# Patient Record
Sex: FEMALE | Race: WHITE | NOT HISPANIC OR LATINO | URBAN - METROPOLITAN AREA
[De-identification: names, ages, dates, MRNs, and addresses within clinical notes are randomized per-mention and may not be internally consistent; named-entity substitution may affect disease eponyms.]

---

## 2022-08-15 ENCOUNTER — OFFICE VISIT (OUTPATIENT)
Dept: OBGYN CLINIC | Facility: CLINIC | Age: 52
End: 2022-08-15
Payer: OTHER GOVERNMENT

## 2022-08-15 VITALS
BODY MASS INDEX: 24.14 KG/M2 | HEIGHT: 67 IN | WEIGHT: 153.8 LBS | SYSTOLIC BLOOD PRESSURE: 120 MMHG | DIASTOLIC BLOOD PRESSURE: 64 MMHG

## 2022-08-15 DIAGNOSIS — Z12.31 ENCOUNTER FOR SCREENING MAMMOGRAM FOR MALIGNANT NEOPLASM OF BREAST: ICD-10-CM

## 2022-08-15 DIAGNOSIS — Z12.4 SCREENING FOR CERVICAL CANCER: ICD-10-CM

## 2022-08-15 DIAGNOSIS — Z01.419 ENCOUNTER FOR GYNECOLOGICAL EXAMINATION WITHOUT ABNORMAL FINDING: Primary | ICD-10-CM

## 2022-08-15 PROCEDURE — 99386 PREV VISIT NEW AGE 40-64: CPT | Performed by: OBSTETRICS & GYNECOLOGY

## 2022-08-15 NOTE — PROGRESS NOTES
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  - Contraceptive counseling discussed  Current contraception: condoms or Mirena IUD since 04/2018:   - Breast Cancer Screening: Last Mammogram Not on file,  Order given   - Colorectal cancer screening was not ordered  Pt has referral   - The following were reviewed in today's visit: breast self exam, menopause, osteoporosis, adequate intake of calcium and vitamin D, exercise, healthy diet and colonoscopy discussed and ordered    Additional problems addressed during this visit:  1  Encounter for gynecological examination without abnormal finding    2  Encounter for screening mammogram for malignant neoplasm of breast  -     Mammo screening bilateral w 3d & cad; Future; Expected date: 08/15/2023    3  Screening for cervical cancer  -     Thinprep Tis Pap and HPV mRNA E6/E7 Reflex HPV 16,18/45        CC:  Annual Gynecologic Examination    HPI: Yannick Rosa is a 45 y o  No obstetric history on file  who presents for annual gynecologic examination  46 yo here for wellness exam     Mirena iud since 4/2018   No bleeding  + night awakening   No dryness   Mirena placed  4/2018  Uses cytotec for placement   The following portions of the patient's history were reviewed and updated as appropriate: She  has a past medical history of Abnormal Pap smear of cervix and Varicella  She  has no past surgical history on file    Her family history includes Breast cancer in her maternal grandmother; Cancer in her maternal grandfather; Diabetes in her maternal grandmother and mother; No Known Problems in her brother and paternal grandmother; Stroke in her maternal grandmother  She  reports that she quit smoking about 18 years ago  She has never used smokeless tobacco  She reports current alcohol use  She reports that she does not use drugs  Current Outpatient Medications   Medication Sig Dispense Refill    Levonorgestrel (MIRENA) 20 MCG/DAY IUD 1 each by Intrauterine route once       No current facility-administered medications for this visit  She has No Known Allergies       Review of Systems:  All systems normal except as noted in HPI          Objective:  /64 (BP Location: Left arm, Patient Position: Sitting, Cuff Size: Large)   Ht 5' 7 25" (1 708 m)   Wt 69 8 kg (153 lb 12 8 oz)   Breastfeeding No   BMI 23 91 kg/m²    Physical Exam  Vitals and nursing note reviewed  Constitutional:       Appearance: Normal appearance  HENT:      Head: Normocephalic  Cardiovascular:      Rate and Rhythm: Normal rate and regular rhythm  Pulses: Normal pulses  Heart sounds: Normal heart sounds  Pulmonary:      Effort: Pulmonary effort is normal       Breath sounds: Normal breath sounds  Chest:      Chest wall: No mass, lacerations, swelling, tenderness or edema  Breasts: Liborio Score is 4  Breasts are symmetrical       Right: Normal  No swelling, bleeding, inverted nipple, mass, nipple discharge, skin change, tenderness, axillary adenopathy or supraclavicular adenopathy  Left: No swelling, bleeding, inverted nipple, mass, nipple discharge, skin change, tenderness, axillary adenopathy or supraclavicular adenopathy  Abdominal:      General: Abdomen is flat  Bowel sounds are normal       Palpations: Abdomen is soft  Genitourinary:     General: Normal vulva  Exam position: Lithotomy position  Pubic Area: No rash  Liborio stage (genital): 4       Labia:         Right: No rash, tenderness or lesion           Left: No rash, tenderness or lesion  Urethra: No urethral pain, urethral swelling or urethral lesion  Vagina: Normal       Cervix: No cervical motion tenderness or discharge  Uterus: Normal        Adnexa: Right adnexa normal and left adnexa normal       Rectum: Normal    Musculoskeletal:         General: Normal range of motion  Cervical back: Neck supple  Lymphadenopathy:      Upper Body:      Right upper body: No supraclavicular, axillary or pectoral adenopathy  Left upper body: No supraclavicular, axillary or pectoral adenopathy  Lower Body: No right inguinal adenopathy  No left inguinal adenopathy  Skin:     General: Skin is warm and dry  Neurological:      General: No focal deficit present  Mental Status: She is alert and oriented to person, place, and time     Psychiatric:         Mood and Affect: Mood normal          Behavior: Behavior normal

## 2022-08-15 NOTE — PATIENT INSTRUCTIONS
Calcium 1200-1500mg + 600-1000 IU Vit D daily  Exercise 150 minutes per week minimum including weight bearing exercises  Pap with high risk HPV Q 3-5 years  Annual mammogram and monthly breast self exam recommended  Colonoscopy-  encouraged     has order     Kegels 20 times twice daily  Silicone based lubricant with sex  Vaginal moisturizers twice weekly as needed

## 2022-08-17 LAB
CLINICAL INFO: NORMAL
CYTO CVX: NORMAL
CYTOLOGY CMNT CVX/VAG CYTO-IMP: NORMAL
DATE PREVIOUS BX: NORMAL
HPV E6+E7 MRNA CVX QL NAA+PROBE: NOT DETECTED
LMP START DATE: NORMAL
SL AMB PREV. PAP:: NORMAL
SPECIMEN SOURCE CVX/VAG CYTO: NORMAL

## 2022-10-24 ENCOUNTER — HOSPITAL ENCOUNTER (OUTPATIENT)
Dept: MAMMOGRAPHY | Facility: IMAGING CENTER | Age: 52
Discharge: HOME | End: 2022-10-24
Payer: COMMERCIAL

## 2022-10-24 VITALS — WEIGHT: 152 LBS | HEIGHT: 67 IN | BODY MASS INDEX: 23.86 KG/M2

## 2022-10-24 DIAGNOSIS — Z12.31 ENCOUNTER FOR SCREENING MAMMOGRAM FOR MALIGNANT NEOPLASM OF BREAST: ICD-10-CM

## 2022-10-24 PROCEDURE — 77063 BREAST TOMOSYNTHESIS BI: CPT

## 2022-10-24 PROCEDURE — 77067 SCR MAMMO BI INCL CAD: CPT

## 2023-03-24 ENCOUNTER — TELEPHONE (OUTPATIENT)
Dept: GASTROENTEROLOGY | Facility: CLINIC | Age: 53
End: 2023-03-24

## 2023-03-24 ENCOUNTER — PREP FOR PROCEDURE (OUTPATIENT)
Dept: GASTROENTEROLOGY | Facility: CLINIC | Age: 53
End: 2023-03-24

## 2023-03-24 DIAGNOSIS — Z12.11 SCREENING FOR COLON CANCER: Primary | ICD-10-CM

## 2023-03-24 NOTE — TELEPHONE ENCOUNTER
Scheduled date of colonoscopy (as of today):5/23/23  Physician performing colonoscopy:JOYCE  Location of colonoscopy:[facility]  Clearances: NA

## 2023-03-24 NOTE — TELEPHONE ENCOUNTER
03/24/23  Screened by: Robbin Falk    Referring Provider Philomena Holley    Pre- Screening: There is no height or weight on file to calculate BMI  Has patient been referred for a routine screening Colonoscopy? yes  Is the patient between 39-70 years old? yes      Previous Colonoscopy no   If yes:    Date:     Facility:     Reason:       SCHEDULING STAFF: If the patient is between 45yrs-49yrs, please advise patient to confirm benefits/coverage with their insurance company for a routine screening colonoscopy, some insurance carriers will only cover at [address] or older  If the patient is over 66years old, please schedule an office visit  Does the patient want to see a Gastroenterologist prior to their procedure OR are they having any GI symptoms? no    Has the patient been hospitalized or had abdominal surgery in the past 6 months? no    Does the patient use supplemental oxygen? no    Does the patient take Coumadin, Lovenox, Plavix, Elliquis, Xarelto, or other blood thinning medication? no    Has the patient had a stroke, cardiac event, or stent placed in the past year? no     PT PASSED OA    SCHEDULING STAFF: If patient answers NO to above questions, then schedule procedure  If patient answers YES to above questions, then schedule office appointment  If patient is between 45yrs - 49yrs, please advise patient that we will have to confirm benefits & coverage with their insurance company for a routine screening colonoscopy

## 2023-05-09 ENCOUNTER — TELEPHONE (OUTPATIENT)
Dept: GASTROENTEROLOGY | Facility: CLINIC | Age: 53
End: 2023-05-09

## 2023-05-09 DIAGNOSIS — Z12.11 SCREENING FOR COLON CANCER: Primary | ICD-10-CM

## 2023-05-09 NOTE — TELEPHONE ENCOUNTER
Gave patient positive BV results. She started the metronidazole and symptoms are improving. She states that she typically gets yeast infections after taking an antibiotic and was wondering if she could get something for that. Will prescribe diflucan for patient. She had no further questions or concerns.    Procedure confirmed  Colonoscopy     Via: Spoke with patient  Instructions given: Email     Prep Given: Golytely    Call the office if there are any questions

## 2023-05-22 ENCOUNTER — ANESTHESIA EVENT (OUTPATIENT)
Dept: ANESTHESIOLOGY | Facility: AMBULATORY SURGERY CENTER | Age: 53
End: 2023-05-22

## 2023-05-22 ENCOUNTER — ANESTHESIA (OUTPATIENT)
Dept: ANESTHESIOLOGY | Facility: AMBULATORY SURGERY CENTER | Age: 53
End: 2023-05-22

## 2023-05-23 ENCOUNTER — ANESTHESIA (OUTPATIENT)
Dept: GASTROENTEROLOGY | Facility: AMBULATORY SURGERY CENTER | Age: 53
End: 2023-05-23

## 2023-05-23 ENCOUNTER — HOSPITAL ENCOUNTER (OUTPATIENT)
Dept: GASTROENTEROLOGY | Facility: AMBULATORY SURGERY CENTER | Age: 53
Discharge: HOME | End: 2023-05-23

## 2023-05-23 ENCOUNTER — ANESTHESIA EVENT (OUTPATIENT)
Dept: GASTROENTEROLOGY | Facility: AMBULATORY SURGERY CENTER | Age: 53
End: 2023-05-23

## 2023-05-23 VITALS
DIASTOLIC BLOOD PRESSURE: 93 MMHG | HEIGHT: 67 IN | RESPIRATION RATE: 13 BRPM | BODY MASS INDEX: 24.33 KG/M2 | OXYGEN SATURATION: 100 % | SYSTOLIC BLOOD PRESSURE: 157 MMHG | HEART RATE: 58 BPM | WEIGHT: 155 LBS | TEMPERATURE: 98.6 F

## 2023-05-23 DIAGNOSIS — Z12.11 SCREENING FOR COLON CANCER: ICD-10-CM

## 2023-05-23 LAB
EXT PREGNANCY TEST URINE: NEGATIVE
EXT. CONTROL: NORMAL

## 2023-05-23 RX ORDER — PROPOFOL 10 MG/ML
INJECTION, EMULSION INTRAVENOUS AS NEEDED
Status: DISCONTINUED | OUTPATIENT
Start: 2023-05-23 | End: 2023-05-23

## 2023-05-23 RX ORDER — LIDOCAINE HYDROCHLORIDE 10 MG/ML
INJECTION, SOLUTION EPIDURAL; INFILTRATION; INTRACAUDAL; PERINEURAL AS NEEDED
Status: DISCONTINUED | OUTPATIENT
Start: 2023-05-23 | End: 2023-05-23

## 2023-05-23 RX ORDER — SODIUM CHLORIDE, SODIUM LACTATE, POTASSIUM CHLORIDE, CALCIUM CHLORIDE 600; 310; 30; 20 MG/100ML; MG/100ML; MG/100ML; MG/100ML
50 INJECTION, SOLUTION INTRAVENOUS CONTINUOUS
Status: DISCONTINUED | OUTPATIENT
Start: 2023-05-23 | End: 2023-05-27 | Stop reason: HOSPADM

## 2023-05-23 RX ADMIN — PROPOFOL 40 MG: 10 INJECTION, EMULSION INTRAVENOUS at 08:36

## 2023-05-23 RX ADMIN — PROPOFOL 20 MG: 10 INJECTION, EMULSION INTRAVENOUS at 07:31

## 2023-05-23 RX ADMIN — PROPOFOL 20 MG: 10 INJECTION, EMULSION INTRAVENOUS at 08:24

## 2023-05-23 RX ADMIN — PROPOFOL 30 MG: 10 INJECTION, EMULSION INTRAVENOUS at 07:51

## 2023-05-23 RX ADMIN — SODIUM CHLORIDE, SODIUM LACTATE, POTASSIUM CHLORIDE, CALCIUM CHLORIDE 50 ML/HR: 600; 310; 30; 20 INJECTION, SOLUTION INTRAVENOUS at 07:05

## 2023-05-23 RX ADMIN — PROPOFOL 20 MG: 10 INJECTION, EMULSION INTRAVENOUS at 07:33

## 2023-05-23 RX ADMIN — PROPOFOL 30 MG: 10 INJECTION, EMULSION INTRAVENOUS at 08:04

## 2023-05-23 RX ADMIN — PROPOFOL 20 MG: 10 INJECTION, EMULSION INTRAVENOUS at 08:33

## 2023-05-23 RX ADMIN — PROPOFOL 50 MG: 10 INJECTION, EMULSION INTRAVENOUS at 08:29

## 2023-05-23 RX ADMIN — PROPOFOL 110 MG: 10 INJECTION, EMULSION INTRAVENOUS at 07:26

## 2023-05-23 RX ADMIN — PROPOFOL 20 MG: 10 INJECTION, EMULSION INTRAVENOUS at 07:54

## 2023-05-23 RX ADMIN — PROPOFOL 20 MG: 10 INJECTION, EMULSION INTRAVENOUS at 07:56

## 2023-05-23 RX ADMIN — PROPOFOL 30 MG: 10 INJECTION, EMULSION INTRAVENOUS at 08:21

## 2023-05-23 RX ADMIN — LIDOCAINE HYDROCHLORIDE 50 MG: 10 INJECTION, SOLUTION EPIDURAL; INFILTRATION; INTRACAUDAL; PERINEURAL at 07:26

## 2023-05-23 RX ADMIN — PROPOFOL 20 MG: 10 INJECTION, EMULSION INTRAVENOUS at 07:39

## 2023-05-23 RX ADMIN — PROPOFOL 20 MG: 10 INJECTION, EMULSION INTRAVENOUS at 08:26

## 2023-05-23 RX ADMIN — PROPOFOL 40 MG: 10 INJECTION, EMULSION INTRAVENOUS at 07:45

## 2023-05-23 RX ADMIN — PROPOFOL 40 MG: 10 INJECTION, EMULSION INTRAVENOUS at 07:42

## 2023-05-23 RX ADMIN — PROPOFOL 20 MG: 10 INJECTION, EMULSION INTRAVENOUS at 08:31

## 2023-05-23 RX ADMIN — PROPOFOL 30 MG: 10 INJECTION, EMULSION INTRAVENOUS at 08:16

## 2023-05-23 RX ADMIN — PROPOFOL 30 MG: 10 INJECTION, EMULSION INTRAVENOUS at 07:36

## 2023-05-23 RX ADMIN — PROPOFOL 20 MG: 10 INJECTION, EMULSION INTRAVENOUS at 07:47

## 2023-05-23 RX ADMIN — PROPOFOL 20 MG: 10 INJECTION, EMULSION INTRAVENOUS at 08:12

## 2023-05-23 RX ADMIN — PROPOFOL 30 MG: 10 INJECTION, EMULSION INTRAVENOUS at 08:09

## 2023-05-23 RX ADMIN — PROPOFOL 30 MG: 10 INJECTION, EMULSION INTRAVENOUS at 07:28

## 2023-05-23 RX ADMIN — PROPOFOL 40 MG: 10 INJECTION, EMULSION INTRAVENOUS at 07:29

## 2023-05-23 RX ADMIN — PROPOFOL 20 MG: 10 INJECTION, EMULSION INTRAVENOUS at 08:18

## 2023-05-23 RX ADMIN — PROPOFOL 30 MG: 10 INJECTION, EMULSION INTRAVENOUS at 08:00

## 2023-05-23 NOTE — H&P
"History and Physical -  [facility]  Dexter De La Torre 47 y o  female MRN: [id number]    HPI: Dexter De La Torre is a 47y o  year old female who presents for colon cancer screening    REVIEW OF SYSTEMS: Per the HPI, and otherwise unremarkable      Historical Information   Past Medical History:   Diagnosis Date   • Abnormal Pap smear of cervix    • Sinus congestion     chronic   • Varicella      Past Surgical History:   Procedure Laterality Date   • BREAST EXCISIONAL BIOPSY Right 2008    benign   • US BREAST NEEDLE LOC RIGHT Right 10/08/2008     Social History   Social History     Substance and Sexual Activity   Alcohol Use Yes     Social History     Substance and Sexual Activity   Drug Use Never     Social History     Tobacco Use   Smoking Status Former   • Types: Cigarettes   • Quit date:    • Years since quittin 4   Smokeless Tobacco Never     Family History   Problem Relation Age of Onset   • Diabetes Mother    • No Known Problems Father    • Breast cancer Maternal Grandmother         50-60's   • Stroke Maternal Grandmother    • Diabetes Maternal Grandmother    • Cancer Maternal Grandfather    • No Known Problems Paternal Grandmother    • No Known Problems Brother    • No Known Problems Half-Sister    • Breast cancer Maternal Aunt         68-70   • Endometrial cancer Maternal Aunt         maybe 40's   • No Known Problems Maternal Aunt    • Colon cancer Neg Hx    • Ovarian cancer Neg Hx        Meds/Allergies       Current Outpatient Medications:   •  Levonorgestrel (MIRENA) 20 MCG/DAY IUD  •  polyethylene glycol (GOLYTELY) 4000 mL solution    Current Facility-Administered Medications:   •  lactated ringers infusion, 50 mL/hr, Intravenous, Continuous, 50 mL/hr at 23 0705    No Known Allergies    Objective     /84   Pulse 62   Temp 98 6 °F (37 °C) (Tympanic)   Resp 16   Ht 5' 7\" (1 702 m)   Wt 70 3 kg (155 lb)   SpO2 97%   BMI 24 28 kg/m²     PHYSICAL EXAM    Gen: NAD " AAOx3  Head: Normocephalic, Atraumatic  CV: S1S2 RRR no m/r/g  CHEST: Clear b/l no c/r/w  ABD: soft, +BS NT/ND  EXT: no edema    ASSESSMENT/PLAN:  This is a 47y o  year old female here for colonoscopy, and she is stable and optimized for her procedure

## 2023-05-23 NOTE — ANESTHESIA POSTPROCEDURE EVALUATION
Post-Op Assessment Note    CV Status:  Stable  Pain Score: 0    Pain management: adequate     Mental Status:  Alert and awake   Hydration Status:  Euvolemic   PONV Controlled:  Controlled   Airway Patency:  Patent      Post Op Vitals Reviewed: Yes      Staff: Anesthesiologist, CRNA         No notable events documented      BP   134/87   Temp   n/a   Pulse  74   Resp   14   SpO2   98

## 2023-05-23 NOTE — ANESTHESIA PREPROCEDURE EVALUATION
Procedure:  COLONOSCOPY    Relevant Problems   No relevant active problems        Physical Exam    Airway    Mallampati score: II  TM Distance: >3 FB  Neck ROM: full     Dental   No notable dental hx     Cardiovascular      Pulmonary      Other Findings        Anesthesia Plan  ASA Score- 1     Anesthesia Type- IV sedation with anesthesia with ASA Monitors  Additional Monitors:   Airway Plan:           Plan Factors-Exercise tolerance (METS): >4 METS  Chart reviewed  Patient is not a current smoker  Induction- intravenous  Postoperative Plan-     Informed Consent- Anesthetic plan and risks discussed with patient  I personally reviewed this patient with the CRNA  Discussed and agreed on the Anesthesia Plan with the CRNA  Torie Collins

## 2023-06-02 ENCOUNTER — TELEPHONE (OUTPATIENT)
Dept: GASTROENTEROLOGY | Facility: CLINIC | Age: 53
End: 2023-06-02

## 2023-06-02 DIAGNOSIS — C18.9 ADENOCARCINOMA OF COLON (HCC): Primary | ICD-10-CM

## 2023-06-02 DIAGNOSIS — C18.9 COLON ADENOCARCINOMA (HCC): Primary | ICD-10-CM

## 2023-06-02 PROCEDURE — 88344 IMHCHEM/IMCYTCHM EA MLT ANTB: CPT | Performed by: PATHOLOGY

## 2023-06-02 PROCEDURE — 88305 TISSUE EXAM BY PATHOLOGIST: CPT | Performed by: PATHOLOGY

## 2023-06-02 NOTE — TELEPHONE ENCOUNTER
----- Message from Yanira Be MD sent at 6/2/2023  3:44 PM EDT -----  I'll take care of it! Just place the referral to me and I'll do the rest  Thanks Jerome Felix, lets get CT CAP ASAP and before she comes in please - we'll see how that looks and talk with her and send to med onc if needed -    ANANT    ----- Message -----  From: Essence Rocha DO  Sent: 6/2/2023   2:00 PM EDT  To: MD Ambar Bullock -   I saw this patient for a screening colonoscopy last week  She had a very large pedunculated polyp in the sigmoid at 28 cm  I had to remove much of the lesion before I could access the stalk  I tattooed the polypectomy site  Histology returned as invasive adenocarcinoma  Should I sent this pt to you or medical oncology? If you see her, would you like any lab/imaging before hand?   Thanks - Genella Kussmaul
Discussed with surgical oncologist   Consult entered    Plan CT and oncology consult    Discussed with patient
English

## 2023-06-02 NOTE — RESULT ENCOUNTER NOTE
Discussed with patient, 1 year colonoscopy recall  Message sent to oncology about imaging and follow-up

## 2023-06-05 ENCOUNTER — TELEPHONE (OUTPATIENT)
Dept: HEMATOLOGY ONCOLOGY | Facility: CLINIC | Age: 53
End: 2023-06-05

## 2023-06-05 ENCOUNTER — PATIENT OUTREACH (OUTPATIENT)
Dept: HEMATOLOGY ONCOLOGY | Facility: CLINIC | Age: 53
End: 2023-06-05

## 2023-06-05 DIAGNOSIS — C18.9 COLON ADENOCARCINOMA (HCC): Primary | ICD-10-CM

## 2023-06-05 NOTE — PROGRESS NOTES
NN phone outreach to the pt, called and explained my role, I rvwd w her the appt she had made today w surg onc and how I wanted to get her sched sooner w Dr Andria Fleischer following her CT scan  I explained results would be in for her CT scan by Tues the following wk  She was very appreciative that we resched her sooner to be seen  I explained that she would be meeting Dr Andria Fleischer to discuss if sx is rec or not for the part of the colon they found the polyp w the cancerous cells present  I explained the reason for the CT scan was to make sure there were no masses noted anywhere else such the lungs or liver  She understood  I told her I would be f/u after she meets w Dr Andria Fleischer to see what the plan is for her in case I can assist in anyway, I told her she could save my phone number and if she has any questions that turn up she can always reach out to me  She was very appreciative of my call and the information explained

## 2023-06-05 NOTE — TELEPHONE ENCOUNTER
I called Isabela Mendez in response to a referral that was received for patient to establish care with Surgical Oncology  Outreach was made to schedule a consultation     I left a voicemail explaining the reason for my call and advised patient to call [facility] [place] at [phone]  Another attempt will be made to contact patient  Patient to be scheduled after CT on 6/11

## 2023-06-06 ENCOUNTER — APPOINTMENT (OUTPATIENT)
Dept: LAB | Facility: HOSPITAL | Age: 53
End: 2023-06-06
Payer: COMMERCIAL

## 2023-06-06 DIAGNOSIS — C18.9: ICD-10-CM

## 2023-06-06 LAB
BUN SERPL-MCNC: 18 MG/DL (ref 5–25)
CREAT SERPL-MCNC: 1.03 MG/DL (ref 0.6–1.3)
GFR SERPL CREATININE-BSD FRML MDRD: 62 ML/MIN/1.73SQ M

## 2023-06-06 PROCEDURE — 84520 ASSAY OF UREA NITROGEN: CPT

## 2023-06-06 PROCEDURE — 36415 COLL VENOUS BLD VENIPUNCTURE: CPT

## 2023-06-06 PROCEDURE — 82565 ASSAY OF CREATININE: CPT

## 2023-06-08 PROBLEM — C18.9 COLON ADENOCARCINOMA (HCC): Status: ACTIVE | Noted: 2023-06-08

## 2023-06-11 ENCOUNTER — HOSPITAL ENCOUNTER (OUTPATIENT)
Dept: CT IMAGING | Facility: HOSPITAL | Age: 53
Discharge: HOME | End: 2023-06-11
Attending: STUDENT IN AN ORGANIZED HEALTH CARE EDUCATION/TRAINING PROGRAM
Payer: COMMERCIAL

## 2023-06-11 DIAGNOSIS — C18.9 COLON ADENOCARCINOMA (HCC): ICD-10-CM

## 2023-06-11 PROCEDURE — 71260 CT THORAX DX C+: CPT

## 2023-06-11 PROCEDURE — 74177 CT ABD & PELVIS W/CONTRAST: CPT

## 2023-06-11 PROCEDURE — G1004 CDSM NDSC: HCPCS

## 2023-06-11 RX ADMIN — IOHEXOL 80 ML: 350 INJECTION, SOLUTION INTRAVENOUS at 10:52

## 2023-06-13 ENCOUNTER — CONSULT (OUTPATIENT)
Dept: SURGICAL ONCOLOGY | Facility: CLINIC | Age: 53
End: 2023-06-13
Payer: COMMERCIAL

## 2023-06-13 VITALS
OXYGEN SATURATION: 98 % | WEIGHT: 160 LBS | BODY MASS INDEX: 25.11 KG/M2 | RESPIRATION RATE: 16 BRPM | DIASTOLIC BLOOD PRESSURE: 70 MMHG | TEMPERATURE: 98.1 F | HEIGHT: 67 IN | SYSTOLIC BLOOD PRESSURE: 120 MMHG | HEART RATE: 65 BPM

## 2023-06-13 DIAGNOSIS — C18.9 COLON ADENOCARCINOMA (HCC): Primary | ICD-10-CM

## 2023-06-13 DIAGNOSIS — C18.9 ADENOCARCINOMA OF COLON (HCC): ICD-10-CM

## 2023-06-13 PROCEDURE — 99205 OFFICE O/P NEW HI 60 MIN: CPT | Performed by: STUDENT IN AN ORGANIZED HEALTH CARE EDUCATION/TRAINING PROGRAM

## 2023-06-13 RX ORDER — METRONIDAZOLE 500 MG/100ML
500 INJECTION, SOLUTION INTRAVENOUS ONCE
Status: CANCELLED | OUTPATIENT
Start: 2023-06-13

## 2023-06-13 RX ORDER — CEFAZOLIN SODIUM 2 G/50ML
2000 SOLUTION INTRAVENOUS ONCE
Status: CANCELLED | OUTPATIENT
Start: 2023-06-13

## 2023-06-13 NOTE — PROGRESS NOTES
Surgical Oncology Consultation    [address] [facility] CANCER CARE SURGICAL ONCOLOGY [facility]  [address] [facility] [address] [phone]    Patient:  Irl File  1970  [id number]    Primary Care provider:  Lupillo Osborne DO  [address]  [place] [address]    Referring provider:  Seamus Posada DO  [address] [facility]  [address]  [facility],  [address] [place] [address] Ext    Diagnoses and all orders for this visit:    Colon adenocarcinoma [facility])  -     Case request operating room: SIGMOID RESECTION COLON LAPAROSCOPIC W ROBOTICS; Standing  -     Case request operating room: SIGMOID RESECTION COLON LAPAROSCOPIC W ROBOTICS    Adenocarcinoma of colon [facility])  -     Ambulatory Referral to Surgical Oncology  -     CBC and differential; Future  -     Comprehensive metabolic panel; Future  -     APTT; Future  -     Protime-INR; Future  -     HEMOGLOBIN A1C W/ EAG ESTIMATION; Future  -     Type and screen; Future  -     EKG 12 lead; Future  -     CBC and differential  -     Comprehensive metabolic panel  -     APTT  -     Protime-INR    Other orders  -     Incentive spirometry; Standing  -     Insert and maintain IV line; Standing  -     Void On-Call to O R ; Standing  -     Place sequential compression device; Standing  -     ceFAZolin (ANCEF) IVPB (premix in dextrose) 2,000 mg 50 mL  -     metroNIDAZOLE (FLAGYL) IVPB (premix) 500 mg 100 mL        Chief Complaint   Patient presents with   • Consult       No follow-ups on file  Oncology History   Colon adenocarcinoma ([facility] [address] )   5/23/2023 Biopsy    A  Transverse colon polyp cold snare, polypectomy:  -   Fragments of sessile serrated adenoma (deeper levels examined)  -   Separate scant fragments of tubular adenoma, likely tissue contaminant  B  Sigmoid colon polyp hot snare, polypectomy:  -   Invasive adenocarcinoma, moderately differentiated, arising in fragments of traditional serrated adenoma    -   MMR (MLH1, MSH2, MSH6 and PMS2) studies by immunohistochemistry on B2 are pending   -   See comment and synoptic report  6/8/2023 Initial Diagnosis    Colon adenocarcinoma (HCC)         History of Present Illness  : This is a 54-year-old female who presents today for consultation regarding an adenocarcinoma  The patient underwent her first screening colonoscopy, revealing 2 polyps, one at the transverse colon demonstrating sessile serrated adenoma histology, and the other at the sigmoid colon, demonstrating invasive adenocarcinoma, moderately differentiated  The sigmoid polyp was marked with a tattoo just distal to the polyp  The patient is asymptomatic, with no abdominal pain, no nausea or vomiting, no change in stool habits, no blood in her stool  She has no history of GI complaints, and no history abdominal surgery  She is very healthy with no cardiopulmonary history, and is very active and exercises regularly  Review of Systems  Complete ROS Surg Onc:   Constitutional: The patient denies new or recent history of general fatigue, no recent weight loss, no change in appetite  Eyes: No complaints of visual problems, no scleral icterus  ENT: No complaints of ear pain, no hoarseness, no difficulty swallowing,  no tinnitus and no new masses in head, oral cavity, or neck  Cardiovascular: No complaints of chest pain, no palpitations, no ankle edema  Respiratory: No complaints of shortness of breath, no cough  Gastrointestinal: No complaints of jaundice, no bloody stools, no pale stools  Genitourinary: No complaints of dysuria, no hematuria, no nocturia, no frequent urination, no urethral discharge  Musculoskeletal: No complaints of weakness, paralysis, joint stiffness or arthralgias  Integumentary: No complaints of rash, no new lesions  Neurological: No complaints of convulsions, no seizures, no dizziness  Hematologic/Lymphatic: No complaints of easy bruising  Endocrine:  No hot or cold intolerance    No polydipsia, polyphagia, or polyuria  Allergy/immunology:  No environmental allergies  No food allergies  Not immunocompromised  Patient Active Problem List   Diagnosis   • Colon adenocarcinoma [facility])     Past Medical History:   Diagnosis Date   • Abnormal Pap smear of cervix    • Sinus congestion     chronic   • Varicella      Past Surgical History:   Procedure Laterality Date   • BREAST EXCISIONAL BIOPSY Right 2008    benign   • US BREAST NEEDLE LOC RIGHT Right 10/08/2008     Family History   Problem Relation Age of Onset   • Diabetes Mother    • No Known Problems Father    • Breast cancer Maternal Grandmother         50-60's   • Stroke Maternal Grandmother    • Diabetes Maternal Grandmother    • Cancer Maternal Grandfather    • No Known Problems Paternal Grandmother    • No Known Problems Brother    • No Known Problems Half-Sister    • Breast cancer Maternal Aunt         68-70   • Endometrial cancer Maternal Aunt         maybe 40's   • No Known Problems Maternal Aunt    • Colon cancer Neg Hx    • Ovarian cancer Neg Hx      Social History     Socioeconomic History   • Marital status: /Civil Union     Spouse name: Not on file   • Number of children: Not on file   • Years of education: Not on file   • Highest education level: Not on file   Occupational History   • Not on file   Tobacco Use   • Smoking status: Former     Types: Cigarettes     Quit date:      Years since quittin 4   • Smokeless tobacco: Never   Vaping Use   • Vaping Use: Never used   Substance and Sexual Activity   • Alcohol use: Yes   • Drug use: Never   • Sexual activity: Yes     Partners: Male     Birth control/protection: I U D       Comment: no new partner in past year   Other Topics Concern   • Not on file   Social History Narrative   • Not on file     Social Determinants of Health     Financial Resource Strain: Not on file   Food Insecurity: Not on file   Transportation Needs: Not on file   Physical Activity: Not on file   Stress: Not on file   Social Connections: Not on file   Intimate Partner Violence: Not on file   Housing Stability: Not on file       Current Outpatient Medications:   •  Levonorgestrel (MIRENA) 20 MCG/DAY IUD, 1 each by Intrauterine route once, Disp: , Rfl:   No Known Allergies    Vitals:    06/13/23 0954   BP: 120/70   Pulse: 65   Resp: 16   Temp: 98 1 °F (36 7 °C)   SpO2: 98%       Physical Exam   General: Appears well, appears stated age  Skin: Warm, anicteric  HEENT: Normocephalic, atraumatic; sclera aniceteric, mucous membranes moist; cervical nodes without adenopathy  Cardiopulmonary: RRR, Easy WOB, no BLE edema  Abd: Flat and soft, nontender, no masses appreciated, no hepatosplenomegaly  MSK: Symmetric, no cyanosis, no overt weakness  Lymphatic: No cervical, axillary or inguinal lymphadenopathy  Neuro: Affect appropriate, no gross motor abnormalities      Pathology:  Final Diagnosis   A  Transverse colon polyp cold snare, polypectomy:  -   Fragments of sessile serrated adenoma (deeper levels examined)  -   Separate scant fragments of tubular adenoma, likely tissue contaminant        B  Sigmoid colon polyp hot snare, polypectomy:  -   Invasive adenocarcinoma, moderately differentiated, arising in fragments of traditional serrated adenoma  -   MMR (MLH1, MSH2, MSH6 and PMS2) studies by immunohistochemistry on B2 are pending   -   See comment and synoptic report             Labs: Reviewed in EPIC    Imaging  CT chest abdomen pelvis w contrast    Result Date: 6/12/2023  Narrative: CT CHEST, ABDOMEN AND PELVIS WITH IV CONTRAST INDICATION:   C18 9: Malignant neoplasm of colon, unspecified  COMPARISON:  None  TECHNIQUE: CT examination of the chest, abdomen and pelvis was performed  Multiplanar 2D reformatted images were created from the source data   This examination, like all CT scans performed in the [facility], was performed utilizing techniques to minimize radiation dose exposure, including the use of iterative reconstruction and automated exposure control  Radiation dose length product (DLP) for this visit:  717 16 mGy-cm IV Contrast:  80 mL of iohexol (OMNIPAQUE) Enteric Contrast: Enteric contrast was not administered  FINDINGS: CHEST LUNGS: No worrisome pulmonary nodule  There is no tracheal or endobronchial lesion  PLEURA:  Unremarkable  HEART/GREAT VESSELS: Heart is unremarkable for patient's age  No thoracic aortic aneurysm  MEDIASTINUM AND BRYAN:  Unremarkable  CHEST WALL AND LOWER NECK:  Unremarkable  ABDOMEN LIVER/BILIARY TREE:  Unremarkable  GALLBLADDER:  No calcified gallstones  No pericholecystic inflammatory change  SPLEEN:  Unremarkable  PANCREAS:  Unremarkable  ADRENAL GLANDS:  Unremarkable  KIDNEYS/URETERS: One or more sharply circumscribed subcentimeter renal hypodensities are noted  These lesions are too small to accurately characterize, but are statistically most likely to represent benign cortical renal cyst(s)  According to the guidelines published in the [facility] Paper of the ACR Incidental Findings Committee (Radiology 2010), no further workup of these lesions is recommended  No hydronephrosis  STOMACH AND BOWEL:  Unremarkable  APPENDIX:  No findings to suggest appendicitis  ABDOMINOPELVIC CAVITY:  No ascites  No pneumoperitoneum  No lymphadenopathy  VESSELS:  Unremarkable for patient's age  PELVIS REPRODUCTIVE ORGANS: There is an intrauterine device in the uterus  URINARY BLADDER:  Unremarkable  ABDOMINAL WALL/INGUINAL REGIONS:  Unremarkable  OSSEOUS STRUCTURES:  No acute fracture or destructive osseous lesion  Impression: No evidence for metastatic disease in the chest, abdomen, or pelvis  Workstation performed: [id number]     Colonoscopy    Result Date: 5/23/2023  Narrative: Table formatting from the original result was not included   [address] [place] [address] [address] [phone] DATE OF SERVICE: 5/23/23 PHYSICIAN(S): Attending: Belle Hernandez DO Fellow: No Staff Documented INDICATION: Screening for colon cancer POST-OP DIAGNOSIS: See the impression below  HISTORY: Prior colonoscopy: No prior colonoscopy  BOWEL PREPARATION: Golytely/Colyte/Trilyte PREPROCEDURE: Informed consent was obtained for the procedure, including sedation  Risks including but not limited to bleeding, infection, perforation, adverse drug reaction and aspiration were explained in detail  Also explained about less than 100% sensitivity with the exam and other alternatives  The patient was placed in the left lateral decubitus position  Procedure: Colonoscopy DETAILS OF PROCEDURE: Patient was taken to the procedure room where a time out was performed to confirm correct patient and correct procedure  The patient underwent monitored anesthesia care, which was administered by an anesthesia professional  The patient's blood pressure, heart rate, level of consciousness, oxygen and respirations were monitored throughout the procedure  A digital rectal exam was performed  The scope was introduced through the anus and advanced to the cecum  Retroflexion was performed in the rectum  The quality of bowel preparation was evaluated using the [facility] Bowel Preparation Scale with scores of: right colon = 3, transverse colon = 3, left colon = 3  The total BBPS score was 9  Bowel prep was adequate  The patient experienced no blood loss  The procedure was not difficult  The patient tolerated the procedure well  There were no apparent complications   ANESTHESIA INFORMATION: ASA: I Anesthesia Type: IV Sedation with Anesthesia MEDICATIONS: lactated ringers infusion 500 mL*  *From user-documented volume (Totals for administrations occurring from 0723 to 0843 on 05/23/23) FINDINGS: 25 mm pedunculated, adenomatous-appearing polyp in the sigmoid colon 28 cm from the anal verge; no bleeding was identified; completely removed in piecemeal fashion by hot snare and retrieved specimen; tattooed distal to the finding with kirk ink Few moderate scattered diverticula in the sigmoid colon 5 mm sessile polyp in the transverse colon; removed en bloc by cold snare and retrieved specimen EVENTS: Procedure Events Event Event Time ENDO CECUM REACHED 5/23/2023  7:32 AM ENDO SCOPE OUT TIME 5/23/2023  8:42 AM SPECIMENS: ID Type Source Tests Collected by Time Destination 1 : transverse colon polyp cold snare Tissue Large Intestine, Transverse Colon TISSUE EXAM Andi Oreilly DO 5/23/2023  8:43 AM  2 : sigmoid colon polyp hot snare Tissue Large Intestine, Sigmoid Colon TISSUE EXAM Andi Oreilly DO 5/23/2023  8:44 AM  EQUIPMENT: Colonoscope -[id number]     Impression: 1 polyp removed in the transverse colon with cold snare 1 large pedunculated polyp removed piecemeal from the sigmoid colon, tattoo applied at polypectomy site RECOMMENDATION:  Repeat colonoscopy in 3 years  Personal history of colon polyps  Plan flex sig in 6 months to assess polypectomy site, and full colonoscopy in 3 years Endoscopist will call with biopsy results within 2 weeks Avoid aspirin and other NSAIDs for 1 week due to polypectomy  Andi Oreilly DO       I independently reviewed and interpreted the above laboratory and imaging data, including cross sectional imaging, scope reports, path  Discussed with Dr Roxana Knapp  Discussion/Summary: This is a 43-year-old female with a likely early stage sigmoid colon cancer  We discussed this diagnosis and the recommendation for partial colectomy  We discussed the procedure and the expected hospital course  We discussed the risks to include infection, bleeding, anastomotic leak, need for additional procedures or temporary diversion  We discussed this as well the more global risks of MI, PE, DVT, stroke, death due to the overall body stress of surgery  The patient is at average risk to her her young age and robust physical status    We discussed that additional recommendations will be made following surgery depending on the final pathology  All questions were answered today of the patient and her

## 2023-06-13 NOTE — LETTER
June 13, 2023     Madeline El DO  [address] [facility]  [address]  [address] [facility] [address]    Patient: Brando Simon   YOB: 1970   Date of Visit: 6/13/2023       Dear Dr Nichols President:    Thank you for referring Brando Simon to me for evaluation  Below are my notes for this consultation  If you have questions, please do not hesitate to call me  I look forward to following your patient along with you  Sincerely,        Harry Hurst MD        CC: No Recipients    Harry Hurst MD  6/13/2023  2:12 PM  Sign when Signing Visit  Surgical Oncology Consultation    [address] [place] [address] [address] [phone]    Patient:  Brando Simon  1970  [id number]    Primary Care provider:  Hailey Holt DO  [address]  [facility] [facility] [address]    Referring provider:  Madeline El DO  [address] [facility]  [address]  [address]    Diagnoses and all orders for this visit:    Colon adenocarcinoma [facility])  -     Case request operating room: SIGMOID RESECTION COLON LAPAROSCOPIC W ROBOTICS; Standing  -     Case request operating room: SIGMOID RESECTION COLON LAPAROSCOPIC W ROBOTICS    Adenocarcinoma of colon [facility])  -     Ambulatory Referral to Surgical Oncology  -     CBC and differential; Future  -     Comprehensive metabolic panel; Future  -     APTT; Future  -     Protime-INR; Future  -     HEMOGLOBIN A1C W/ EAG ESTIMATION; Future  -     Type and screen; Future  -     EKG 12 lead;  Future  -     CBC and differential  -     Comprehensive metabolic panel  -     APTT  -     Protime-INR    Other orders  -     Incentive spirometry; Standing  -     Insert and maintain IV line; Standing  -     Void On-Call to O R ; Standing  -     Place sequential compression device; Standing  -     ceFAZolin (ANCEF) IVPB (premix in dextrose) 2,000 mg 50 mL  -     metroNIDAZOLE (FLAGYL) IVPB (premix) 500 mg 100 mL        Chief Complaint   Patient presents with   • Consult       No follow-ups on file  Oncology History   Colon adenocarcinoma ([facility] [address] )   5/23/2023 Biopsy    A  Transverse colon polyp cold snare, polypectomy:  -   Fragments of sessile serrated adenoma (deeper levels examined)  -   Separate scant fragments of tubular adenoma, likely tissue contaminant  B  Sigmoid colon polyp hot snare, polypectomy:  -   Invasive adenocarcinoma, moderately differentiated, arising in fragments of traditional serrated adenoma  -   MMR (MLH1, MSH2, MSH6 and PMS2) studies by immunohistochemistry on B2 are pending   -   See comment and synoptic report  6/8/2023 Initial Diagnosis    Colon adenocarcinoma (HCC)         History of Present Illness  : This is a 58-year-old female who presents today for consultation regarding an adenocarcinoma  The patient underwent her first screening colonoscopy, revealing 2 polyps, one at the transverse colon demonstrating sessile serrated adenoma histology, and the other at the sigmoid colon, demonstrating invasive adenocarcinoma, moderately differentiated  The sigmoid polyp was marked with a tattoo just distal to the polyp  The patient is asymptomatic, with no abdominal pain, no nausea or vomiting, no change in stool habits, no blood in her stool  She has no history of GI complaints, and no history abdominal surgery  She is very healthy with no cardiopulmonary history, and is very active and exercises regularly  Review of Systems  Complete ROS Surg Onc:   Constitutional: The patient denies new or recent history of general fatigue, no recent weight loss, no change in appetite  Eyes: No complaints of visual problems, no scleral icterus  ENT: No complaints of ear pain, no hoarseness, no difficulty swallowing,  no tinnitus and no new masses in head, oral cavity, or neck  Cardiovascular: No complaints of chest pain, no palpitations, no ankle edema     Respiratory: No complaints of shortness of breath, no cough  Gastrointestinal: No complaints of jaundice, no bloody stools, no pale stools  Genitourinary: No complaints of dysuria, no hematuria, no nocturia, no frequent urination, no urethral discharge  Musculoskeletal: No complaints of weakness, paralysis, joint stiffness or arthralgias  Integumentary: No complaints of rash, no new lesions  Neurological: No complaints of convulsions, no seizures, no dizziness  Hematologic/Lymphatic: No complaints of easy bruising  Endocrine:  No hot or cold intolerance  No polydipsia, polyphagia, or polyuria  Allergy/immunology:  No environmental allergies  No food allergies  Not immunocompromised        Patient Active Problem List   Diagnosis   • Colon adenocarcinoma [facility])     Past Medical History:   Diagnosis Date   • Abnormal Pap smear of cervix    • Sinus congestion     chronic   • Varicella      Past Surgical History:   Procedure Laterality Date   • BREAST EXCISIONAL BIOPSY Right 2008    benign   • US BREAST NEEDLE LOC RIGHT Right 10/08/2008     Family History   Problem Relation Age of Onset   • Diabetes Mother    • No Known Problems Father    • Breast cancer Maternal Grandmother         50-60's   • Stroke Maternal Grandmother    • Diabetes Maternal Grandmother    • Cancer Maternal Grandfather    • No Known Problems Paternal Grandmother    • No Known Problems Brother    • No Known Problems Half-Sister    • Breast cancer Maternal Aunt         68-70   • Endometrial cancer Maternal Aunt         maybe 40's   • No Known Problems Maternal Aunt    • Colon cancer Neg Hx    • Ovarian cancer Neg Hx      Social History     Socioeconomic History   • Marital status: /Civil Union     Spouse name: Not on file   • Number of children: Not on file   • Years of education: Not on file   • Highest education level: Not on file   Occupational History   • Not on file   Tobacco Use   • Smoking status: Former     Types: Cigarettes     Quit date: 2004 Years since quittin 4   • Smokeless tobacco: Never   Vaping Use   • Vaping Use: Never used   Substance and Sexual Activity   • Alcohol use: Yes   • Drug use: Never   • Sexual activity: Yes     Partners: Male     Birth control/protection: I U D  Comment: no new partner in past year   Other Topics Concern   • Not on file   Social History Narrative   • Not on file     Social Determinants of Health     Financial Resource Strain: Not on file   Food Insecurity: Not on file   Transportation Needs: Not on file   Physical Activity: Not on file   Stress: Not on file   Social Connections: Not on file   Intimate Partner Violence: Not on file   Housing Stability: Not on file       Current Outpatient Medications:   •  Levonorgestrel (MIRENA) 20 MCG/DAY IUD, 1 each by Intrauterine route once, Disp: , Rfl:   No Known Allergies    Vitals:    23 0954   BP: 120/70   Pulse: 65   Resp: 16   Temp: 98 1 °F (36 7 °C)   SpO2: 98%       Physical Exam   General: Appears well, appears stated age  Skin: Warm, anicteric  HEENT: Normocephalic, atraumatic; sclera aniceteric, mucous membranes moist; cervical nodes without adenopathy  Cardiopulmonary: RRR, Easy WOB, no BLE edema  Abd: Flat and soft, nontender, no masses appreciated, no hepatosplenomegaly  MSK: Symmetric, no cyanosis, no overt weakness  Lymphatic: No cervical, axillary or inguinal lymphadenopathy  Neuro: Affect appropriate, no gross motor abnormalities      Pathology:  Final Diagnosis   A  Transverse colon polyp cold snare, polypectomy:  -   Fragments of sessile serrated adenoma (deeper levels examined)  -   Separate scant fragments of tubular adenoma, likely tissue contaminant  B  Sigmoid colon polyp hot snare, polypectomy:  -   Invasive adenocarcinoma, moderately differentiated, arising in fragments of traditional serrated adenoma  -   MMR (MLH1, MSH2, MSH6 and PMS2) studies by immunohistochemistry on B2 are pending   -   See comment and synoptic report  Labs: Reviewed in EPIC    Imaging  CT chest abdomen pelvis w contrast    Result Date: 6/12/2023  Narrative: CT CHEST, ABDOMEN AND PELVIS WITH IV CONTRAST INDICATION:   C18 9: Malignant neoplasm of colon, unspecified  COMPARISON:  None  TECHNIQUE: CT examination of the chest, abdomen and pelvis was performed  Multiplanar 2D reformatted images were created from the source data  This examination, like all CT scans performed in the [facility], was performed utilizing techniques to minimize radiation dose exposure, including the use of iterative reconstruction and automated exposure control  Radiation dose length product (DLP) for this visit:  717 16 mGy-cm IV Contrast:  80 mL of iohexol (OMNIPAQUE) Enteric Contrast: Enteric contrast was not administered  FINDINGS: CHEST LUNGS: No worrisome pulmonary nodule  There is no tracheal or endobronchial lesion  PLEURA:  Unremarkable  HEART/GREAT VESSELS: Heart is unremarkable for patient's age  No thoracic aortic aneurysm  MEDIASTINUM AND BRYAN:  Unremarkable  CHEST WALL AND LOWER NECK:  Unremarkable  ABDOMEN LIVER/BILIARY TREE:  Unremarkable  GALLBLADDER:  No calcified gallstones  No pericholecystic inflammatory change  SPLEEN:  Unremarkable  PANCREAS:  Unremarkable  ADRENAL GLANDS:  Unremarkable  KIDNEYS/URETERS: One or more sharply circumscribed subcentimeter renal hypodensities are noted  These lesions are too small to accurately characterize, but are statistically most likely to represent benign cortical renal cyst(s)  According to the guidelines published in the [facility] Paper of the ACR Incidental Findings Committee (Radiology 2010), no further workup of these lesions is recommended  No hydronephrosis  STOMACH AND BOWEL:  Unremarkable  APPENDIX:  No findings to suggest appendicitis  ABDOMINOPELVIC CAVITY:  No ascites  No pneumoperitoneum  No lymphadenopathy  VESSELS:  Unremarkable for patient's age   PELVIS REPRODUCTIVE ORGANS: There is an intrauterine device in the uterus  URINARY BLADDER:  Unremarkable  ABDOMINAL WALL/INGUINAL REGIONS:  Unremarkable  OSSEOUS STRUCTURES:  No acute fracture or destructive osseous lesion  Impression: No evidence for metastatic disease in the chest, abdomen, or pelvis  Workstation performed: [id number]     Colonoscopy    Result Date: 5/23/2023  Narrative: Table formatting from the original result was not included  [address] [place] [address] [address] [phone] DATE OF SERVICE: 5/23/23 PHYSICIAN(S): Attending: Danford Buerger, DO Fellow: No Staff Documented INDICATION: Screening for colon cancer POST-OP DIAGNOSIS: See the impression below  HISTORY: Prior colonoscopy: No prior colonoscopy  BOWEL PREPARATION: Golytely/Colyte/Trilyte PREPROCEDURE: Informed consent was obtained for the procedure, including sedation  Risks including but not limited to bleeding, infection, perforation, adverse drug reaction and aspiration were explained in detail  Also explained about less than 100% sensitivity with the exam and other alternatives  The patient was placed in the left lateral decubitus position  Procedure: Colonoscopy DETAILS OF PROCEDURE: Patient was taken to the procedure room where a time out was performed to confirm correct patient and correct procedure  The patient underwent monitored anesthesia care, which was administered by an anesthesia professional  The patient's blood pressure, heart rate, level of consciousness, oxygen and respirations were monitored throughout the procedure  A digital rectal exam was performed  The scope was introduced through the anus and advanced to the cecum  Retroflexion was performed in the rectum  The quality of bowel preparation was evaluated using the [facility] Bowel Preparation Scale with scores of: right colon = 3, transverse colon = 3, left colon = 3  The total BBPS score was 9  Bowel prep was adequate   The patient experienced no blood loss  The procedure was not difficult  The patient tolerated the procedure well  There were no apparent complications  ANESTHESIA INFORMATION: ASA: I Anesthesia Type: IV Sedation with Anesthesia MEDICATIONS: lactated ringers infusion 500 mL*  *From user-documented volume (Totals for administrations occurring from 0723 to 0843 on 05/23/23) FINDINGS: 25 mm pedunculated, adenomatous-appearing polyp in the sigmoid colon 28 cm from the anal verge; no bleeding was identified; completely removed in piecemeal fashion by hot snare and retrieved specimen; tattooed distal to the finding with kirk ink Few moderate scattered diverticula in the sigmoid colon 5 mm sessile polyp in the transverse colon; removed en bloc by cold snare and retrieved specimen EVENTS: Procedure Events Event Event Time ENDO CECUM REACHED 5/23/2023  7:32 AM ENDO SCOPE OUT TIME 5/23/2023  8:42 AM SPECIMENS: ID Type Source Tests Collected by Time Destination 1 : transverse colon polyp cold snare Tissue Large Intestine, Transverse Colon TISSUE EXAM Denis Rodriguez,  5/23/2023  8:43 AM  2 : sigmoid colon polyp hot snare Tissue Large Intestine, Sigmoid Colon TISSUE EXAM Denis Rodriguez,  5/23/2023  8:44 AM  EQUIPMENT: Colonoscope -[id number]     Impression: 1 polyp removed in the transverse colon with cold snare 1 large pedunculated polyp removed piecemeal from the sigmoid colon, tattoo applied at polypectomy site RECOMMENDATION:  Repeat colonoscopy in 3 years  Personal history of colon polyps  Plan flex sig in 6 months to assess polypectomy site, and full colonoscopy in 3 years Endoscopist will call with biopsy results within 2 weeks Avoid aspirin and other NSAIDs for 1 week due to polypectomy  Denis Rodriguez DO       I independently reviewed and interpreted the above laboratory and imaging data, including cross sectional imaging, scope reports, path  Discussed with Dr Dhara Barker  Discussion/Summary:    This is a 14-year-old female with a likely early stage sigmoid colon cancer  We discussed this diagnosis and the recommendation for partial colectomy  We discussed the procedure and the expected hospital course  We discussed the risks to include infection, bleeding, anastomotic leak, need for additional procedures or temporary diversion  We discussed this as well the more global risks of MI, PE, DVT, stroke, death due to the overall body stress of surgery  The patient is at average risk to her her young age and robust physical status  We discussed that additional recommendations will be made following surgery depending on the final pathology  All questions were answered today of the patient and her

## 2023-06-13 NOTE — H&P (VIEW-ONLY)
Surgical Oncology Consultation    [address] CANCER CARE SURGICAL ONCOLOGY [place]  [address] [facility] [address] [phone]    Patient:  Alec Valerio  1970  [id number]    Primary Care provider:  Liliam Hernandez DO  [address]  [facility] [place] [address]    Referring provider:  Nilesh Pan DO  [address] [facility]  [address]  \[facility]\"",  [address] [place] [address] Ext    Diagnoses and all orders for this visit:    Colon adenocarcinoma [facility])  -     Case request operating room: SIGMOID RESECTION COLON LAPAROSCOPIC W ROBOTICS; Standing  -     Case request operating room: SIGMOID RESECTION COLON LAPAROSCOPIC W ROBOTICS    Adenocarcinoma of colon [facility])  -     Ambulatory Referral to Surgical Oncology  -     CBC and differential; Future  -     Comprehensive metabolic panel; Future  -     APTT; Future  -     Protime-INR; Future  -     HEMOGLOBIN A1C W/ EAG ESTIMATION; Future  -     Type and screen; Future  -     EKG 12 lead; Future  -     CBC and differential  -     Comprehensive metabolic panel  -     APTT  -     Protime-INR    Other orders  -     Incentive spirometry; Standing  -     Insert and maintain IV line; Standing  -     Void On-Call to O R ; Standing  -     Place sequential compression device; Standing  -     ceFAZolin (ANCEF) IVPB (premix in dextrose) 2,000 mg 50 mL  -     metroNIDAZOLE (FLAGYL) IVPB (premix) 500 mg 100 mL        Chief Complaint   Patient presents with   • Consult       No follow-ups on file  Oncology History   Colon adenocarcinoma ([facility] [address] )   5/23/2023 Biopsy    A  Transverse colon polyp cold snare, polypectomy:  -   Fragments of sessile serrated adenoma (deeper levels examined)  -   Separate scant fragments of tubular adenoma, likely tissue contaminant  B  Sigmoid colon polyp hot snare, polypectomy:  -   Invasive adenocarcinoma, moderately differentiated, arising in fragments of traditional serrated adenoma    -   MMR (MLH1, MSH2, MSH6 and PMS2) studies by immunohistochemistry on B2 are pending   -   See comment and synoptic report  6/8/2023 Initial Diagnosis    Colon adenocarcinoma (HCC)         History of Present Illness  : This is a 39-year-old female who presents today for consultation regarding an adenocarcinoma  The patient underwent her first screening colonoscopy, revealing 2 polyps, one at the transverse colon demonstrating sessile serrated adenoma histology, and the other at the sigmoid colon, demonstrating invasive adenocarcinoma, moderately differentiated  The sigmoid polyp was marked with a tattoo just distal to the polyp  The patient is asymptomatic, with no abdominal pain, no nausea or vomiting, no change in stool habits, no blood in her stool  She has no history of GI complaints, and no history abdominal surgery  She is very healthy with no cardiopulmonary history, and is very active and exercises regularly  Review of Systems  Complete ROS Surg Onc:   Constitutional: The patient denies new or recent history of general fatigue, no recent weight loss, no change in appetite  Eyes: No complaints of visual problems, no scleral icterus  ENT: No complaints of ear pain, no hoarseness, no difficulty swallowing,  no tinnitus and no new masses in head, oral cavity, or neck  Cardiovascular: No complaints of chest pain, no palpitations, no ankle edema  Respiratory: No complaints of shortness of breath, no cough  Gastrointestinal: No complaints of jaundice, no bloody stools, no pale stools  Genitourinary: No complaints of dysuria, no hematuria, no nocturia, no frequent urination, no urethral discharge  Musculoskeletal: No complaints of weakness, paralysis, joint stiffness or arthralgias  Integumentary: No complaints of rash, no new lesions  Neurological: No complaints of convulsions, no seizures, no dizziness  Hematologic/Lymphatic: No complaints of easy bruising  Endocrine:  No hot or cold intolerance    No polydipsia, polyphagia, or polyuria  Allergy/immunology:  No environmental allergies  No food allergies  Not immunocompromised  Patient Active Problem List   Diagnosis   • Colon adenocarcinoma [facility])     Past Medical History:   Diagnosis Date   • Abnormal Pap smear of cervix    • Sinus congestion     chronic   • Varicella      Past Surgical History:   Procedure Laterality Date   • BREAST EXCISIONAL BIOPSY Right 2008    benign   • US BREAST NEEDLE LOC RIGHT Right 10/08/2008     Family History   Problem Relation Age of Onset   • Diabetes Mother    • No Known Problems Father    • Breast cancer Maternal Grandmother         50-60's   • Stroke Maternal Grandmother    • Diabetes Maternal Grandmother    • Cancer Maternal Grandfather    • No Known Problems Paternal Grandmother    • No Known Problems Brother    • No Known Problems Half-Sister    • Breast cancer Maternal Aunt         68-70   • Endometrial cancer Maternal Aunt         maybe 40's   • No Known Problems Maternal Aunt    • Colon cancer Neg Hx    • Ovarian cancer Neg Hx      Social History     Socioeconomic History   • Marital status: /Civil Union     Spouse name: Not on file   • Number of children: Not on file   • Years of education: Not on file   • Highest education level: Not on file   Occupational History   • Not on file   Tobacco Use   • Smoking status: Former     Types: Cigarettes     Quit date:      Years since quittin 4   • Smokeless tobacco: Never   Vaping Use   • Vaping Use: Never used   Substance and Sexual Activity   • Alcohol use: Yes   • Drug use: Never   • Sexual activity: Yes     Partners: Male     Birth control/protection: I U D       Comment: no new partner in past year   Other Topics Concern   • Not on file   Social History Narrative   • Not on file     Social Determinants of Health     Financial Resource Strain: Not on file   Food Insecurity: Not on file   Transportation Needs: Not on file   Physical Activity: Not on file   Stress: Not on file   Social Connections: Not on file   Intimate Partner Violence: Not on file   Housing Stability: Not on file       Current Outpatient Medications:   •  Levonorgestrel (MIRENA) 20 MCG/DAY IUD, 1 each by Intrauterine route once, Disp: , Rfl:   No Known Allergies    Vitals:    06/13/23 0954   BP: 120/70   Pulse: 65   Resp: 16   Temp: 98 1 °F (36 7 °C)   SpO2: 98%       Physical Exam   General: Appears well, appears stated age  Skin: Warm, anicteric  HEENT: Normocephalic, atraumatic; sclera aniceteric, mucous membranes moist; cervical nodes without adenopathy  Cardiopulmonary: RRR, Easy WOB, no BLE edema  Abd: Flat and soft, nontender, no masses appreciated, no hepatosplenomegaly  MSK: Symmetric, no cyanosis, no overt weakness  Lymphatic: No cervical, axillary or inguinal lymphadenopathy  Neuro: Affect appropriate, no gross motor abnormalities      Pathology:  Final Diagnosis   A  Transverse colon polyp cold snare, polypectomy:  -   Fragments of sessile serrated adenoma (deeper levels examined)  -   Separate scant fragments of tubular adenoma, likely tissue contaminant        B  Sigmoid colon polyp hot snare, polypectomy:  -   Invasive adenocarcinoma, moderately differentiated, arising in fragments of traditional serrated adenoma  -   MMR (MLH1, MSH2, MSH6 and PMS2) studies by immunohistochemistry on B2 are pending   -   See comment and synoptic report             Labs: Reviewed in EPIC    Imaging  CT chest abdomen pelvis w contrast    Result Date: 6/12/2023  Narrative: CT CHEST, ABDOMEN AND PELVIS WITH IV CONTRAST INDICATION:   C18 9: Malignant neoplasm of colon, unspecified  COMPARISON:  None  TECHNIQUE: CT examination of the chest, abdomen and pelvis was performed  Multiplanar 2D reformatted images were created from the source data   This examination, like all CT scans performed in the [facility], was performed utilizing techniques to minimize radiation dose exposure, including the use of iterative reconstruction and automated exposure control  Radiation dose length product (DLP) for this visit:  717 16 mGy-cm IV Contrast:  80 mL of iohexol (OMNIPAQUE) Enteric Contrast: Enteric contrast was not administered  FINDINGS: CHEST LUNGS: No worrisome pulmonary nodule  There is no tracheal or endobronchial lesion  PLEURA:  Unremarkable  HEART/GREAT VESSELS: Heart is unremarkable for patient's age  No thoracic aortic aneurysm  MEDIASTINUM AND BRYAN:  Unremarkable  CHEST WALL AND LOWER NECK:  Unremarkable  ABDOMEN LIVER/BILIARY TREE:  Unremarkable  GALLBLADDER:  No calcified gallstones  No pericholecystic inflammatory change  SPLEEN:  Unremarkable  PANCREAS:  Unremarkable  ADRENAL GLANDS:  Unremarkable  KIDNEYS/URETERS: One or more sharply circumscribed subcentimeter renal hypodensities are noted  These lesions are too small to accurately characterize, but are statistically most likely to represent benign cortical renal cyst(s)  According to the guidelines published in the [facility] Paper of the ACR Incidental Findings Committee (Radiology 2010), no further workup of these lesions is recommended  No hydronephrosis  STOMACH AND BOWEL:  Unremarkable  APPENDIX:  No findings to suggest appendicitis  ABDOMINOPELVIC CAVITY:  No ascites  No pneumoperitoneum  No lymphadenopathy  VESSELS:  Unremarkable for patient's age  PELVIS REPRODUCTIVE ORGANS: There is an intrauterine device in the uterus  URINARY BLADDER:  Unremarkable  ABDOMINAL WALL/INGUINAL REGIONS:  Unremarkable  OSSEOUS STRUCTURES:  No acute fracture or destructive osseous lesion  Impression: No evidence for metastatic disease in the chest, abdomen, or pelvis  Workstation performed: [id number]     Colonoscopy    Result Date: 5/23/2023  Narrative: Table formatting from the original result was not included   [address] [place] [address] [address] [phone] DATE OF SERVICE: 5/23/23 PHYSICIAN(S): Attending: Rajwinder Pope DO Fellow: No Staff Documented INDICATION: Screening for colon cancer POST-OP DIAGNOSIS: See the impression below  HISTORY: Prior colonoscopy: No prior colonoscopy  BOWEL PREPARATION: Golytely/Colyte/Trilyte PREPROCEDURE: Informed consent was obtained for the procedure, including sedation  Risks including but not limited to bleeding, infection, perforation, adverse drug reaction and aspiration were explained in detail  Also explained about less than 100% sensitivity with the exam and other alternatives  The patient was placed in the left lateral decubitus position  Procedure: Colonoscopy DETAILS OF PROCEDURE: Patient was taken to the procedure room where a time out was performed to confirm correct patient and correct procedure  The patient underwent monitored anesthesia care, which was administered by an anesthesia professional  The patient's blood pressure, heart rate, level of consciousness, oxygen and respirations were monitored throughout the procedure  A digital rectal exam was performed  The scope was introduced through the anus and advanced to the cecum  Retroflexion was performed in the rectum  The quality of bowel preparation was evaluated using the [facility] Bowel Preparation Scale with scores of: right colon = 3, transverse colon = 3, left colon = 3  The total BBPS score was 9  Bowel prep was adequate  The patient experienced no blood loss  The procedure was not difficult  The patient tolerated the procedure well  There were no apparent complications   ANESTHESIA INFORMATION: ASA: I Anesthesia Type: IV Sedation with Anesthesia MEDICATIONS: lactated ringers infusion 500 mL*  *From user-documented volume (Totals for administrations occurring from 0723 to 0843 on 05/23/23) FINDINGS: 25 mm pedunculated, adenomatous-appearing polyp in the sigmoid colon 28 cm from the anal verge; no bleeding was identified; completely removed in piecemeal fashion by hot snare and retrieved specimen; tattooed distal to the finding with kirk ink Few moderate scattered diverticula in the sigmoid colon 5 mm sessile polyp in the transverse colon; removed en bloc by cold snare and retrieved specimen EVENTS: Procedure Events Event Event Time ENDO CECUM REACHED 5/23/2023  7:32 AM ENDO SCOPE OUT TIME 5/23/2023  8:42 AM SPECIMENS: ID Type Source Tests Collected by Time Destination 1 : transverse colon polyp cold snare Tissue Large Intestine, Transverse Colon TISSUE EXAM Jonh Stalker, DO 5/23/2023  8:43 AM  2 : sigmoid colon polyp hot snare Tissue Large Intestine, Sigmoid Colon TISSUE EXAM Jonh Stalker, DO 5/23/2023  8:44 AM  EQUIPMENT: Colonoscope -[id number]     Impression: 1 polyp removed in the transverse colon with cold snare 1 large pedunculated polyp removed piecemeal from the sigmoid colon, tattoo applied at polypectomy site RECOMMENDATION:  Repeat colonoscopy in 3 years  Personal history of colon polyps  Plan flex sig in 6 months to assess polypectomy site, and full colonoscopy in 3 years Endoscopist will call with biopsy results within 2 weeks Avoid aspirin and other NSAIDs for 1 week due to polypectomy  Jonh Stalker, DO       I independently reviewed and interpreted the above laboratory and imaging data, including cross sectional imaging, scope reports, path  Discussed with Dr Carmel De La Cruz  Discussion/Summary: This is a 58-year-old female with a likely early stage sigmoid colon cancer  We discussed this diagnosis and the recommendation for partial colectomy  We discussed the procedure and the expected hospital course  We discussed the risks to include infection, bleeding, anastomotic leak, need for additional procedures or temporary diversion  We discussed this as well the more global risks of MI, PE, DVT, stroke, death due to the overall body stress of surgery  The patient is at average risk to her her young age and robust physical status    We discussed that additional recommendations will be made following surgery depending on the final pathology  All questions were answered today of the patient and her

## 2023-06-14 ENCOUNTER — APPOINTMENT (OUTPATIENT)
Dept: LAB | Facility: HOSPITAL | Age: 53
End: 2023-06-14
Payer: COMMERCIAL

## 2023-06-14 ENCOUNTER — LAB REQUISITION (OUTPATIENT)
Dept: LAB | Facility: HOSPITAL | Age: 53
End: 2023-06-14
Payer: COMMERCIAL

## 2023-06-14 DIAGNOSIS — C18.9 ADENOCARCINOMA OF COLON (HCC): ICD-10-CM

## 2023-06-14 DIAGNOSIS — Z01.818 ENCOUNTER FOR OTHER PREPROCEDURAL EXAMINATION: ICD-10-CM

## 2023-06-14 DIAGNOSIS — C18.9: ICD-10-CM

## 2023-06-14 DIAGNOSIS — Z01.818 PREOP TESTING: ICD-10-CM

## 2023-06-14 DIAGNOSIS — C18.9 MALIGNANT NEOPLASM OF COLON, UNSPECIFIED PART OF COLON (HCC): ICD-10-CM

## 2023-06-14 LAB
ABO GROUP BLD: NORMAL
ALBUMIN SERPL BCP-MCNC: 4.4 G/DL (ref 3.5–5)
ALP SERPL-CCNC: 62 U/L (ref 34–104)
ALT SERPL W P-5'-P-CCNC: 13 U/L (ref 7–52)
ANION GAP SERPL CALCULATED.3IONS-SCNC: 6 MMOL/L (ref 4–13)
APTT PPP: 28 SECONDS (ref 23–37)
AST SERPL W P-5'-P-CCNC: 16 U/L (ref 13–39)
BASOPHILS # BLD AUTO: 0.04 THOUSANDS/ÂΜL (ref 0–0.1)
BASOPHILS NFR BLD AUTO: 1 % (ref 0–1)
BILIRUB SERPL-MCNC: 0.68 MG/DL (ref 0.2–1)
BLD GP AB SCN SERPL QL: NEGATIVE
BUN SERPL-MCNC: 20 MG/DL (ref 5–25)
CALCIUM SERPL-MCNC: 9.2 MG/DL (ref 8.4–10.2)
CHLORIDE SERPL-SCNC: 100 MMOL/L (ref 96–108)
CO2 SERPL-SCNC: 28 MMOL/L (ref 21–32)
CREAT SERPL-MCNC: 1.02 MG/DL (ref 0.6–1.3)
EOSINOPHIL # BLD AUTO: 0.16 THOUSAND/ÂΜL (ref 0–0.61)
EOSINOPHIL NFR BLD AUTO: 3 % (ref 0–6)
ERYTHROCYTE [DISTWIDTH] IN BLOOD BY AUTOMATED COUNT: 12.9 % (ref 11.6–15.1)
GFR SERPL CREATININE-BSD FRML MDRD: 62 ML/MIN/1.73SQ M
GLUCOSE P FAST SERPL-MCNC: 91 MG/DL (ref 65–99)
HCT VFR BLD AUTO: 39.6 % (ref 34.8–46.1)
HGB BLD-MCNC: 13.5 G/DL (ref 11.5–15.4)
IMM GRANULOCYTES # BLD AUTO: 0.01 THOUSAND/UL (ref 0–0.2)
IMM GRANULOCYTES NFR BLD AUTO: 0 % (ref 0–2)
INR PPP: 1.01 (ref 0.84–1.19)
LYMPHOCYTES # BLD AUTO: 1.84 THOUSANDS/ÂΜL (ref 0.6–4.47)
LYMPHOCYTES NFR BLD AUTO: 35 % (ref 14–44)
MCH RBC QN AUTO: 31.8 PG (ref 26.8–34.3)
MCHC RBC AUTO-ENTMCNC: 34.1 G/DL (ref 31.4–37.4)
MCV RBC AUTO: 93 FL (ref 82–98)
MONOCYTES # BLD AUTO: 0.41 THOUSAND/ÂΜL (ref 0.17–1.22)
MONOCYTES NFR BLD AUTO: 8 % (ref 4–12)
NEUTROPHILS # BLD AUTO: 2.74 THOUSANDS/ÂΜL (ref 1.85–7.62)
NEUTS SEG NFR BLD AUTO: 53 % (ref 43–75)
NRBC BLD AUTO-RTO: 0 /100 WBCS
PLATELET # BLD AUTO: 248 THOUSANDS/UL (ref 149–390)
PMV BLD AUTO: 11.3 FL (ref 8.9–12.7)
POTASSIUM SERPL-SCNC: 4.2 MMOL/L (ref 3.5–5.3)
PROT SERPL-MCNC: 7.4 G/DL (ref 6.4–8.4)
PROTHROMBIN TIME: 13.5 SECONDS (ref 11.6–14.5)
RBC # BLD AUTO: 4.25 MILLION/UL (ref 3.81–5.12)
RH BLD: POSITIVE
SODIUM SERPL-SCNC: 134 MMOL/L (ref 135–147)
SPECIMEN EXPIRATION DATE: NORMAL
WBC # BLD AUTO: 5.2 THOUSAND/UL (ref 4.31–10.16)

## 2023-06-14 PROCEDURE — 93005 ELECTROCARDIOGRAM TRACING: CPT

## 2023-06-14 PROCEDURE — 86900 BLOOD TYPING SEROLOGIC ABO: CPT | Performed by: STUDENT IN AN ORGANIZED HEALTH CARE EDUCATION/TRAINING PROGRAM

## 2023-06-14 PROCEDURE — 86850 RBC ANTIBODY SCREEN: CPT | Performed by: STUDENT IN AN ORGANIZED HEALTH CARE EDUCATION/TRAINING PROGRAM

## 2023-06-14 PROCEDURE — 86901 BLOOD TYPING SEROLOGIC RH(D): CPT | Performed by: STUDENT IN AN ORGANIZED HEALTH CARE EDUCATION/TRAINING PROGRAM

## 2023-06-14 PROCEDURE — 85610 PROTHROMBIN TIME: CPT

## 2023-06-14 PROCEDURE — 85730 THROMBOPLASTIN TIME PARTIAL: CPT

## 2023-06-14 PROCEDURE — 36415 COLL VENOUS BLD VENIPUNCTURE: CPT

## 2023-06-14 PROCEDURE — 85025 COMPLETE CBC W/AUTO DIFF WBC: CPT

## 2023-06-14 PROCEDURE — 80053 COMPREHEN METABOLIC PANEL: CPT

## 2023-06-14 PROCEDURE — 83036 HEMOGLOBIN GLYCOSYLATED A1C: CPT

## 2023-06-15 LAB
ATRIAL RATE: 57 BPM
EST. AVERAGE GLUCOSE BLD GHB EST-MCNC: 103 MG/DL
HBA1C MFR BLD: 5.2 %
P AXIS: 54 DEGREES
PR INTERVAL: 188 MS
QRS AXIS: 72 DEGREES
QRSD INTERVAL: 86 MS
QT INTERVAL: 446 MS
QTC INTERVAL: 434 MS
T WAVE AXIS: 54 DEGREES
VENTRICULAR RATE: 57 BPM

## 2023-06-15 PROCEDURE — 93010 ELECTROCARDIOGRAM REPORT: CPT | Performed by: INTERNAL MEDICINE

## 2023-06-19 LAB
ABO GROUP BLD: NORMAL
BLD GP AB SCN SERPL QL: NEGATIVE
RH BLD: POSITIVE
SPECIMEN EXPIRATION DATE: NORMAL

## 2023-06-19 NOTE — PRE-PROCEDURE INSTRUCTIONS
Pre-Surgery Instructions:   Medication Instructions   • Levonorgestrel (MIRENA) 20 MCG/DAY IUD Instructions provided by MD   Pre procedure instructions reviewed verbalizes understanding  NPO after MN  Bathing reviewed  Morning meds with water    Follow instructions for bowel prep   No NSAIDS 7 days prior

## 2023-06-22 ENCOUNTER — ANESTHESIA EVENT (OUTPATIENT)
Dept: PERIOP | Facility: HOSPITAL | Age: 53
DRG: 331 | End: 2023-06-22
Payer: COMMERCIAL

## 2023-06-23 ENCOUNTER — HOSPITAL ENCOUNTER (INPATIENT)
Facility: HOSPITAL | Age: 53
LOS: 3 days | Discharge: HOME | DRG: 331 | End: 2023-06-26
Attending: STUDENT IN AN ORGANIZED HEALTH CARE EDUCATION/TRAINING PROGRAM | Admitting: STUDENT IN AN ORGANIZED HEALTH CARE EDUCATION/TRAINING PROGRAM
Payer: COMMERCIAL

## 2023-06-23 ENCOUNTER — ANESTHESIA (OUTPATIENT)
Dept: PERIOP | Facility: HOSPITAL | Age: 53
DRG: 331 | End: 2023-06-23
Payer: COMMERCIAL

## 2023-06-23 DIAGNOSIS — C18.9 COLON ADENOCARCINOMA (HCC): ICD-10-CM

## 2023-06-23 LAB
ABO GROUP BLD: NORMAL
EXT PREGNANCY TEST URINE: NEGATIVE
EXT. CONTROL: NORMAL
GLUCOSE SERPL-MCNC: 160 MG/DL (ref 65–140)
RH BLD: POSITIVE

## 2023-06-23 PROCEDURE — C9113 INJ PANTOPRAZOLE SODIUM, VIA: HCPCS | Performed by: SURGERY

## 2023-06-23 PROCEDURE — 0DTN4ZZ RESECTION OF SIGMOID COLON, PERCUTANEOUS ENDOSCOPIC APPROACH: ICD-10-PCS | Performed by: STUDENT IN AN ORGANIZED HEALTH CARE EDUCATION/TRAINING PROGRAM

## 2023-06-23 PROCEDURE — 81025 URINE PREGNANCY TEST: CPT | Performed by: STUDENT IN AN ORGANIZED HEALTH CARE EDUCATION/TRAINING PROGRAM

## 2023-06-23 PROCEDURE — 88309 TISSUE EXAM BY PATHOLOGIST: CPT | Performed by: PATHOLOGY

## 2023-06-23 PROCEDURE — 82948 REAGENT STRIP/BLOOD GLUCOSE: CPT

## 2023-06-23 RX ORDER — MIDAZOLAM HYDROCHLORIDE 2 MG/2ML
INJECTION, SOLUTION INTRAMUSCULAR; INTRAVENOUS AS NEEDED
Status: DISCONTINUED | OUTPATIENT
Start: 2023-06-23 | End: 2023-06-23

## 2023-06-23 RX ORDER — PROPOFOL 10 MG/ML
INJECTION, EMULSION INTRAVENOUS AS NEEDED
Status: DISCONTINUED | OUTPATIENT
Start: 2023-06-23 | End: 2023-06-23

## 2023-06-23 RX ORDER — FENTANYL CITRATE/PF 50 MCG/ML
25 SYRINGE (ML) INJECTION
Status: DISCONTINUED | OUTPATIENT
Start: 2023-06-23 | End: 2023-06-23 | Stop reason: HOSPADM

## 2023-06-23 RX ORDER — GABAPENTIN 300 MG/1
300 CAPSULE ORAL ONCE
Status: COMPLETED | OUTPATIENT
Start: 2023-06-23 | End: 2023-06-23

## 2023-06-23 RX ORDER — HYDROMORPHONE HCL/PF 1 MG/ML
SYRINGE (ML) INJECTION AS NEEDED
Status: DISCONTINUED | OUTPATIENT
Start: 2023-06-23 | End: 2023-06-23

## 2023-06-23 RX ORDER — HYDROMORPHONE HCL/PF 1 MG/ML
0.5 SYRINGE (ML) INJECTION
Status: DISCONTINUED | OUTPATIENT
Start: 2023-06-23 | End: 2023-06-23 | Stop reason: HOSPADM

## 2023-06-23 RX ORDER — CELECOXIB 200 MG/1
200 CAPSULE ORAL 2 TIMES DAILY
Status: DISCONTINUED | OUTPATIENT
Start: 2023-06-23 | End: 2023-06-26 | Stop reason: HOSPADM

## 2023-06-23 RX ORDER — SODIUM CHLORIDE, SODIUM LACTATE, POTASSIUM CHLORIDE, CALCIUM CHLORIDE 600; 310; 30; 20 MG/100ML; MG/100ML; MG/100ML; MG/100ML
125 INJECTION, SOLUTION INTRAVENOUS CONTINUOUS
Status: DISCONTINUED | OUTPATIENT
Start: 2023-06-23 | End: 2023-06-24

## 2023-06-23 RX ORDER — ACETAMINOPHEN 325 MG/1
975 TABLET ORAL ONCE
Status: COMPLETED | OUTPATIENT
Start: 2023-06-23 | End: 2023-06-23

## 2023-06-23 RX ORDER — LIDOCAINE HYDROCHLORIDE 10 MG/ML
0.5 INJECTION, SOLUTION EPIDURAL; INFILTRATION; INTRACAUDAL; PERINEURAL ONCE AS NEEDED
Status: DISCONTINUED | OUTPATIENT
Start: 2023-06-23 | End: 2023-06-26 | Stop reason: HOSPADM

## 2023-06-23 RX ORDER — BUPIVACAINE HYDROCHLORIDE 2.5 MG/ML
INJECTION, SOLUTION EPIDURAL; INFILTRATION; INTRACAUDAL AS NEEDED
Status: DISCONTINUED | OUTPATIENT
Start: 2023-06-23 | End: 2023-06-23 | Stop reason: HOSPADM

## 2023-06-23 RX ORDER — ROCURONIUM BROMIDE 10 MG/ML
INJECTION, SOLUTION INTRAVENOUS AS NEEDED
Status: DISCONTINUED | OUTPATIENT
Start: 2023-06-23 | End: 2023-06-23

## 2023-06-23 RX ORDER — ONDANSETRON 2 MG/ML
4 INJECTION INTRAMUSCULAR; INTRAVENOUS EVERY 6 HOURS PRN
Status: DISCONTINUED | OUTPATIENT
Start: 2023-06-23 | End: 2023-06-26 | Stop reason: HOSPADM

## 2023-06-23 RX ORDER — GLYCOPYRROLATE 0.2 MG/ML
INJECTION INTRAMUSCULAR; INTRAVENOUS AS NEEDED
Status: DISCONTINUED | OUTPATIENT
Start: 2023-06-23 | End: 2023-06-23

## 2023-06-23 RX ORDER — LABETALOL HYDROCHLORIDE 5 MG/ML
10 INJECTION, SOLUTION INTRAVENOUS EVERY 4 HOURS PRN
Status: DISCONTINUED | OUTPATIENT
Start: 2023-06-23 | End: 2023-06-26 | Stop reason: HOSPADM

## 2023-06-23 RX ORDER — PANTOPRAZOLE SODIUM 40 MG/10ML
40 INJECTION, POWDER, LYOPHILIZED, FOR SOLUTION INTRAVENOUS
Status: DISCONTINUED | OUTPATIENT
Start: 2023-06-23 | End: 2023-06-26

## 2023-06-23 RX ORDER — NEOSTIGMINE METHYLSULFATE 1 MG/ML
INJECTION INTRAVENOUS AS NEEDED
Status: DISCONTINUED | OUTPATIENT
Start: 2023-06-23 | End: 2023-06-23

## 2023-06-23 RX ORDER — GABAPENTIN 300 MG/1
300 CAPSULE ORAL 3 TIMES DAILY
Status: DISCONTINUED | OUTPATIENT
Start: 2023-06-23 | End: 2023-06-26 | Stop reason: HOSPADM

## 2023-06-23 RX ORDER — EPHEDRINE SULFATE 50 MG/ML
INJECTION INTRAVENOUS AS NEEDED
Status: DISCONTINUED | OUTPATIENT
Start: 2023-06-23 | End: 2023-06-23

## 2023-06-23 RX ORDER — INDOCYANINE GREEN AND WATER 25 MG
KIT INJECTION AS NEEDED
Status: DISCONTINUED | OUTPATIENT
Start: 2023-06-23 | End: 2023-06-23

## 2023-06-23 RX ORDER — SODIUM CHLORIDE 9 MG/ML
INJECTION, SOLUTION INTRAVENOUS CONTINUOUS PRN
Status: DISCONTINUED | OUTPATIENT
Start: 2023-06-23 | End: 2023-06-23

## 2023-06-23 RX ORDER — LIDOCAINE HYDROCHLORIDE 20 MG/ML
INJECTION, SOLUTION EPIDURAL; INFILTRATION; INTRACAUDAL; PERINEURAL AS NEEDED
Status: DISCONTINUED | OUTPATIENT
Start: 2023-06-23 | End: 2023-06-23

## 2023-06-23 RX ORDER — CEFAZOLIN SODIUM 1 G/3ML
INJECTION, POWDER, FOR SOLUTION INTRAMUSCULAR; INTRAVENOUS AS NEEDED
Status: DISCONTINUED | OUTPATIENT
Start: 2023-06-23 | End: 2023-06-23

## 2023-06-23 RX ORDER — ACETAMINOPHEN 325 MG/1
975 TABLET ORAL EVERY 6 HOURS SCHEDULED
Status: DISCONTINUED | OUTPATIENT
Start: 2023-06-23 | End: 2023-06-26 | Stop reason: HOSPADM

## 2023-06-23 RX ORDER — FENTANYL CITRATE 50 UG/ML
INJECTION, SOLUTION INTRAMUSCULAR; INTRAVENOUS AS NEEDED
Status: DISCONTINUED | OUTPATIENT
Start: 2023-06-23 | End: 2023-06-23

## 2023-06-23 RX ORDER — ONDANSETRON 2 MG/ML
4 INJECTION INTRAMUSCULAR; INTRAVENOUS ONCE AS NEEDED
Status: DISCONTINUED | OUTPATIENT
Start: 2023-06-23 | End: 2023-06-23 | Stop reason: HOSPADM

## 2023-06-23 RX ORDER — DIPHENHYDRAMINE HYDROCHLORIDE 50 MG/ML
25 INJECTION INTRAMUSCULAR; INTRAVENOUS EVERY 6 HOURS PRN
Status: DISCONTINUED | OUTPATIENT
Start: 2023-06-23 | End: 2023-06-26 | Stop reason: HOSPADM

## 2023-06-23 RX ORDER — METRONIDAZOLE 500 MG/100ML
500 INJECTION, SOLUTION INTRAVENOUS ONCE
Status: COMPLETED | OUTPATIENT
Start: 2023-06-23 | End: 2023-06-23

## 2023-06-23 RX ORDER — MELATONIN
2000 DAILY
COMMUNITY

## 2023-06-23 RX ORDER — ONDANSETRON 2 MG/ML
INJECTION INTRAMUSCULAR; INTRAVENOUS AS NEEDED
Status: DISCONTINUED | OUTPATIENT
Start: 2023-06-23 | End: 2023-06-23

## 2023-06-23 RX ORDER — DEXAMETHASONE SODIUM PHOSPHATE 10 MG/ML
INJECTION, SOLUTION INTRAMUSCULAR; INTRAVENOUS AS NEEDED
Status: DISCONTINUED | OUTPATIENT
Start: 2023-06-23 | End: 2023-06-23

## 2023-06-23 RX ORDER — CEFAZOLIN SODIUM 2 G/50ML
2000 SOLUTION INTRAVENOUS ONCE
Status: DISCONTINUED | OUTPATIENT
Start: 2023-06-23 | End: 2023-06-26 | Stop reason: HOSPADM

## 2023-06-23 RX ORDER — HEPARIN SODIUM 5000 [USP'U]/ML
5000 INJECTION, SOLUTION INTRAVENOUS; SUBCUTANEOUS EVERY 8 HOURS SCHEDULED
Status: DISCONTINUED | OUTPATIENT
Start: 2023-06-23 | End: 2023-06-26

## 2023-06-23 RX ADMIN — PANTOPRAZOLE SODIUM 40 MG: 40 INJECTION, POWDER, FOR SOLUTION INTRAVENOUS at 20:10

## 2023-06-23 RX ADMIN — EPHEDRINE SULFATE 5 MG: 50 INJECTION INTRAVENOUS at 11:07

## 2023-06-23 RX ADMIN — INDOCYANINE GREEN AND WATER 7.5 MG: KIT at 09:34

## 2023-06-23 RX ADMIN — MIDAZOLAM 2 MG: 1 INJECTION INTRAMUSCULAR; INTRAVENOUS at 07:23

## 2023-06-23 RX ADMIN — GABAPENTIN 300 MG: 300 CAPSULE ORAL at 20:08

## 2023-06-23 RX ADMIN — EPHEDRINE SULFATE 5 MG: 50 INJECTION INTRAVENOUS at 08:01

## 2023-06-23 RX ADMIN — ACETAMINOPHEN 975 MG: 325 TABLET ORAL at 06:10

## 2023-06-23 RX ADMIN — ONDANSETRON 4 MG: 2 INJECTION INTRAMUSCULAR; INTRAVENOUS at 11:28

## 2023-06-23 RX ADMIN — ROCURONIUM BROMIDE 50 MG: 10 INJECTION, SOLUTION INTRAVENOUS at 09:21

## 2023-06-23 RX ADMIN — LIDOCAINE HYDROCHLORIDE 80 MG: 20 INJECTION, SOLUTION EPIDURAL; INFILTRATION; INTRACAUDAL; PERINEURAL at 07:36

## 2023-06-23 RX ADMIN — EPHEDRINE SULFATE 10 MG: 50 INJECTION INTRAVENOUS at 08:04

## 2023-06-23 RX ADMIN — SODIUM CHLORIDE: 0.9 INJECTION, SOLUTION INTRAVENOUS at 07:43

## 2023-06-23 RX ADMIN — METRONIDAZOLE: 500 INJECTION, SOLUTION INTRAVENOUS at 07:59

## 2023-06-23 RX ADMIN — ROCURONIUM BROMIDE 20 MG: 10 INJECTION, SOLUTION INTRAVENOUS at 10:47

## 2023-06-23 RX ADMIN — ROCURONIUM BROMIDE 50 MG: 10 INJECTION, SOLUTION INTRAVENOUS at 08:13

## 2023-06-23 RX ADMIN — NEOSTIGMINE METHYLSULFATE 3 MG: 1 INJECTION INTRAVENOUS at 10:49

## 2023-06-23 RX ADMIN — FENTANYL CITRATE 50 MCG: 50 INJECTION INTRAMUSCULAR; INTRAVENOUS at 07:36

## 2023-06-23 RX ADMIN — GLYCOPYRROLATE 0.6 MCG: 0.2 INJECTION, SOLUTION INTRAMUSCULAR; INTRAVENOUS at 10:49

## 2023-06-23 RX ADMIN — DEXAMETHASONE SODIUM PHOSPHATE 10 MG: 10 INJECTION, SOLUTION INTRAMUSCULAR; INTRAVENOUS at 07:38

## 2023-06-23 RX ADMIN — CEFAZOLIN 1000 MG: 1 INJECTION, POWDER, FOR SOLUTION INTRAMUSCULAR; INTRAVENOUS at 07:55

## 2023-06-23 RX ADMIN — SODIUM CHLORIDE, SODIUM LACTATE, POTASSIUM CHLORIDE, AND CALCIUM CHLORIDE: .6; .31; .03; .02 INJECTION, SOLUTION INTRAVENOUS at 09:42

## 2023-06-23 RX ADMIN — SODIUM CHLORIDE, SODIUM LACTATE, POTASSIUM CHLORIDE, AND CALCIUM CHLORIDE: .6; .31; .03; .02 INJECTION, SOLUTION INTRAVENOUS at 07:25

## 2023-06-23 RX ADMIN — CELECOXIB 200 MG: 200 CAPSULE ORAL at 20:09

## 2023-06-23 RX ADMIN — GABAPENTIN 300 MG: 300 CAPSULE ORAL at 06:12

## 2023-06-23 RX ADMIN — FENTANYL CITRATE 25 MCG: 50 INJECTION, SOLUTION INTRAMUSCULAR; INTRAVENOUS at 12:25

## 2023-06-23 RX ADMIN — FENTANYL CITRATE 50 MCG: 50 INJECTION INTRAMUSCULAR; INTRAVENOUS at 08:29

## 2023-06-23 RX ADMIN — PROPOFOL 150 MG: 10 INJECTION, EMULSION INTRAVENOUS at 07:36

## 2023-06-23 RX ADMIN — FENTANYL CITRATE 25 MCG: 50 INJECTION, SOLUTION INTRAMUSCULAR; INTRAVENOUS at 12:50

## 2023-06-23 RX ADMIN — HYDROMORPHONE HYDROCHLORIDE: 10 INJECTION, SOLUTION INTRAMUSCULAR; INTRAVENOUS; SUBCUTANEOUS at 13:22

## 2023-06-23 RX ADMIN — FENTANYL CITRATE 25 MCG: 50 INJECTION, SOLUTION INTRAMUSCULAR; INTRAVENOUS at 12:43

## 2023-06-23 RX ADMIN — FENTANYL CITRATE 25 MCG: 50 INJECTION, SOLUTION INTRAMUSCULAR; INTRAVENOUS at 12:32

## 2023-06-23 RX ADMIN — HYDROMORPHONE HYDROCHLORIDE 0.5 MG: 1 INJECTION, SOLUTION INTRAMUSCULAR; INTRAVENOUS; SUBCUTANEOUS at 11:01

## 2023-06-23 RX ADMIN — PROPOFOL 50 MG: 10 INJECTION, EMULSION INTRAVENOUS at 07:38

## 2023-06-23 NOTE — INTERVAL H&P NOTE
H&P reviewed  After examining the patient I find no changes in the patients condition since the H&P had been written      Vitals:    06/23/23 0559   BP: 125/80   Pulse: 61   Resp: 16   Temp: 98 6 °F (37 °C)   SpO2: 98%

## 2023-06-23 NOTE — PROGRESS NOTES
Progress Note - Zbigniew Gunn 47 y o  female MRN: [id number]    [facility]: [id number]      Assessment:  48 y/o F w sigmoid neoplasm s/p robotic sigmoidectomy on 6/23  Doing well  Vss  Afebrile  abd s/ nt/ nd  Incision c/d/i    Plan:  Clear liq diet, add toast crackers  Maintenance ivf  Dc flor  Dc pca  Continue multimodal analgesia  Celebrex, rachid, tylenol  Out of bed ambulate  dvt ppx    Subjective:   No flatus  No bowel mvt  Pain well controlled  Denied cp or sob  Denied fever or chills  Objective:     Vitals: Blood pressure 136/73, pulse 71, temperature 97 7 °F (36 5 °C), resp  rate 17, SpO2 94 %, not currently breastfeeding  ,There is no height or weight on file to calculate BMI  Intake/Output Summary (Last 24 hours) at 6/24/2023 0814  Last data filed at 6/24/2023 0501  Gross per 24 hour   Intake 2709 ml   Output 3155 ml   Net -446 ml       Physical Exam  General: NAD  HEENT: NC/AT  MMM  Cv: RRR     Lungs: normal effort  Ab: Soft, NT/ND  Ex: no CCE  Neuro: AAOx3    Scheduled Meds:  Current Facility-Administered Medications   Medication Dose Route Frequency Provider Last Rate   • acetaminophen  975 mg Oral Q6H Brenda Irwin MD     • cefazolin  2,000 mg Intravenous Once Christian Young MD     • celecoxib  200 mg Oral BID Christian Young MD     • dextrose 5 % and sodium chloride 0 45 % with KCl 20 mEq/L  75 mL/hr Intravenous Continuous Christian Young MD     • diphenhydrAMINE  25 mg Intravenous Q6H PRN Christian Young MD     • gabapentin  300 mg Oral TID Christian Young MD     • heparin (porcine)  5,000 Units Subcutaneous [facility] Christian Young MD     • labetalol  10 mg Intravenous Q4H PRN Christian Young MD     • lidocaine (PF)  0 5 mL Infiltration Once PRN Christian Young MD     • ondansetron  4 mg Intravenous Q6H PRN Christian Young MD     • oxyCODONE  5 mg Oral Q4H PRN Christian Young MD     • oxyCODONE  2 5 mg Oral Q4H PRN Christian Young MD     • pantoprazole  40 mg Intravenous Q24H [address] Jodi Méndez MD       Continuous Infusions:dextrose 5 % and sodium chloride 0 45 % with KCl 20 mEq/L, 75 mL/hr      PRN Meds: •  diphenhydrAMINE  •  labetalol  •  lidocaine (PF)  •  ondansetron  •  oxyCODONE  •  oxyCODONE      Invasive Devices     Peripheral Intravenous Line  Duration           Peripheral IV 06/23/23 Left Hand 1 day    Peripheral IV 06/23/23 Right Hand 1 day          Drain  Duration           Urethral Catheter Latex 16 Fr  1 day                Lab, Imaging and other studies: I have personally reviewed pertinent reports      VTE Pharmacologic Prophylaxis: Sequential compression device (Venodyne)   VTE Mechanical Prophylaxis: sequential compression device

## 2023-06-23 NOTE — ANESTHESIA POSTPROCEDURE EVALUATION
Post-Op Assessment Note    CV Status:  Stable  Pain Score: 0    Pain management: adequate     Mental Status:  Sleepy and awake   Hydration Status:  Stable   PONV Controlled:  Controlled   Airway Patency:  Patent   Two or more mitigation strategies used for obstructive sleep apnea   Post Op Vitals Reviewed: Yes      Staff: Anesthesiologist, CRNA         No notable events documented      /67 (06/23/23 1209)    Temp 97 5 °F (36 4 °C) (06/23/23 1209)    Pulse 68 (06/23/23 1209)   Resp 14 (06/23/23 1209)    SpO2 100 % (06/23/23 1209)

## 2023-06-23 NOTE — OP NOTE
OPERATIVE REPORT  PATIENT NAME: Hood Barton    :  1970  MRN: [id number]  Pt Location: BE OR ROOM 14    SURGERY DATE: 2023    Surgeon(s) and Role:     * Corey Lopes MD - Primary     * TAYLER Medina-C - 1 Poncho Bhatt MD - Assisting    Preop Diagnosis:  Colon adenocarcinoma ([address] ) [C18 9]    Post-Op Diagnosis Codes:     * Colon adenocarcinoma ([address] ) [C18 9]    Procedure(s):  LAPAROSCOPIC SIGMOID COLECTOMY WITH ROBOTICS  RIGID PROCTOSCOPY    Specimen(s):  ID Type Source Tests Collected by Time Destination   1 :  Tissue Large Intestine, Sigmoid Colon TISSUE EXAM Corey Lopes MD 2023 1111        Estimated Blood Loss:   Minimal    Drains:  Urethral Catheter Latex 16 Fr  (Active)   Site Assessment Clean;Skin intact 23 1400   Collection Container Standard drainage bag 23 1500   Securement Method Securing device (Describe) 23 1500   Number of days: 0       Anesthesia Type:   General    Operative Indications:  Colon adenocarcinoma ([address] ) [C18 9]    Sigmoid adenocarcinoma detected on colonoscopy    Operative Findings:  Tattoo and predicted location; uneventful sigmoid colectomy with negative leak test    Complications:   None    Procedure and Technique:  The patient was identified in the operating suite and general endotracheal intubation achieved  A Reyes was placed and the legs were placed in stirrups in the event that the tumor was lower than anticipated  The right arm was padded and tucked  The abdomen and rectum were prepped and draped in the usual sterile fashion  A timeout was performed  Using Modi's point, stab incision was made and a 5 mm trocar advanced under direct visualization  Upon entry to the abdomen, a small injury to the liver was noted, which was controlled with electrocautery and Surgicel  3 additional ports were placed from the right lower quadrant to the left upper quadrant, two 8 mm ports and a 12 mm port    The 5 mm port was exchanged for a 8 mm robotic port  Finally, a right upper quadrant 5 mm AirSeal was placed  The small bowel was swept into the right upper quadrant, and the tattoo visualized at the mid sigmoid colon  There was significant redundancy of the descending colon, such that mobilization of the splenic flexure did not appear to be necessary  The patient was positioned in slight Trendelenburg with the right side down, and the robot was docked  Dissection began at the takeoff of the ROGELIO  The peritoneum overlying the ROGELIO was carefully incised, and the retroperitoneal structures swept inferiorly  The dissection continued in the medial to lateral fashion, ensuring that the ureter was swept into the retroperitoneum and the retroperitoneal plane was not violated  The dissection continued out to the abdominal sidewall, completely isolating the ROGELIO pedicle  This was then divided with double cautery on the stay side and sharp division on the specimen side  The dissection continued inferiorly and superiorly, noting the site of the tattoo, which was placed just distal to the tumor  It appeared that a resection 5 cm distal to this was possible, terminating at the proximal rectum and the pelvic brim  We then mobilized the white line of Toldt, medializing the colon and maintaining the retroperitoneal structures  The lateral dissection at the medial dissection, and was carried to its necessary superior and inferior extent  The mesenteric division was then continued inferiorly from the divided pedicle, again ensuring that the retroperitoneal structures remained posterior, carefully visualizing the ureter  The mesentery was then divided to the distal transection point, which was divided with a single fire of the 60 mm sure form blue load of the stapler  We continued the dissection proximally, noting the takeoff of the left colon and the branch leading more distally    The mesentery was thus divided, maintaining the left colic, and dividing the branch just proximal to the tumor  This allowed for at least 5 cm of colon proximal to the tumor  The proximal transection margin was then divided with the sure form stapler as well, and the specimen was completely freed  At this juncture, the descending colon was mobilized into the pelvis, ensuring that a tension-free anastomosis was possible  This required relaxation of the peritoneum over the mesentery, but did not require division of any mesentery or further mobilization of the lateral wall  3mL ICG was then injected followed by a 10 cc flush, which demonstrated excellent flow to both transected colon and the proximal rectum  A side-to-side anastomosis was then performed with a single fire of the 60 mm blue load, and the common enterotomy oversewn with a running 3-0 Vicryl V-Loc in 2 layers  Finally, the pelvis was filled with saline and a leak test performed with rigid proctoscopy x2, revealing no evidence of leak  The surgical site was then copiously irrigated and found to be hemostatic  The liver was further inspected, and hemostasis again confirmed  The robot was then undocked  The 12 mm port site was extended 2 cm, and the anterior fascia divided carefully  The rectus was retracted to expose the midline, and the posterior fascia opened along the length of the incision  Wound protector was then placed, and the specimen delivered in removed off the field  The posterior fascia was then closed with interrupted 0 Vicryl suture, and the anterior fascia with interrupted #1 PDS  The site was copiously irrigated  The skin at this site was then reapproximated at 3-0 Vicryl interrupted for the dermis  The skin of all port sites was closed with 4-0 Monocryl followed by skin glue  The patient tolerated the procedure well  I was present for the entire procedure   and A physician assistant was required during the procedure for retraction, tissue handling, dissection and suturing as well as expert bedside assist      Patient Disposition:  PACU     Colon Resection  Operation performed with curative intent Yes   Tumor Location (select all that apply) Sigmoid colon   Extent of colon and vascular resection (select all that apply) Sigmoid resection - inferior mesenteric          SIGNATURE: Mildred Marrufo MD  DATE: June 23, 2023  TIME: 3:22 PM

## 2023-06-23 NOTE — ANESTHESIA PREPROCEDURE EVALUATION
Procedure:  LAPAROSCOPIC SIGMOID COLECTOMY WITH ROBOTICS, FLEXIBLE PROCTOSCOPY (Abdomen)    Relevant Problems   GI/HEPATIC   (+) Colon adenocarcinoma (HCC)        Physical Exam    Airway    Mallampati score: III  TM Distance: <3 FB  Neck ROM: full     Dental       Cardiovascular  Rhythm: regular, Rate: normal, Cardiovascular exam normal    Pulmonary  Pulmonary exam normal Breath sounds clear to auscultation,     Other Findings        Anesthesia Plan  ASA Score- 2     Anesthesia Type- general with ASA Monitors  Additional Monitors:   Airway Plan: ETT  Comment: Risks/benefits and alternatives discussed including likely possibility of PONV and sore throat, as well as the rare possibilities of aspiration, dental/oropharyngeal/ocular injuries, or grave/life threatening anesthetic and surgical emergencies          Plan Factors-Exercise tolerance (METS): >4 METS  Patient summary reviewed  Patient instructed to abstain from smoking on day of procedure  Patient did not smoke on day of surgery  Induction- intravenous  Postoperative Plan- Plan for postoperative opioid use  Planned trial extubation    Informed Consent- Anesthetic plan and risks discussed with patient  I personally reviewed this patient with the CRNA  Discussed and agreed on the Anesthesia Plan with the CRNA  Baist Jyoti

## 2023-06-24 LAB
ANION GAP SERPL CALCULATED.3IONS-SCNC: 3 MMOL/L
BUN SERPL-MCNC: 10 MG/DL (ref 5–25)
CALCIUM SERPL-MCNC: 8.4 MG/DL (ref 8.3–10.1)
CHLORIDE SERPL-SCNC: 109 MMOL/L (ref 96–108)
CO2 SERPL-SCNC: 27 MMOL/L (ref 21–32)
CREAT SERPL-MCNC: 0.87 MG/DL (ref 0.6–1.3)
ERYTHROCYTE [DISTWIDTH] IN BLOOD BY AUTOMATED COUNT: 12.9 % (ref 11.6–15.1)
GFR SERPL CREATININE-BSD FRML MDRD: 76 ML/MIN/1.73SQ M
GLUCOSE SERPL-MCNC: 104 MG/DL (ref 65–140)
HCT VFR BLD AUTO: 35.7 % (ref 34.8–46.1)
HGB BLD-MCNC: 11.8 G/DL (ref 11.5–15.4)
MAGNESIUM SERPL-MCNC: 2 MG/DL (ref 1.6–2.6)
MCH RBC QN AUTO: 30.9 PG (ref 26.8–34.3)
MCHC RBC AUTO-ENTMCNC: 33.1 G/DL (ref 31.4–37.4)
MCV RBC AUTO: 94 FL (ref 82–98)
PHOSPHATE SERPL-MCNC: 3.4 MG/DL (ref 2.7–4.5)
PLATELET # BLD AUTO: 223 THOUSANDS/UL (ref 149–390)
PMV BLD AUTO: 11.2 FL (ref 8.9–12.7)
POTASSIUM SERPL-SCNC: 4 MMOL/L (ref 3.5–5.3)
RBC # BLD AUTO: 3.82 MILLION/UL (ref 3.81–5.12)
SODIUM SERPL-SCNC: 139 MMOL/L (ref 135–147)
WBC # BLD AUTO: 10.96 THOUSAND/UL (ref 4.31–10.16)

## 2023-06-24 PROCEDURE — 99024 POSTOP FOLLOW-UP VISIT: CPT | Performed by: STUDENT IN AN ORGANIZED HEALTH CARE EDUCATION/TRAINING PROGRAM

## 2023-06-24 PROCEDURE — 85027 COMPLETE CBC AUTOMATED: CPT | Performed by: SURGERY

## 2023-06-24 PROCEDURE — 83735 ASSAY OF MAGNESIUM: CPT | Performed by: SURGERY

## 2023-06-24 PROCEDURE — C9113 INJ PANTOPRAZOLE SODIUM, VIA: HCPCS | Performed by: SURGERY

## 2023-06-24 PROCEDURE — 80048 BASIC METABOLIC PNL TOTAL CA: CPT | Performed by: SURGERY

## 2023-06-24 PROCEDURE — 84100 ASSAY OF PHOSPHORUS: CPT | Performed by: SURGERY

## 2023-06-24 RX ORDER — DEXTROSE MONOHYDRATE, SODIUM CHLORIDE, AND POTASSIUM CHLORIDE 50; 1.49; 4.5 G/1000ML; G/1000ML; G/1000ML
75 INJECTION, SOLUTION INTRAVENOUS CONTINUOUS
Status: DISCONTINUED | OUTPATIENT
Start: 2023-06-24 | End: 2023-06-24

## 2023-06-24 RX ORDER — DEXTROSE MONOHYDRATE, SODIUM CHLORIDE, AND POTASSIUM CHLORIDE 50; 1.49; 4.5 G/1000ML; G/1000ML; G/1000ML
50 INJECTION, SOLUTION INTRAVENOUS CONTINUOUS
Status: DISCONTINUED | OUTPATIENT
Start: 2023-06-24 | End: 2023-06-25

## 2023-06-24 RX ORDER — OXYCODONE HYDROCHLORIDE 5 MG/1
5 TABLET ORAL EVERY 4 HOURS PRN
Status: DISCONTINUED | OUTPATIENT
Start: 2023-06-24 | End: 2023-06-26 | Stop reason: HOSPADM

## 2023-06-24 RX ADMIN — DEXTROSE, SODIUM CHLORIDE, AND POTASSIUM CHLORIDE 75 ML/HR: 5; .45; .15 INJECTION INTRAVENOUS at 09:23

## 2023-06-24 RX ADMIN — GABAPENTIN 300 MG: 300 CAPSULE ORAL at 22:11

## 2023-06-24 RX ADMIN — Medication 2.5 MG: at 16:58

## 2023-06-24 RX ADMIN — PANTOPRAZOLE SODIUM 40 MG: 40 INJECTION, POWDER, FOR SOLUTION INTRAVENOUS at 09:43

## 2023-06-24 RX ADMIN — GABAPENTIN 300 MG: 300 CAPSULE ORAL at 09:43

## 2023-06-24 RX ADMIN — CELECOXIB 200 MG: 200 CAPSULE ORAL at 17:45

## 2023-06-24 RX ADMIN — ACETAMINOPHEN 975 MG: 325 TABLET ORAL at 17:45

## 2023-06-24 RX ADMIN — CELECOXIB 200 MG: 200 CAPSULE ORAL at 09:42

## 2023-06-24 RX ADMIN — ACETAMINOPHEN 975 MG: 325 TABLET ORAL at 12:18

## 2023-06-24 RX ADMIN — GABAPENTIN 300 MG: 300 CAPSULE ORAL at 16:58

## 2023-06-24 NOTE — PLAN OF CARE
Problem: PAIN - ADULT  Goal: Verbalizes/displays adequate comfort level or baseline comfort level  Description: Interventions:  - Encourage patient to monitor pain and request assistance  - Assess pain using appropriate pain scale  - Administer analgesics based on type and severity of pain and evaluate response  - Implement non-pharmacological measures as appropriate and evaluate response  - Consider cultural and social influences on pain and pain management  - Notify physician/advanced practitioner if interventions unsuccessful or patient reports new pain  Outcome: Progressing     Problem: INFECTION - ADULT  Goal: Absence or prevention of progression during hospitalization  Description: INTERVENTIONS:  - Assess and monitor for signs and symptoms of infection  - Monitor lab/diagnostic results  - Monitor all insertion sites, i e  indwelling lines, tubes, and drains  - Monitor endotracheal if appropriate and nasal secretions for changes in amount and color  - [place] appropriate cooling/warming therapies per order  - Administer medications as ordered  - Instruct and encourage patient and family to use good hand hygiene technique  - Identify and instruct in appropriate isolation precautions for identified infection/condition  Outcome: Progressing  Goal: Absence of fever/infection during neutropenic period  Description: INTERVENTIONS:  - Monitor WBC    Outcome: Progressing     Problem: DISCHARGE PLANNING  Goal: Discharge to home or other facility with appropriate resources  Description: INTERVENTIONS:  - Identify barriers to discharge w/patient and caregiver  - Arrange for needed discharge resources and transportation as appropriate  - Identify discharge learning needs (meds, wound care, etc )  - Arrange for interpretive services to assist at discharge as needed  - Refer to Case Management Department for coordinating discharge planning if the patient needs post-hospital services based on physician/advanced practitioner order or complex needs related to functional status, cognitive ability, or social support system  Outcome: Progressing

## 2023-06-24 NOTE — PLAN OF CARE
Problem: PAIN - ADULT  Goal: Verbalizes/displays adequate comfort level or baseline comfort level  Description: Interventions:  - Encourage patient to monitor pain and request assistance  - Assess pain using appropriate pain scale  - Administer analgesics based on type and severity of pain and evaluate response  - Implement non-pharmacological measures as appropriate and evaluate response  - Consider cultural and social influences on pain and pain management  - Notify physician/advanced practitioner if interventions unsuccessful or patient reports new pain  Outcome: Progressing     Problem: INFECTION - ADULT  Goal: Absence or prevention of progression during hospitalization  Description: INTERVENTIONS:  - Assess and monitor for signs and symptoms of infection  - Monitor lab/diagnostic results  - Monitor all insertion sites, i e  indwelling lines, tubes, and drains  - Monitor endotracheal if appropriate and nasal secretions for changes in amount and color  - [place] appropriate cooling/warming therapies per order  - Administer medications as ordered  - Instruct and encourage patient and family to use good hand hygiene technique  - Identify and instruct in appropriate isolation precautions for identified infection/condition  Outcome: Progressing  Goal: Absence of fever/infection during neutropenic period  Description: INTERVENTIONS:  - Monitor WBC    Outcome: Progressing     Problem: SAFETY ADULT  Goal: Patient will remain free of falls  Description: INTERVENTIONS:  - Educate patient/family on patient safety including physical limitations  - Instruct patient to call for assistance with activity   - Consult OT/PT to assist with strengthening/mobility   - Keep Call bell within reach  - Keep bed low and locked with side rails adjusted as appropriate  - Keep care items and personal belongings within reach  - Initiate and maintain comfort rounds  - Make Fall Risk Sign visible to staff  - Apply yellow socks and bracelet for high fall risk patients  - Consider moving patient to room near nurses station  Outcome: Progressing  Goal: Maintain or return to baseline ADL function  Description: INTERVENTIONS:  -  Assess patient's ability to carry out ADLs; assess patient's baseline for ADL function and identify physical deficits which impact ability to perform ADLs (bathing, care of mouth/teeth, toileting, grooming, dressing, etc )  - Assess/evaluate cause of self-care deficits   - Assess range of motion  - Assess patient's mobility; develop plan if impaired  - Assess patient's need for assistive devices and provide as appropriate  - Encourage maximum independence but intervene and supervise when necessary  - Involve family in performance of ADLs  - Assess for home care needs following discharge   - Consider OT consult to assist with ADL evaluation and planning for discharge  - Provide patient education as appropriate  Outcome: Progressing  Goal: Maintains/Returns to pre admission functional level  Description: INTERVENTIONS:  - Perform BMAT or MOVE assessment daily    - Set and communicate daily mobility goal to care team and patient/family/caregiver     - Collaborate with rehabilitation services on mobility goals if consulted  - Record patient progress and toleration of activity level   Outcome: Progressing

## 2023-06-24 NOTE — UTILIZATION REVIEW
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  No flatus  No bowel mvt  Pain well controlled  Denied cp or sob  Denied fever or chills  Plan d/c PCS pump ambulate clears with toast and crackers,Continue multimodal analgesia  Celebrex, rachid, tylenol, abd s/ nt/ nd Incision c/d/i        Admission Orders: Date/Time/Statement:   Admission Orders (From admission, onward)     Ordered        06/23/23 1230  Inpatient Admission  Once                      Orders Placed This Encounter   Procedures   • Inpatient Admission     Standing Status:   Standing     Number of Occurrences:   1     Order Specific Question:   Level of Care     Answer:   Med Surg [16]     Order Specific Question:   Estimated length of stay     Answer:   More than 2 Midnights     Order Specific Question:   Certification     Answer:   I certify that inpatient services are medically necessary for this patient for a duration of greater than two midnights  See H&P and MD Progress Notes for additional information about the patient's course of treatment       Vital Signs: /73   Pulse 71   Temp 97 7 °F (36 5 °C)   Resp 17   SpO2 94%     Pertinent Labs/Diagnostic Test Results:     No orders to display         Results from last 7 days   Lab Units 06/24/23  0603   WBC Thousand/uL 10 96*   HEMOGLOBIN g/dL 11 8   HEMATOCRIT % 35 7   PLATELETS Thousands/uL 223         Results from last 7 days   Lab Units 06/24/23  0603   SODIUM mmol/L 139   POTASSIUM mmol/L 4 0   CHLORIDE mmol/L 109*   CO2 mmol/L 27   ANION GAP mmol/L 3   BUN mg/dL 10   CREATININE mg/dL 0 87   EGFR ml/min/1 73sq m 76   CALCIUM mg/dL 8 4 MAGNESIUM mg/dL 2 0   PHOSPHORUS mg/dL 3 4         Results from last 7 days   Lab Units 06/23/23  1220   POC GLUCOSE mg/dl 160*     Results from last 7 days   Lab Units 06/24/23  0603   GLUCOSE RANDOM mg/dL 104       Diet: clears with toast and crackers  Mobility: ambulate  DVT Prophylaxis: SCD heparin    Medications/Pain Control:   Scheduled Medications:  acetaminophen, 975 mg, Oral, Q6H LUIZ  cefazolin, 2,000 mg, Intravenous, Once  celecoxib, 200 mg, Oral, BID  gabapentin, 300 mg, Oral, TID  heparin (porcine), 5,000 Units, Subcutaneous, Q8H LUIZ  pantoprazole, 40 mg, Intravenous, Q24H [facility]      Continuous IV Infusions:  dextrose 5 % and sodium chloride 0 45 % with KCl 20 mEq/L, 75 mL/hr, Intravenous, Continuous  IV HYDROmorphone (DILAUDID) 1 mg/mL 50 mL PCA    PRN Meds:  diphenhydrAMINE, 25 mg, Intravenous, Q6H PRN  labetalol, 10 mg, Intravenous, Q4H PRN  lidocaine (PF), 0 5 mL, Infiltration, Once PRN  ondansetron, 4 mg, Intravenous, Q6H PRN  oxyCODONE, 5 mg, Oral, Q4H PRN  oxyCODONE, 2 5 mg, Oral, Q4H PRN        Network Utilization Review Department  ATTENTION: Please call with any questions or concerns to [phone] and carefully listen to the prompts so that you are directed to the right person  All voicemails are confidential   Brannon Walker all requests for admission clinical reviews, approved or denied determinations and any other requests to dedicated fax number below belonging to the campus where the patient is receiving treatment   List of dedicated fax numbers for the Facilities:  [address] DENIALS (Administrative/Medical Necessity) [phone]   [address] (Maternity/NICU/Pediatrics) [address] [place] [address] [phone]   [address] [address] Medical [place] [phone]   [facility] [address] [address] Satish Banks [address] [facility] [place] [address] [facility] [phone]

## 2023-06-25 LAB
ANION GAP SERPL CALCULATED.3IONS-SCNC: 0 MMOL/L
BASOPHILS # BLD AUTO: 0.02 THOUSANDS/ÂΜL (ref 0–0.1)
BASOPHILS NFR BLD AUTO: 0 % (ref 0–1)
BUN SERPL-MCNC: 8 MG/DL (ref 5–25)
CALCIUM SERPL-MCNC: 8.3 MG/DL (ref 8.3–10.1)
CHLORIDE SERPL-SCNC: 111 MMOL/L (ref 96–108)
CO2 SERPL-SCNC: 29 MMOL/L (ref 21–32)
CREAT SERPL-MCNC: 0.88 MG/DL (ref 0.6–1.3)
EOSINOPHIL # BLD AUTO: 0.12 THOUSAND/ÂΜL (ref 0–0.61)
EOSINOPHIL NFR BLD AUTO: 2 % (ref 0–6)
ERYTHROCYTE [DISTWIDTH] IN BLOOD BY AUTOMATED COUNT: 13 % (ref 11.6–15.1)
GFR SERPL CREATININE-BSD FRML MDRD: 75 ML/MIN/1.73SQ M
GLUCOSE SERPL-MCNC: 105 MG/DL (ref 65–140)
HCT VFR BLD AUTO: 33.9 % (ref 34.8–46.1)
HGB BLD-MCNC: 10.8 G/DL (ref 11.5–15.4)
IMM GRANULOCYTES # BLD AUTO: 0.02 THOUSAND/UL (ref 0–0.2)
IMM GRANULOCYTES NFR BLD AUTO: 0 % (ref 0–2)
LYMPHOCYTES # BLD AUTO: 2.08 THOUSANDS/ÂΜL (ref 0.6–4.47)
LYMPHOCYTES NFR BLD AUTO: 33 % (ref 14–44)
MCH RBC QN AUTO: 30.8 PG (ref 26.8–34.3)
MCHC RBC AUTO-ENTMCNC: 31.9 G/DL (ref 31.4–37.4)
MCV RBC AUTO: 97 FL (ref 82–98)
MONOCYTES # BLD AUTO: 0.5 THOUSAND/ÂΜL (ref 0.17–1.22)
MONOCYTES NFR BLD AUTO: 8 % (ref 4–12)
NEUTROPHILS # BLD AUTO: 3.61 THOUSANDS/ÂΜL (ref 1.85–7.62)
NEUTS SEG NFR BLD AUTO: 57 % (ref 43–75)
NRBC BLD AUTO-RTO: 0 /100 WBCS
PLATELET # BLD AUTO: 185 THOUSANDS/UL (ref 149–390)
PMV BLD AUTO: 11.6 FL (ref 8.9–12.7)
POTASSIUM SERPL-SCNC: 3.7 MMOL/L (ref 3.5–5.3)
RBC # BLD AUTO: 3.51 MILLION/UL (ref 3.81–5.12)
SODIUM SERPL-SCNC: 140 MMOL/L (ref 135–147)
WBC # BLD AUTO: 6.35 THOUSAND/UL (ref 4.31–10.16)

## 2023-06-25 PROCEDURE — 97162 PT EVAL MOD COMPLEX 30 MIN: CPT

## 2023-06-25 PROCEDURE — 85025 COMPLETE CBC W/AUTO DIFF WBC: CPT | Performed by: SURGERY

## 2023-06-25 PROCEDURE — 99024 POSTOP FOLLOW-UP VISIT: CPT | Performed by: STUDENT IN AN ORGANIZED HEALTH CARE EDUCATION/TRAINING PROGRAM

## 2023-06-25 PROCEDURE — 80048 BASIC METABOLIC PNL TOTAL CA: CPT | Performed by: SURGERY

## 2023-06-25 PROCEDURE — C9113 INJ PANTOPRAZOLE SODIUM, VIA: HCPCS | Performed by: SURGERY

## 2023-06-25 RX ORDER — POTASSIUM CHLORIDE 20 MEQ/1
20 TABLET, EXTENDED RELEASE ORAL ONCE
Status: COMPLETED | OUTPATIENT
Start: 2023-06-25 | End: 2023-06-25

## 2023-06-25 RX ADMIN — PANTOPRAZOLE SODIUM 40 MG: 40 INJECTION, POWDER, FOR SOLUTION INTRAVENOUS at 08:44

## 2023-06-25 RX ADMIN — GABAPENTIN 300 MG: 300 CAPSULE ORAL at 08:43

## 2023-06-25 RX ADMIN — CELECOXIB 200 MG: 200 CAPSULE ORAL at 16:51

## 2023-06-25 RX ADMIN — Medication 2.5 MG: at 08:43

## 2023-06-25 RX ADMIN — ACETAMINOPHEN 975 MG: 325 TABLET ORAL at 05:13

## 2023-06-25 RX ADMIN — ACETAMINOPHEN 975 MG: 325 TABLET ORAL at 16:50

## 2023-06-25 RX ADMIN — ACETAMINOPHEN 975 MG: 325 TABLET ORAL at 11:52

## 2023-06-25 RX ADMIN — CELECOXIB 200 MG: 200 CAPSULE ORAL at 08:43

## 2023-06-25 RX ADMIN — GABAPENTIN 300 MG: 300 CAPSULE ORAL at 21:48

## 2023-06-25 RX ADMIN — POTASSIUM CHLORIDE 20 MEQ: 1500 TABLET, EXTENDED RELEASE ORAL at 08:43

## 2023-06-25 RX ADMIN — GABAPENTIN 300 MG: 300 CAPSULE ORAL at 16:50

## 2023-06-25 NOTE — PROGRESS NOTES
Progress Note - Chris Skaggs 47 y o  female MRN: [id number]    Unit/Bed#: [facility] 830-01 Encounter: [id number]      Assessment:  48 y/o F w sigmoid neoplasm s/p robotic sigmoidectomy on 6/23  Vss  Afebrile  abd s/ nt/ nd  + flatus  No bowel mvt yet  Plan:  Low residue diet  Ambulate  dvt ppx  One dose of miralax this morning  Discharge today pending bowel mvt  Subjective:   Feels great  No complaints  Passing flatus  Ambulating  Voiding w/o issues  No bowel mvt yet  Denied fever, chills, chest pain, shortness of breath, nausea, vomiting, or abdominal pain this morning  Objective:     Vitals: Blood pressure 154/100, pulse 61, temperature 98 6 °F (37 °C), resp  rate 16, SpO2 98 %, not currently breastfeeding  ,There is no height or weight on file to calculate BMI  Intake/Output Summary (Last 24 hours) at 6/25/2023 1754  Last data filed at 6/25/2023 1300  Gross per 24 hour   Intake 580 ml   Output --   Net 580 ml       Physical Exam  General: NAD  HEENT: NC/AT  MMM  Cv: RRR     Lungs: normal effort  Ab: Soft, NT/ND  Ex: no CCE  Neuro: AAOx3    Scheduled Meds:  Current Facility-Administered Medications   Medication Dose Route Frequency Provider Last Rate   • acetaminophen  975 mg Oral Q6H Rikki Enriquez MD     • cefazolin  2,000 mg Intravenous Once Evan Smith MD     • celecoxib  200 mg Oral BID Evan Smith MD     • diphenhydrAMINE  25 mg Intravenous Q6H PRN Evan Smith MD     • gabapentin  300 mg Oral TID Evan Smith MD     • heparin (porcine)  5,000 Units Subcutaneous [facility] Evan Smith MD     • labetalol  10 mg Intravenous Q4H PRN Evan Smith MD     • lidocaine (PF)  0 5 mL Infiltration Once PRN Evan Smith MD     • ondansetron  4 mg Intravenous Q6H PRN Evan Smith MD     • oxyCODONE  5 mg Oral Q4H PRN Evan Smith MD     • oxyCODONE  2 5 mg Oral Q4H PRN Evan Smith MD     • pantoprazole  40 mg Intravenous Q24H [address] Evan Smith MD       Continuous Infusions:   PRN Meds: •  diphenhydrAMINE  •  labetalol  •  lidocaine (PF)  •  ondansetron  •  oxyCODONE  •  oxyCODONE      Invasive Devices     Peripheral Intravenous Line  Duration           Peripheral IV 06/23/23 Left Hand 2 days    Peripheral IV 06/23/23 Right Hand 2 days                Lab, Imaging and other studies: I have personally reviewed pertinent reports      VTE Pharmacologic Prophylaxis: Sequential compression device (Venodyne)   VTE Mechanical Prophylaxis: sequential compression device

## 2023-06-25 NOTE — PLAN OF CARE
Problem: PAIN - ADULT  Goal: Verbalizes/displays adequate comfort level or baseline comfort level  Description: Interventions:  - Encourage patient to monitor pain and request assistance  - Assess pain using appropriate pain scale  - Administer analgesics based on type and severity of pain and evaluate response  - Implement non-pharmacological measures as appropriate and evaluate response  - Consider cultural and social influences on pain and pain management  - Notify physician/advanced practitioner if interventions unsuccessful or patient reports new pain  Outcome: Progressing     Problem: INFECTION - ADULT  Goal: Absence or prevention of progression during hospitalization  Description: INTERVENTIONS:  - Assess and monitor for signs and symptoms of infection  - Monitor lab/diagnostic results  - Monitor all insertion sites, i e  indwelling lines, tubes, and drains  - Monitor endotracheal if appropriate and nasal secretions for changes in amount and color  - [place] appropriate cooling/warming therapies per order  - Administer medications as ordered  - Instruct and encourage patient and family to use good hand hygiene technique  - Identify and instruct in appropriate isolation precautions for identified infection/condition  Outcome: Progressing  Goal: Absence of fever/infection during neutropenic period  Description: INTERVENTIONS:  - Monitor WBC    Outcome: Progressing     Problem: SAFETY ADULT  Goal: Patient will remain free of falls  Description: INTERVENTIONS:  - Educate patient/family on patient safety including physical limitations  - Instruct patient to call for assistance with activity   - Consult OT/PT to assist with strengthening/mobility   - Keep Call bell within reach  - Keep bed low and locked with side rails adjusted as appropriate  - Keep care items and personal belongings within reach  - Initiate and maintain comfort rounds  - Make Fall Risk Sign visible to staff    -- Apply yellow socks and bracelet for high fall risk patients  - Consider moving patient to room near nurses station  Outcome: Progressing  Goal: Maintain or return to baseline ADL function  Description: INTERVENTIONS:  -  Assess patient's ability to carry out ADLs; assess patient's baseline for ADL function and identify physical deficits which impact ability to perform ADLs (bathing, care of mouth/teeth, toileting, grooming, dressing, etc )  - Assess/evaluate cause of self-care deficits   - Assess range of motion  - Assess patient's mobility; develop plan if impaired  - Assess patient's need for assistive devices and provide as appropriate  - Encourage maximum independence but intervene and supervise when necessary  - Involve family in performance of ADLs  - Assess for home care needs following discharge   - Consider OT consult to assist with ADL evaluation and planning for discharge  - Provide patient education as appropriate  Outcome: Progressing  Goal: Maintains/Returns to pre admission functional level  Description: INTERVENTIONS:  - Perform BMAT or MOVE assessment daily    - Set and communicate daily mobility goal to care team and patient/family/caregiver     - Out of bed for toileting  - Record patient progress and toleration of activity level   Outcome: Progressing

## 2023-06-25 NOTE — PLAN OF CARE
Problem: PHYSICAL THERAPY ADULT  Goal: Performs mobility at highest level of function for planned discharge setting  See evaluation for individualized goals  Description:            See flowsheet documentation for full assessment, interventions and recommendations  Outcome: Completed  Note: Prognosis: Excellent     Assessment: Pt seen for PT evaluation  Pt with active PT eval/treat orders  Pt is a 47 y o  female who was admitted to [facility] on 6/23/23  Pt's current dx/ problem list include: colon adenocarcinoma s/p robotic sigmoidectomy  Comorbidities affecting pt's physical performance at time of assessment are as follows:  has a past medical history of Abnormal Pap smear of cervix, Sinus congestion, and Varicella  Personal factors affecting pt at time of initial examination include: steps to enter environment, limited home support, past experience, inability to perform IADLs, inability to perform ADLs  Due to acute medical issues, ongoing medical workup for primary dx; decreased activity tolerance compared to baseline, increased assistance needed from caregiver at current time, multiple lines, decline in overall functional mobility status; health management issues  At baseline, pt resides with her spouse in a 2  with 5 DIVINE and was independent with ADLs/ IADLs  Currently, upon initial examination, pt is demonstrating independence with all mobility tasks  No further acute PT needs  Pt would benefit from continued mobilization with restorative staff and nursing  The patient's AM-PAC Basic Mobility Inpatient Short Form Raw Score is 24  A Raw score of greater than 16 suggests the patient may benefit from discharge to home  Please also refer to the recommendation of the Physical Therapist for safe discharge planning  Barriers to Discharge: None     PT Discharge Recommendation: No rehabilitation needs    See flowsheet documentation for full assessment

## 2023-06-25 NOTE — PLAN OF CARE
Problem: PAIN - ADULT  Goal: Verbalizes/displays adequate comfort level or baseline comfort level  Description: Interventions:  - Encourage patient to monitor pain and request assistance  - Assess pain using appropriate pain scale  - Administer analgesics based on type and severity of pain and evaluate response  - Implement non-pharmacological measures as appropriate and evaluate response  - Consider cultural and social influences on pain and pain management  - Notify physician/advanced practitioner if interventions unsuccessful or patient reports new pain  Outcome: Progressing     Problem: INFECTION - ADULT  Goal: Absence or prevention of progression during hospitalization  Description: INTERVENTIONS:  - Assess and monitor for signs and symptoms of infection  - Monitor lab/diagnostic results  - Monitor all insertion sites, i e  indwelling lines, tubes, and drains  - Monitor endotracheal if appropriate and nasal secretions for changes in amount and color  - [place] appropriate cooling/warming therapies per order  - Administer medications as ordered  - Instruct and encourage patient and family to use good hand hygiene technique  - Identify and instruct in appropriate isolation precautions for identified infection/condition  Outcome: Progressing  Goal: Absence of fever/infection during neutropenic period  Description: INTERVENTIONS:  - Monitor WBC    Outcome: Progressing

## 2023-06-25 NOTE — PLAN OF CARE
Problem: PAIN - ADULT  Goal: Verbalizes/displays adequate comfort level or baseline comfort level  Description: Interventions:  - Encourage patient to monitor pain and request assistance  - Assess pain using appropriate pain scale  - Administer analgesics based on type and severity of pain and evaluate response  - Implement non-pharmacological measures as appropriate and evaluate response  - Consider cultural and social influences on pain and pain management  - Notify physician/advanced practitioner if interventions unsuccessful or patient reports new pain  Outcome: Progressing     Problem: INFECTION - ADULT  Goal: Absence or prevention of progression during hospitalization  Description: INTERVENTIONS:  - Assess and monitor for signs and symptoms of infection  - Monitor lab/diagnostic results  - Monitor all insertion sites, i e  indwelling lines, tubes, and drains  - Monitor endotracheal if appropriate and nasal secretions for changes in amount and color  - [place] appropriate cooling/warming therapies per order  - Administer medications as ordered  - Instruct and encourage patient and family to use good hand hygiene technique  - Identify and instruct in appropriate isolation precautions for identified infection/condition  Outcome: Progressing  Goal: Absence of fever/infection during neutropenic period  Description: INTERVENTIONS:  - Monitor WBC    Outcome: Progressing     Problem: SAFETY ADULT  Goal: Patient will remain free of falls  Description: INTERVENTIONS:  - Educate patient/family on patient safety including physical limitations  - Instruct patient to call for assistance with activity   - Consult OT/PT to assist with strengthening/mobility   - Keep Call bell within reach  - Keep bed low and locked with side rails adjusted as appropriate  - Keep care items and personal belongings within reach  - Initiate and maintain comfort rounds  - Make Fall Risk Sign visible to staff  - Apply yellow socks and bracelet for high fall risk patients  - Consider moving patient to room near nurses station  Outcome: Progressing  Goal: Maintain or return to baseline ADL function  Description: INTERVENTIONS:  -  Assess patient's ability to carry out ADLs; assess patient's baseline for ADL function and identify physical deficits which impact ability to perform ADLs (bathing, care of mouth/teeth, toileting, grooming, dressing, etc )  - Assess/evaluate cause of self-care deficits   - Assess range of motion  - Assess patient's mobility; develop plan if impaired  - Assess patient's need for assistive devices and provide as appropriate  - Encourage maximum independence but intervene and supervise when necessary  - Involve family in performance of ADLs  - Assess for home care needs following discharge   - Consider OT consult to assist with ADL evaluation and planning for discharge  - Provide patient education as appropriate  Outcome: Progressing  Goal: Maintains/Returns to pre admission functional level  Description: INTERVENTIONS:  - Perform BMAT or MOVE assessment daily    - Set and communicate daily mobility goal to care team and patient/family/caregiver     - Collaborate with rehabilitation services on mobility goals if consulted  - Out of bed for toileting  - Record patient progress and toleration of activity level   Outcome: Progressing     Problem: DISCHARGE PLANNING  Goal: Discharge to home or other facility with appropriate resources  Description: INTERVENTIONS:  - Identify barriers to discharge w/patient and caregiver  - Arrange for needed discharge resources and transportation as appropriate  - Identify discharge learning needs (meds, wound care, etc )  - Arrange for interpretive services to assist at discharge as needed  - Refer to Case Management Department for coordinating discharge planning if the patient needs post-hospital services based on physician/advanced practitioner order or complex needs related to functional status, cognitive ability, or social support system  Outcome: Progressing     Problem: Knowledge Deficit  Goal: Patient/family/caregiver demonstrates understanding of disease process, treatment plan, medications, and discharge instructions  Description: Complete learning assessment and assess knowledge base    Interventions:  - Provide teaching at level of understanding  - Provide teaching via preferred learning methods  Outcome: Progressing

## 2023-06-25 NOTE — PHYSICAL THERAPY NOTE
Physical Therapy Evaluation     Patient's Name: Danay Smith    Admitting Diagnosis  Colon adenocarcinoma [facility]) [C18 9]    Problem List  Patient Active Problem List   Diagnosis    Colon adenocarcinoma [facility])       Past Medical History  Past Medical History:   Diagnosis Date    Abnormal Pap smear of cervix     Sinus congestion     chronic    Varicella        Past Surgical History  Past Surgical History:   Procedure Laterality Date    BREAST EXCISIONAL BIOPSY Right 2008    benign    FOOT NEUROMA SURGERY Left     FOOT SURGERY      US BREAST NEEDLE LOC RIGHT Right 10/08/2008          06/25/23 0937   PT Last Visit   PT Visit Date 06/25/23   Note Type   Note type Evaluation   Pain Assessment   Pain Assessment Tool 0-10   Pain Score No Pain   Restrictions/Precautions   Weight Bearing Precautions Per Order No   Other Precautions Multiple lines   Home Living   Type of [address] [place] [address] Multi-level;Stairs to enter with rails;Bed/bath upstairs   Bathroom Shower/Tub Tub/shower unit   Bathroom Toilet Standard   Bathroom Accessibility Accessible   Home Equipment Other (Comment)  (pt denies)   Additional Comments PTA pt denies the use of DME   Prior Function   Level of [place] Independent with ADLs; Independent with functional mobility; Independent with IADLS   Lives With Spouse   Receives Help From Family   IADLs Independent with driving; Independent with meal prep; Independent with medication management   Falls in the last 6 months 0   Vocational Retired   General   Family/Caregiver Present Yes  (pt's spouse)   Cognition   Overall Cognitive Status WFL   Arousal/Participation Cooperative   Orientation Level Oriented X4   Memory Within functional limits   Following Commands Follows all commands and directions without difficulty   RLE Assessment   RLE Assessment WFL   LLE Assessment   LLE Assessment WFL   Bed Mobility   Additional Comments pt greated EOB, returned to seated EOB at end of session   Transfers   Sit to Stand 7  Independent   Stand to Sit 7  Independent   Additional Comments no AD   Ambulation/Elevation   Gait pattern WNL; Step through pattern   Gait Assistance 7  Independent   Assistive Device None   Distance 300'   Stair Management Assistance 7  Independent   Stair Management Technique No rails; Alternating pattern   Number of Stairs 6   Balance   Static Sitting Normal   Dynamic Sitting Normal   Static Standing Normal   Dynamic Standing Good   Ambulatory Good   Activity Tolerance   Activity Tolerance Patient tolerated treatment well   Nurse Made Aware RN cleared and updated   Assessment   Prognosis Excellent   Assessment Pt seen for PT evaluation  Pt with active PT eval/treat orders  Pt is a 47 y o  female who was admitted to [facility] on 6/23/23  Pt's current dx/ problem list include: colon adenocarcinoma s/p robotic sigmoidectomy  Comorbidities affecting pt's physical performance at time of assessment are as follows:  has a past medical history of Abnormal Pap smear of cervix, Sinus congestion, and Varicella  Personal factors affecting pt at time of initial examination include: steps to enter environment, limited home support, past experience, inability to perform IADLs, inability to perform ADLs  Due to acute medical issues, ongoing medical workup for primary dx; decreased activity tolerance compared to baseline, increased assistance needed from caregiver at current time, multiple lines, decline in overall functional mobility status; health management issues  At baseline, pt resides with her spouse in a 2  with 5 DIVINE and was independent with ADLs/ IADLs  Currently, upon initial examination, pt is demonstrating independence with all mobility tasks  No further acute PT needs  Pt would benefit from continued mobilization with restorative staff and nursing  The patient's AM-PAC Basic Mobility Inpatient Short Form Raw Score is 24   A Raw score of greater than 16 suggests the patient may benefit from discharge to home  Please also refer to the recommendation of the Physical Therapist for safe discharge planning     Barriers to Discharge None   Goals   Patient Goals to go home   Plan   PT Frequency Other (Comment)  (PT EVAL ONLY)   Recommendation   PT Discharge Recommendation No rehabilitation needs   AM-PAC Basic Mobility Inpatient   Turning in Flat Bed Without Bedrails 4   Lying on Back to Sitting on Edge of Flat Bed Without Bedrails 4   Moving Bed to Chair 4   Standing Up From Chair Using Arms 4   Walk in Room 4   Climb 3-5 Stairs With Railing 4   Basic Mobility Inpatient Raw Score 24   Basic Mobility Standardized Score 57 68   Highest Level Of Mobility   JH-HLM Goal 8: Walk 250 feet or more   JH-HLM Achieved 8: Walk 250 feet ot more         Shabana Tate, PT

## 2023-06-26 VITALS
TEMPERATURE: 97.5 F | HEART RATE: 60 BPM | DIASTOLIC BLOOD PRESSURE: 90 MMHG | RESPIRATION RATE: 18 BRPM | OXYGEN SATURATION: 97 % | SYSTOLIC BLOOD PRESSURE: 156 MMHG

## 2023-06-26 LAB
ANION GAP SERPL CALCULATED.3IONS-SCNC: 0 MMOL/L
BUN SERPL-MCNC: 11 MG/DL (ref 5–25)
CALCIUM SERPL-MCNC: 9.3 MG/DL (ref 8.3–10.1)
CHLORIDE SERPL-SCNC: 108 MMOL/L (ref 96–108)
CO2 SERPL-SCNC: 30 MMOL/L (ref 21–32)
CREAT SERPL-MCNC: 1.03 MG/DL (ref 0.6–1.3)
GFR SERPL CREATININE-BSD FRML MDRD: 62 ML/MIN/1.73SQ M
GLUCOSE SERPL-MCNC: 105 MG/DL (ref 65–140)
POTASSIUM SERPL-SCNC: 4.2 MMOL/L (ref 3.5–5.3)
SODIUM SERPL-SCNC: 138 MMOL/L (ref 135–147)

## 2023-06-26 PROCEDURE — 80048 BASIC METABOLIC PNL TOTAL CA: CPT | Performed by: SURGERY

## 2023-06-26 PROCEDURE — C9113 INJ PANTOPRAZOLE SODIUM, VIA: HCPCS | Performed by: SURGERY

## 2023-06-26 RX ORDER — CELECOXIB 200 MG/1
200 CAPSULE ORAL DAILY
Qty: 7 CAPSULE | Refills: 0 | Status: SHIPPED | OUTPATIENT
Start: 2023-06-26 | End: 2023-07-03

## 2023-06-26 RX ORDER — OXYCODONE HYDROCHLORIDE 5 MG/1
5 TABLET ORAL EVERY 6 HOURS PRN
Qty: 16 TABLET | Refills: 0 | Status: SHIPPED | OUTPATIENT
Start: 2023-06-26 | End: 2023-06-30

## 2023-06-26 RX ORDER — ENOXAPARIN SODIUM 100 MG/ML
40 INJECTION SUBCUTANEOUS DAILY
Qty: 10 ML | Refills: 0 | Status: SHIPPED | OUTPATIENT
Start: 2023-06-26 | End: 2023-07-21

## 2023-06-26 RX ORDER — ENOXAPARIN SODIUM 100 MG/ML
40 INJECTION SUBCUTANEOUS ONCE
Status: COMPLETED | OUTPATIENT
Start: 2023-06-26 | End: 2023-06-26

## 2023-06-26 RX ORDER — POLYETHYLENE GLYCOL 3350 17 G/17G
17 POWDER, FOR SOLUTION ORAL ONCE
Status: COMPLETED | OUTPATIENT
Start: 2023-06-26 | End: 2023-06-26

## 2023-06-26 RX ORDER — GABAPENTIN 300 MG/1
300 CAPSULE ORAL 3 TIMES DAILY
Qty: 21 CAPSULE | Refills: 0 | Status: SHIPPED | OUTPATIENT
Start: 2023-06-26 | End: 2023-07-03

## 2023-06-26 RX ORDER — ACETAMINOPHEN 325 MG/1
975 TABLET ORAL EVERY 8 HOURS SCHEDULED
Qty: 63 TABLET | Refills: 0 | Status: SHIPPED | OUTPATIENT
Start: 2023-06-26 | End: 2023-07-03

## 2023-06-26 RX ORDER — PANTOPRAZOLE SODIUM 40 MG/1
40 TABLET, DELAYED RELEASE ORAL
Status: DISCONTINUED | OUTPATIENT
Start: 2023-06-27 | End: 2023-06-26 | Stop reason: HOSPADM

## 2023-06-26 RX ADMIN — PANTOPRAZOLE SODIUM 40 MG: 40 INJECTION, POWDER, FOR SOLUTION INTRAVENOUS at 08:15

## 2023-06-26 RX ADMIN — CELECOXIB 200 MG: 200 CAPSULE ORAL at 08:15

## 2023-06-26 RX ADMIN — ACETAMINOPHEN 975 MG: 325 TABLET ORAL at 11:43

## 2023-06-26 RX ADMIN — GABAPENTIN 300 MG: 300 CAPSULE ORAL at 08:15

## 2023-06-26 RX ADMIN — ACETAMINOPHEN 975 MG: 325 TABLET ORAL at 05:07

## 2023-06-26 RX ADMIN — POLYETHYLENE GLYCOL 3350 17 G: 17 POWDER, FOR SOLUTION ORAL at 08:15

## 2023-06-26 RX ADMIN — ENOXAPARIN SODIUM 40 MG: 40 INJECTION SUBCUTANEOUS at 14:12

## 2023-06-26 NOTE — PROGRESS NOTES
The pantoprazole has / have been converted to Oral per [facility]-Conversion Protocol for Adults as approved by the Pharmacy and Therapeutics Committee  The patient met all eligible criteria:  3 Age = 25years old   2) Received at least one dose of the IV form   3) Receiving at least one other scheduled oral/enteral medication   4) Tolerating an oral/enteral diet   and did not have any exclusions:   1) Critical care patient   2) Active GI bleed (IF assessing H2RAs or PPIs)   3) Continuous tube feeding (IF assessing cipro, doxycycline, levofloxacin, minocycline, rifampin, or voriconazole)   4) Receiving PO vancomycin (IF assessing metronidazole)   5) Persistent nausea and/or vomiting   6) Ileus or gastrointestinal obstruction   7) Darlene/nasogastric tube set for continuous suction   8) Specific order not to automatically convert to PO (in the order's comments or if discussed in the most recent Infectious Disease or primary team's progress notes)

## 2023-06-26 NOTE — UTILIZATION REVIEW
NOTIFICATION OF INPATIENT ADMISSION   AUTHORIZATION REQUEST   SERVICING FACILITY:   [facility]  Address: [address] [place] [address], [address] [place] [address]  Tax ID: [id number]  NPI: [id number] ATTENDING PROVIDER:  Attending Name and NPI#: Mildred Marrufo Md [[id number]]  Address: [address] [place] [address], [address] [place] [address]  Phone: [phone]:  Place of Service: [place] [address]  Place of Service Code: 21  Inpatient Admission Date/Time: 6/23/23 12:30 PM  Discharge Date/Time: No discharge date for patient encounter  Admitting Diagnosis Code/Description:  Colon adenocarcinoma ([facility] [address] ) [C18 9]     UTILIZATION REVIEW CONTACT:  Reyes Meredith Utilization   Network Utilization Review Department  Phone: [phone]  Fax: [phone]  Email: [email or web address]  Contact for approvals/pending authorizations, clinical reviews, and discharge  PHYSICIAN ADVISORY SERVICES:  Medical Necessity Denial & Xozl-nf-Acnw Review  Phone: [phone]  Fax: [phone]  Email: [email or web address]

## 2023-06-26 NOTE — DISCHARGE SUMMARY
Discharge Summary -surgical oncology  Alec Valerio 47 y o  female MRN: [id number]  Unit/Bed#: [facility]PHP 830-01 Encounter: [id number]    Admission Date: 6/23/2023     Discharge Date: 6/26/2023    Admitting Diagnosis: Colon adenocarcinoma [facility]) [C18 9]    Discharge Diagnosis: Sigmoid colon adenocarcinoma status post robotic sigmoidectomy by Dr Shmuel Hemphill    Attending and Service: Dr Shmuel Hemphill, surgical oncology services  Consulting Physician(s): None    Imaging and Procedures Performed:   6/23/2023 robotic sigmoidectomy by Dr Shmuel Hemphill    Pathology: Pending    Hospital Course: Alec Valerio is a 76-year-old female with a past medical history of sigmoid colon cancer presented for elective surgical intervention including robotic sigmoidectomy by Dr Shmuel Hemphill  Patient underwent surgical intervention without complications  Postoperatively she was transferred to the medical surgical floor where she was started on clear liquids, maintained on IV fluids, subcu heparin for DVT prophylaxis, Protonix, as needed antiemetics and PCA for pain control  She also had a Reyes in place  On postoperative day 1 the Reyes was removed and the PCA was discontinued and she was transitioned to multimodal pain control regimen  Throughout the rest of her hospital stay her diet was slowly advanced as tolerated  By postoperative day 3 patient was out of bed and ambulating without any assistance, tolerating diet without nausea or vomiting, having bowel function, urinating without any difficulties, and her pain remained controlled on p o  pain control regimen  Was cleared for discharge from a surgical oncology perspective  All discharge instructions as well as postoperative follow-up appointments were discussed with the patient at bedside, her and her  are in agreement with plan  Lovenox injection teaching daily until 7/19/2023 was given to patient  All questions answered      On discharge, the patient is instructed to follow-up with the patient's primary care provider within the next 2 weeks to review the events of the recent hospitalization  The patient is instructed to follow-up with Dr Ryan Chong as scheduled  The patient is instructed to follow the provided discharge instructions  Condition at Discharge: good     Discharge instructions/Information to patient and family:   See after visit summary for information provided to patient and family  Provisions for Follow-Up Care:  See after visit summary for information related to follow-up care and any pertinent home health orders  Disposition: Home    Planned Readmission: No    Discharge Statement   I spent 30 minutes discharging the patient  This time was spent on the day of discharge  I had direct contact with the patient on the day of discharge  Additional documentation is required if more than 30 minutes were spent on discharge  Discharge Medications:  See after visit summary for reconciled discharge medications provided to patient and family      Meghann Edmondson PA-C  6/26/2023  2:03 PM

## 2023-06-26 NOTE — DISCHARGE INSTR - AVS FIRST PAGE
DISCHARGE INSTRUCTIONS    Follow Up: Follow up with Dr Shmuel Hemphill on 7/6 at 10:30AM  -Lovenox 40mg Injection daily until 7/21/2023    Diet: Low fiber diet for 3 more days then may resume regular diet    Pain: Tylenol 975mg every 8 hours scheduled for 7 days  Celebrex 200mg daily for 7 days  Gabapentin 300mg three times a da for 7 days  Oxycodone 5mg as needed for moderate/severe pain every 6 hours  Shower: You may shower over the wound  Do not bathe or use a pool or hot tub until cleared by the physician  Activity: As tolerated  You may go up and down stairs, walk as much as you are comfortable, but walk at least 3 times each day  Do not lift anything heavier than 15 pounds for at least 2-4 weeks, unless cleared by the doctor  Driving: Do not drive or make any important decisions while on narcotic pain medication  Generally, you may drive when your off all narcotic pain medications  Medications: Resume all of your previous medications, unless told otherwise by the doctor  You do not need to take the narcotic pain medications unless you are having significant pain and discomfort  Call the office: If you are experiencing any of the following, fevers above 101 5° or chills, significant nausea or vomiting, increase in abdominal pain, if the wound develops drainage and/or is excessive redness around the wound, or if you have significant diarrhea or other worsening symptoms

## 2023-06-26 NOTE — OCCUPATIONAL THERAPY NOTE
Occupational Therapy Screen     06/26/23 0938   OT Last Visit   OT Visit Date 06/26/23   Note Type   Note type Screen         OT orders received and chart reviewed  Pt greeted this AM completing functional mobility with no AD and ADLs at an independent level  No acute OT needs at this time  DC skilled OT services  Thank you        Tomer Yousif, MS, OTR/L

## 2023-06-26 NOTE — DISCHARGE INSTRUCTIONS
Low Fiber Diet   WHAT YOU NEED TO KNOW:   A low-fiber diet limits foods that are high in fiber  Fiber is the part of fruits, vegetables, and grains that is not broken down by your body  You may need to follow this diet after surgery on your intestines  You may also need to follow this diet for certain conditions such as Crohn disease or ulcerative colitis  Ask your healthcare provider or dietitian how much fiber you can have each day  DISCHARGE INSTRUCTIONS:   Foods to include:  Read food labels to check the amount of fiber that are found in foods  Some foods that are low in fiber include the following:  Grains:  Choose grains that have less than 2 grams of fiber in each serving  Examples include the following:    Cream of wheat and finely ground grits    Dry cereal made from rice    White bread, white pasta, and white rice    Crackers, bagels, and rolls made from white or refined flour    Other foods:      Canned and well-cooked fruit without skins or seeds, and juice without pulp    Ripe bananas and melons    Canned and well-cooked vegetables without skins or seeds, and vegetable juice    Cow's milk, lactose-free milk, soy milk, and rice milk    Yogurt without nuts, fruit, or granola    Eggs, poultry (such as chicken and turkey), fish, and tender, ground, well-cooked beef    Tofu and smooth peanut butter    Broth and strained soups made of low-fiber foods    Foods to avoid:   Breads, cereals, crackers, and pasta made with whole wheat or whole grains (such as whole oats)    Green & Minor, wild rice, quinoa, kasha, and barley    All fresh fruit with skin, except banana and melons    Dried fruits and fruit juice with pulp    Canned pineapple    Raw vegetables    Nuts, seeds, and popcorn    Beans, nuts, peas, and lentils    Tough meats    Coconut and avocado    What else you should know about a low-fiber diet:  A low-fiber diet can decrease the amount of bowel movements you have   Drink liquids as directed to avoid constipation  Ask how much liquid to drink each day and which liquids are best for you  © Copyright Naren Cagey 2022 Information is for End User's use only and may not be sold, redistributed or otherwise used for commercial purposes  The above information is an  only  It is not intended as medical advice for individual conditions or treatments  Talk to your doctor, nurse or pharmacist before following any medical regimen to see if it is safe and effective for you

## 2023-06-27 NOTE — UTILIZATION REVIEW
NOTIFICATION OF ADMISSION DISCHARGE   This is a Notification of Discharge from [address]  Please be advised that this patient has been discharge from our facility  Below you will find the admission and discharge date and time including the patient’s disposition  UTILIZATION REVIEW CONTACT:  Yoel Trejo  Utilization   Network Utilization Review Department  Phone: [phone] x carefully listen to the prompts  All voicemails are confidential   Email: [email or web address]     ADMISSION INFORMATION  PRESENTATION DATE: 6/23/2023  5:17 AM  OBERVATION ADMISSION DATE:   INPATIENT ADMISSION DATE: 6/23/23 12:30 PM   DISCHARGE DATE: 6/26/2023  4:21 PM   DISPOSITION:Home/Self Care    IMPORTANT INFORMATION:  Send all requests for admission clinical reviews, approved or denied determinations and any other requests to dedicated fax number below belonging to the campus where the patient is receiving treatment   List of dedicated fax numbers:  [address] DENIALS (Administrative/Medical Necessity) [phone]   [address] (Maternity/NICU/Pediatrics) [phone]   [facility] [phone]   [place] [address] [phone]   [address] [phone]   220 [facility] [phone] 90 [facility] [phone]   [address] [place], [place] [address] [phone]   [facility]  [phone]   40[address] [facility] [phone]   [address] [facility] [address] [facility] [phone]

## 2023-07-06 ENCOUNTER — OFFICE VISIT (OUTPATIENT)
Dept: SURGICAL ONCOLOGY | Facility: CLINIC | Age: 53
End: 2023-07-06

## 2023-07-06 VITALS
DIASTOLIC BLOOD PRESSURE: 70 MMHG | HEART RATE: 65 BPM | RESPIRATION RATE: 16 BRPM | HEIGHT: 67 IN | WEIGHT: 155 LBS | OXYGEN SATURATION: 98 % | TEMPERATURE: 97.5 F | BODY MASS INDEX: 24.33 KG/M2 | SYSTOLIC BLOOD PRESSURE: 120 MMHG

## 2023-07-06 DIAGNOSIS — C18.9 COLON ADENOCARCINOMA (HCC): Primary | ICD-10-CM

## 2023-07-06 PROCEDURE — 99024 POSTOP FOLLOW-UP VISIT: CPT | Performed by: STUDENT IN AN ORGANIZED HEALTH CARE EDUCATION/TRAINING PROGRAM

## 2023-07-06 NOTE — PROGRESS NOTES
Surgical Oncology Follow Up    [address] [facility] [place]  [address] [facility]  [place] [address] [phone]    Shekhar Last  1970  [id number]    Diagnoses and all orders for this visit:    Colon adenocarcinoma [facility])        Chief Complaint   Patient presents with   • Post-op       No follow-ups on file. Oncology History   Colon adenocarcinoma ([address])   5/23/2023 Biopsy    A. Transverse colon polyp cold snare, polypectomy:  -   Fragments of sessile serrated adenoma (deeper levels examined). -   Separate scant fragments of tubular adenoma, likely tissue contaminant. B. Sigmoid colon polyp hot snare, polypectomy:  -   Invasive adenocarcinoma, moderately differentiated, arising in fragments of traditional serrated adenoma. -   MMR (MLH1, MSH2, MSH6 and PMS2) studies by immunohistochemistry on B2 are pending.  -   See comment and synoptic report. 6/8/2023 Initial Diagnosis    Colon adenocarcinoma ([address])     6/23/2023 Surgery    A. Sigmoid colon, sigmoidectomy:  -   Sigmoid colon with tattoo pigment and multinucleated giant cell reaction.  -   Negative for residual adenoma and carcinoma. -   Resection margins are negative for adenoma and carcinoma. -   Metastatic adenocarcinoma involving one of 19 lymph nodes (1/19, pN1a). 7/6/2023 -  Cancer Staged    Staging form: Colon and Rectum, AJCC 8th Edition  - Pathologic: Stage IIIA (pT1, pN1, cM0) - Signed by Vibha Francis MD on 7/6/2023           Staging: T1N1 colon cancer  Treatment history: 6/2023 robot RT colectomy  Current treatment: postop  Disease status: postop    History of Present Illness: Seen postop from RT hemicolectomy robo. Postop course uneventful. Recovering well. Path reviewed: T1N1    Review of Systems  Complete ROS Surg Onc:   Constitutional: The patient denies new or recent history of general fatigue, no recent weight loss, no change in appetite.    Eyes: No complaints of visual problems, no scleral icterus. ENT: no complaints of ear pain, no hoarseness, no difficulty swallowing,  no tinnitus and no new masses in head, oral cavity, or neck. Cardiovascular: No complaints of chest pain, no palpitations, no ankle edema. Respiratory: No complaints of shortness of breath, no cough. Gastrointestinal: No complaints of jaundice, no bloody stools, no pale stools. Genitourinary: No complaints of dysuria, no hematuria, no nocturia, no frequent urination, no urethral discharge. Musculoskeletal: No complaints of weakness, paralysis, joint stiffness or arthralgias. Integumentary: No complaints of rash, no new lesions. Neurological: No complaints of convulsions, no seizures, no dizziness. Hematologic/Lymphatic: No complaints of easy bruising. Endocrine:  No hot or cold intolerance. No polydipsia, polyphagia, or polyuria. Allergy/immunology:  No environmental allergies. No food allergies. Not immunocompromised. Skin:  No pallor or rash. No wound.         Patient Active Problem List   Diagnosis   • Colon adenocarcinoma [facility])     Past Medical History:   Diagnosis Date   • Abnormal Pap smear of cervix    • Sinus congestion     chronic   • Varicella      Past Surgical History:   Procedure Laterality Date   • BREAST EXCISIONAL BIOPSY Right 2008    benign   • FOOT NEUROMA SURGERY Left    • FOOT SURGERY     • HEMICOLOECTOMY W/ ANASTOMOSIS N/A 6/23/2023    Procedure: LAPAROSCOPIC SIGMOID COLECTOMY WITH ROBOTICS, FLEXIBLE PROCTOSCOPY;  Surgeon: Larry Henderson MD;  Location: [facility];  Service: Surgical Oncology   • US BREAST NEEDLE LOC RIGHT Right 10/08/2008     Family History   Problem Relation Age of Onset   • Diabetes Mother    • No Known Problems Father    • Breast cancer Maternal Grandmother         50-60's   • Stroke Maternal Grandmother    • Diabetes Maternal Grandmother    • Cancer Maternal Grandfather    • No Known Problems Paternal Grandmother    • No Known Problems Brother    • No Known Problems Half-Sister    • Breast cancer Maternal Aunt         68-70   • Endometrial cancer Maternal Aunt         maybe 40's   • No Known Problems Maternal Aunt    • Colon cancer Neg Hx    • Ovarian cancer Neg Hx      Social History     Socioeconomic History   • Marital status: /Civil Union     Spouse name: Not on file   • Number of children: Not on file   • Years of education: Not on file   • Highest education level: Not on file   Occupational History   • Not on file   Tobacco Use   • Smoking status: Former     Types: Cigarettes     Quit date:      Years since quittin.5   • Smokeless tobacco: Never   Vaping Use   • Vaping Use: Never used   Substance and Sexual Activity   • Alcohol use: Yes     Comment: social   • Drug use: Never   • Sexual activity: Yes     Partners: Male     Birth control/protection: I.U.D.      Comment: no new partner in past year   Other Topics Concern   • Not on file   Social History Narrative   • Not on file     Social Determinants of Health     Financial Resource Strain: Not on file   Food Insecurity: Not on file   Transportation Needs: Not on file   Physical Activity: Not on file   Stress: Not on file   Social Connections: Not on file   Intimate Partner Violence: Not on file   Housing Stability: Not on file       Current Outpatient Medications:   •  cholecalciferol (VITAMIN D3) 1,000 units tablet, Take 2,000 Units by mouth daily, Disp: , Rfl:   •  enoxaparin (Lovenox) 40 mg/0.4 mL, Inject 0.4 mL (40 mg total) under the skin in the morning for 25 days, Disp: 10 mL, Rfl: 0  •  Levonorgestrel (MIRENA) 20 MCG/DAY IUD, 1 each by Intrauterine route once, Disp: , Rfl:   •  celecoxib (CeleBREX) 200 mg capsule, Take 1 capsule (200 mg total) by mouth daily for 7 days (Patient not taking: Reported on 2023), Disp: 7 capsule, Rfl: 0  •  gabapentin (NEURONTIN) 300 mg capsule, Take 1 capsule (300 mg total) by mouth 3 (three) times a day for 7 days (Patient not taking: Reported on 7/6/2023), Disp: 21 capsule, Rfl: 0  No Known Allergies  Vitals:    07/06/23 1037   BP: 120/70   Pulse: 65   Resp: 16   Temp: 97.5 °F (36.4 °C)   SpO2: 98%       Physical Exam  Constitutional: Patient is well-developed and well-nourished who appears the stated age in no acute distress. Patient is pleasant and talkative. HEENT:  Normocephalic. Sclerae are anicteric. Mucous membranes are moist. Neck is supple without adenopathy. No JVD. Chest: Equal chest rise, easy WOB  Cardiac: Heart is regular rate. Abdomen: Abdomen is non-tender, non-distended and without masses. Incisions healing well  Extremities: There is no clubbing or cyanosis. There is no edema. Symmetric. Neuro: Grossly nonfocal. Gait is normal.     Lymphatic: No evidence of cervical adenopathy bilaterally. No evidence of axillary adenopathy bilaterally. No evidence of inguinal adenopathy bilaterally. Skin: Warm, anicteric. Psych:  Patient is pleasant and talkative. Pathology:  As above    Labs:  Reviewed in [employer] personally    Imaging  CT chest abdomen pelvis w contrast    Result Date: 6/12/2023  Narrative: CT CHEST, ABDOMEN AND PELVIS WITH IV CONTRAST INDICATION:   C18.9: Malignant neoplasm of colon, unspecified. COMPARISON:  None. TECHNIQUE: CT examination of the chest, abdomen and pelvis was performed. Multiplanar 2D reformatted images were created from the source data. This examination, like all CT scans performed in the [facility], was performed utilizing techniques to minimize radiation dose exposure, including the use of iterative reconstruction and automated exposure control. Radiation dose length product (DLP) for this visit:  717.16 mGy-cm IV Contrast:  80 mL of iohexol (OMNIPAQUE) Enteric Contrast: Enteric contrast was not administered. FINDINGS: CHEST LUNGS: No worrisome pulmonary nodule. There is no tracheal or endobronchial lesion. PLEURA:  Unremarkable.  HEART/GREAT VESSELS: Heart is unremarkable for patient's age. No thoracic aortic aneurysm. MEDIASTINUM AND BRYAN:  Unremarkable. CHEST WALL AND LOWER NECK:  Unremarkable. ABDOMEN LIVER/BILIARY TREE:  Unremarkable. GALLBLADDER:  No calcified gallstones. No pericholecystic inflammatory change. SPLEEN:  Unremarkable. PANCREAS:  Unremarkable. ADRENAL GLANDS:  Unremarkable. KIDNEYS/URETERS: One or more sharply circumscribed subcentimeter renal hypodensities are noted. These lesions are too small to accurately characterize, but are statistically most likely to represent benign cortical renal cyst(s). According to the guidelines published in the [facility] Paper of the ACR Incidental Findings Committee (Radiology 2010), no further workup of these lesions is recommended. No hydronephrosis. STOMACH AND BOWEL:  Unremarkable. APPENDIX:  No findings to suggest appendicitis. ABDOMINOPELVIC CAVITY:  No ascites. No pneumoperitoneum. No lymphadenopathy. VESSELS:  Unremarkable for patient's age. PELVIS REPRODUCTIVE ORGANS: There is an intrauterine device in the uterus. URINARY BLADDER:  Unremarkable. ABDOMINAL WALL/INGUINAL REGIONS:  Unremarkable. OSSEOUS STRUCTURES:  No acute fracture or destructive osseous lesion. Impression: No evidence for metastatic disease in the chest, abdomen, or pelvis. Workstation performed: [id number]       I reviewed the above laboratory and imaging data. Discussion/Summary:   48 yo female s/p robo RT gustavo 6/2023. T1N1. Refer to med onc. Will see her in 3 mo.

## 2023-07-07 ENCOUNTER — PATIENT OUTREACH (OUTPATIENT)
Dept: HEMATOLOGY ONCOLOGY | Facility: CLINIC | Age: 53
End: 2023-07-07

## 2023-07-07 ENCOUNTER — TELEPHONE (OUTPATIENT)
Dept: HEMATOLOGY ONCOLOGY | Facility: CLINIC | Age: 53
End: 2023-07-07

## 2023-07-07 DIAGNOSIS — C18.9 COLON ADENOCARCINOMA (HCC): Primary | ICD-10-CM

## 2023-07-07 NOTE — PROGRESS NOTES
Hematology/Oncology Outpatient Office Note    Date of Service: 2023    [facility]  [phone]    Reason for Consultation:   Chief Complaint   Patient presents with   • Consult       Cancer Pathologic Stage at diagnosis: IIIA    Referral Physician: Haroldo Patterson MD    Primary Care Physician:  Godfrey Ramos DO     Nickname: Lelo    Spouse: Raissa Garcia ECO     Today's ECO    Goals and Barriers:  Current Goal: Minimize effects of disease burden, extend life. Barriers to accomplishing this: None    Patient's Capacity to Self Care:  Patient is able to self care      ASSESSMENT & PLAN      Diagnosis ICD-10-CM Associated Orders   1. Colon adenocarcinoma ([address])  C18.9 Ambulatory referral to Hematology / Oncology            This is a 47 y.o. c PMHx notable for sinus congestion, being seen in consultation for early stage colon adenocarcinoma      Discussion of decision making  • Oncology history updated, accordingly, during this visit  • Goals of care/patient communication  o I discussed with the patient the clinical course leading up to their cancer diagnosis. I reviewed relevant office notes, imaging reports and pathology result as well.  o I told the patient that this is a case of curable disease and what this means. We discussed that the goal of anti-cancer therapy is to provide best quality of life, extend overall survival, and progression free survival as shown in clinical trials.  We also discussed that there might be a point when the cancer will no longer respond to this anti-neoplastic therapy.   o I explained the risks/benefits of the proposed cancer therapy: CAPOX for 3 months and after discussion including understanding risks of possible life-threatening complications and therapy-related malignancy development, informed consent for blood products and treatment has been signed and obtained. • TNM/Staging At Diagnosis  Cancer Staging  Colon adenocarcinoma [facility])  Staging form: Colon and Rectum, AJCC 8th Edition  - Pathologic: Stage IIIA (pT1, pN1, cM0) - Signed by Oleg Hicks MD on 7/6/2023  • Disease Features/Tumor Markers/Genetics  o Tumor Marker: n/a  o Notable Path Features: MMR-proficient, 1/19 LN involved  • Treatment: CAPOX  • Other Supportive care:   • Treatment Team Members  o Surgeon: Dr. Melia Reis  o Palliative  • Labs  • Diagnostics  6/11/2023 CT CAP w/c: No evidence for metastatic disease in the chest, abdomen, or pelvis      This is a patient with Stage III L sided colon cancer with only one high risk features (LN). The patient is MMR-proficient. From an overall performance status basis, I believe the patient can tolerate systemic treatment. Prognosis is more hopeful as we are dealing with an originating left sided. Thoughts on approach to systemic therapy in the first line and subsequent lines of therapy: If no adjuvant therapy or if Xeloda/5-FU done: The next stage in the evolution of adjuvant therapy involved the evaluation of 5-FU with leucovorin in several key trials. The NSABP C-03 study72 reported a 3-year disease-free survival (DFS) rate of 73% for patients receiving 5-FU/leucovorin, compared with a rate of 64% for those who received a combination of the alkylating nitrosourea lomustine, the alkaloid vincristine, and 5-FU (MOF; P = .0004). The IMPACT (International Multicenter Pooled Analysis of Colorectal Cancer Trials)73 pooled data from 3 randomized trials that investigated high-dose 5-FU/leucovorin compared with no adjuvant therapy. 5-FU/leucovorin reduced mortality by 22% (P = .029) and CRC events by 35% (P < .0001) compared with no adjuvant therapy    In the X-ACT (Xeloda in Adjuvant Colon Cancer Therapy) trial,78 patients were randomized to capecitabine or the [facility] regimen.  There were no statistically significant differences between the 2 arms in terms of DFS (64.2% vs. 60.6%, respectively; P = NS) or OS (81.3% and 77.6%; P = .05). However, capecitabine was associated with significantly fewer adverse events than the [facility] regimen (P < .001). The NSABP C-06 study,79 which compared tegafur with leucovorin versus the [facility] regimen, reported that 5-year DFS (68.2% vs. 67.0%, respectively; P = NS) and OS (78.7% vs. 78.5%; P = NS) were similar for the 2 treatments. IDEA trial  We went over the IDEA trial which showed that 3 months of CAPOX was non-inferior to 6 months of mFOLFOX6 in all but high risk Stage 3 colon cancer. The absolute 0.4% difference in 5-year overall survival was noted. In high risk stage III colon cancers like her (T4), there would be nearly 1 more person out of a 100 alive at 5 years than otherwise would have . Grade ? 2 neurotoxicity during active therapy and in the first month after stopping study treatment occurred in 16.0% of patients in the 3-month group vs 44.5% of those in the 6-month group (CAPOX vs mFOLFOX6). Based on  ACCENT/IDEA pooled analysis of 11 trials, Raina et al suggested that early oxaliplatin discontinuation after 50% of the intended treatment was given (usually reason of Grade 1-2 neuropathy) did not impact 3 year DFS or 5 year OS (note that these numbers were negatively affected when <50% of intended oxaliplatin was given). 2.5% of pts had grade 3/4 neuropathy after 3 months of chemotherapy versus 15.9% who had 6 months. As a result, it is reasonable to discontinue oxaliplatin after 3 months for most patients      Survival Rates    In [facility], patients with stage II or III disease were randomly assigned to receive adjuvant FOLFOX4 or 5-FU/leucovorin.  After a median follow-up of 9.5 years, 10-year overall survival among all 2,246 patients was 71.7% in the FOLFOX4 group vs 67.1% in the 5-FU/leucovorin group (hazard ratio [HR] = 0.85, P = .043), 78.4% vs 79.5% in those with stage II disease (HR = 1.00, P = .980), and 67.1% vs 59.0% in those with stage III disease (HR = 0.80, P = .016). PO option: Xeloda 4713-3405 mg/m2 BID for 12 weeks (14 days on and 7 days off). Can start at 500 mg/m2 initially to work-up along with Oxaliplatin 135 mg/m2 q3 weeks        Discussion of disease response monitoring: We discussed with the patient at length the role of imaging and potential blood monitoring of burden of disease. In addition, we discussed at length the role of increasingly recognized peripheral blood monitoring of disease burden. Cell-free Circulating Tumor DNA Variant Allele Frequency has been associated with survival in metastatic cancer, albeit mainly determined in retrospective studies to date (https://pubmed.ncbi.nlm.nih.gov/79263686/). The patient had all of their questions and concerns answered and we will elect to pursue this. Restaging CT Chest/abdomen/pelvis (CAP) scans will be planned q6 months for 5 years. H&P q3-6 months in the first 2 years and then q6 months thereafter until year 5. CEA q6 months. C-scope 12 months after surgery (3-6 months if there was a pre-obstructing lesion prior to surgery not allowing c-scope to be done)      Discussion of decision making    I personally reviewed the following lab results, the image studies, pathology, other specialty/physicians consult notes and recommendations, and outside medical records. I had a lengthy discussion with the patient and shared the work-up findings. We discussed the diagnosis and management plan as below. I spent 62 minutes reviewing the records (labs, clinician notes, outside records, medical history, ordering medicine/tests/procedures, interpreting the imaging/labs previously done) and coordination of care as well as direct time with the patient today, of which greater than 50% of the time was spent in counseling and coordination of care with the patient/family.     · Plan/Labs  · Zofran 8mg q8 prn nausea/vomiting to be sent home  · CAPOX (Capecetaibine 500mg/m2 for C1, 1000 mg/m2 for C2) to be given q3 weeks for 4 cycles, C1 coming up (CEA added to this, then will be due 12/2023)  · Restaging CT to be planned for 12/2023  · Signatera surveillance being ordered 5 times until 5/2024  · F/u Surg-Onc for post surgery care, will be due for 1 year C-scope 6/2024        Follow Up: q3 weeks while on systemic therapy x 4 visits    All questions were answered to the patient's satisfaction during this encounter. The patient knows the contact information for our office and knows to reach out for any relevant concerns related to this encounter. They are to call for any temperature 100.4 or higher, new symptoms including but not restricted to shaking chills, decreased appetite, nausea, vomiting, diarrhea, increased fatigue, shortness of breath or chest pain, confusion, and not feeling the strength to come to the clinic. For all other listed problems and medical diagnosis in their chart - they are managed by PCP and/or other specialists, which the patient acknowledges. Thank you very much for your consultation and making us a part of this patient's care. We are continuing to follow closely with you. Please do not hesitate to reach out to me with any additional questions or concerns. Hortencia Wallace MD  Hematology & Medical Oncology Staff Physician             Disclaimer: This document was prepared using [employer] technology. If a word or phrase is confusing, or does not make sense, this is likely due to recognition error which was not discovered during this clinician's review. If you believe an error has occurred, please contact me through [address] [facility] line for elias? cation. ONCOLOGY HISTORY OF PRESENT ILLNESS        Oncology History   Colon adenocarcinoma ([address])   5/23/2023 Biopsy    A.  Transverse colon polyp cold snare, polypectomy:  -   Fragments of sessile serrated adenoma (deeper levels examined). -   Separate scant fragments of tubular adenoma, likely tissue contaminant. B. Sigmoid colon polyp hot snare, polypectomy:  -   Invasive adenocarcinoma, moderately differentiated, arising in fragments of traditional serrated adenoma. -   MMR (MLH1, MSH2, MSH6 and PMS2) studies by immunohistochemistry on B2 are pending.  -   See comment and synoptic report. 6/8/2023 Initial Diagnosis    Colon adenocarcinoma ([address])     6/23/2023 Surgery    A. Sigmoid colon, sigmoidectomy:  -   Sigmoid colon with tattoo pigment and multinucleated giant cell reaction.  -   Negative for residual adenoma and carcinoma. -   Resection margins are negative for adenoma and carcinoma. -   Metastatic adenocarcinoma involving one of 19 lymph nodes (1/19, pN1a). 7/6/2023 -  Cancer Staged    Staging form: Colon and Rectum, AJCC 8th Edition  - Pathologic: Stage IIIA (pT1, pN1, cM0) - Signed by Stoney Fonseca MD on 7/6/2023         Status post robotic sigmoidectomy [6/26/2023-Dr. Locke].  Mismatch repair protein intact. Her cancer was identified by routine colonoscopy [3/24/23].  Results of the colonoscopy with biopsy of one of the 2 polyps (sigmoid) that were removed showed invasive adenocarcinoma, moderately differentiated, arising in fragments of traditional serrated adenoma.  Patient underwent subsequent sigmoid colectomy which showed metastatic adenocarcinoma involving one of 19 lymph nodes (1/19, pN1a).    Patient denies any complications.  Denies any blood in stool or urine    SUBJECTIVE  (INTERVAL HISTORY)      Clotting History None   Bleeding History None   Cancer History Colon   Family Cancer History pGrandfather (colon), mGrandmother (breast), mAunt (breast), mAunt (uterine)   H/O Blood/Plt Transfusion None   Tobacco/etoh/drug abuse 15-pack-year smoking history [quit 2012]    Hx COVID19 Infection and Vaccine Status January 2022 COVID, vaccinated    Cancer Screening history up-to-date,  Next colonoscopy due 6/2024   Occupation retired, was working for [employer] over last few days over lower abdomen. I have reviewed the relevant past medical, surgical, social and family history. I have also reviewed allergies and medications for this patient. Review of Systems    Baseline weight: 155 lbs    Denies F/C, N/V, SOB, CP, LH, HA, rash, itching, gen weakness, melena, hematuria, hematochezia, falls, diarrhea, or constipation       A 10-point review of system was performed, pertinent positive and negative were detailed as above. Otherwise, the 10-point review of system was negative.       Past Medical History:   Diagnosis Date   • Abnormal Pap smear of cervix    • Sinus congestion     chronic   • Varicella        Past Surgical History:   Procedure Laterality Date   • BREAST EXCISIONAL BIOPSY Right 2008    benign   • FOOT NEUROMA SURGERY Left    • FOOT SURGERY     • HEMICOLOECTOMY W/ ANASTOMOSIS N/A 6/23/2023    Procedure: LAPAROSCOPIC SIGMOID COLECTOMY WITH ROBOTICS, FLEXIBLE PROCTOSCOPY;  Surgeon: Claudia Agrawal MD;  Location: [facility];  Service: Surgical Oncology   • US BREAST NEEDLE LOC RIGHT Right 10/08/2008       Family History   Problem Relation Age of Onset   • Diabetes Mother    • No Known Problems Father    • Breast cancer Maternal Grandmother         50-60's   • Stroke Maternal Grandmother    • Diabetes Maternal Grandmother    • Cancer Maternal Grandfather    • No Known Problems Paternal Grandmother    • No Known Problems Brother    • No Known Problems Half-Sister    • Breast cancer Maternal Aunt         68-70   • Endometrial cancer Maternal Aunt         maybe 40's   • No Known Problems Maternal Aunt    • Colon cancer Neg Hx    • Ovarian cancer Neg Hx        Social History     Socioeconomic History   • Marital status: /Civil Union     Spouse name: Not on file   • Number of children: Not on file   • Years of education: Not on file   • Highest education level: Not on file Occupational History   • Not on file   Tobacco Use   • Smoking status: Former     Types: Cigarettes     Quit date:      Years since quittin.5   • Smokeless tobacco: Never   Vaping Use   • Vaping Use: Never used   Substance and Sexual Activity   • Alcohol use: Yes     Comment: social   • Drug use: Never   • Sexual activity: Yes     Partners: Male     Birth control/protection: I.U.D. Comment: no new partner in past year   Other Topics Concern   • Not on file   Social History Narrative   • Not on file     Social Determinants of Health     Financial Resource Strain: Not on file   Food Insecurity: Not on file   Transportation Needs: Not on file   Physical Activity: Not on file   Stress: Not on file   Social Connections: Not on file   Intimate Partner Violence: Not on file   Housing Stability: Not on file       No Known Allergies    Current Outpatient Medications   Medication Sig Dispense Refill   • cholecalciferol (VITAMIN D3) 1,000 units tablet Take 2,000 Units by mouth daily     • enoxaparin (Lovenox) 40 mg/0.4 mL Inject 0.4 mL (40 mg total) under the skin in the morning for 25 days 10 mL 0   • Levonorgestrel (MIRENA) 20 MCG/DAY IUD 1 each by Intrauterine route once     • celecoxib (CeleBREX) 200 mg capsule Take 1 capsule (200 mg total) by mouth daily for 7 days (Patient not taking: Reported on 2023) 7 capsule 0   • gabapentin (NEURONTIN) 300 mg capsule Take 1 capsule (300 mg total) by mouth 3 (three) times a day for 7 days (Patient not taking: Reported on 2023) 21 capsule 0     No current facility-administered medications for this visit. (Not in a hospital admission)      Objective:     24 Hour Vitals Assessment:     Vitals:    23 0947   BP: 124/80   Pulse: 62   Resp: 18   Temp: (!) 96.8 °F (36 °C)   SpO2: 98%       PHYSICIAN EXAM:    General: Appearance: alert, cooperative, no distress. HEENT: Normocephalic, atraumatic. No scleral icterus. conjunctivae clear. EOMI.   Chest: No tenderness to palpation. No open wound noted. Lungs: Clear to auscultation bilaterally, Respirations unlabored. Cardiac: Regular rate and rhythm, +S1and S2  Abdomen: Soft, non-tender, non-distended. Bowel sounds are normal.   Extremities:  No edema, cyanosis, clubbing. Skin: Skin color, turgor are normal. No rashes. Lymphatics: no palpable supra-cervical, axillary, or inguinal adenopathy  Neurologic: Awake, Alert, and oriented, no gross focal deficits noted b/l. DATA REVIEW:    Pathology Result:    Final Diagnosis   Date Value Ref Range Status   06/23/2023   Final    A. Sigmoid colon, sigmoidectomy:  -   Sigmoid colon with tattoo pigment and multinucleated giant cell reaction.  -   Negative for residual adenoma and carcinoma. -   Resection margins are negative for adenoma and carcinoma. -   Metastatic adenocarcinoma involving one of 19 lymph nodes (1/19, pN1a). Interpretation performed at [address] ([place]), [address] [facility]      05/23/2023   Final    A. Transverse colon polyp cold snare, polypectomy:  -   Fragments of sessile serrated adenoma (deeper levels examined). -   Separate scant fragments of tubular adenoma, likely tissue contaminant. B. Sigmoid colon polyp hot snare, polypectomy:  -   Invasive adenocarcinoma, moderately differentiated, arising in fragments of traditional serrated adenoma. -   MMR (MLH1, MSH2, MSH6 and PMS2) studies by immunohistochemistry on B2 are pending.  -   See comment and synoptic report. Comment:   Part B) CD31/CKC immunostain on B3 is negative for lymphovascular invasion. Select slide B2 is reviewed by Dr. Merlin Hurl who concurs with the diagnosis. Diagnosis is communicated via Guided Interventions to Dr. Delmy Bender on 6/1/2023 at [address] Interpretation performed at [facility] [address] [facility], [address]           Image Results:   Image result are reviewed and documented in Hematology/Oncology history    CT chest abdomen pelvis w contrast  Narrative: CT CHEST, ABDOMEN AND PELVIS WITH IV CONTRAST    INDICATION:   C18.9: Malignant neoplasm of colon, unspecified. COMPARISON:  None. TECHNIQUE: CT examination of the chest, abdomen and pelvis was performed. Multiplanar 2D reformatted images were created from the source data. This examination, like all CT scans performed in the [facility], was performed utilizing techniques to minimize radiation dose exposure, including the use of iterative reconstruction and automated exposure control. Radiation dose length   product (DLP) for this visit:  717.16 mGy-cm    IV Contrast:  80 mL of iohexol (OMNIPAQUE)  Enteric Contrast: Enteric contrast was not administered. FINDINGS:    CHEST    LUNGS: No worrisome pulmonary nodule. There is no tracheal or endobronchial lesion. PLEURA:  Unremarkable. HEART/GREAT VESSELS: Heart is unremarkable for patient's age. No thoracic aortic aneurysm. MEDIASTINUM AND BRYAN:  Unremarkable. CHEST WALL AND LOWER NECK:  Unremarkable. ABDOMEN    LIVER/BILIARY TREE:  Unremarkable. GALLBLADDER:  No calcified gallstones. No pericholecystic inflammatory change. SPLEEN:  Unremarkable. PANCREAS:  Unremarkable. ADRENAL GLANDS:  Unremarkable. KIDNEYS/URETERS: One or more sharply circumscribed subcentimeter renal hypodensities are noted. These lesions are too small to accurately characterize, but are statistically most likely to represent benign cortical renal cyst(s). According to the   guidelines published in the [facility] Paper of the ACR Incidental Findings Committee (Radiology 2010), no further workup of these lesions is recommended. No hydronephrosis. STOMACH AND BOWEL:  Unremarkable. APPENDIX:  No findings to suggest appendicitis. ABDOMINOPELVIC CAVITY:  No ascites. No pneumoperitoneum. No lymphadenopathy. VESSELS:  Unremarkable for patient's age.     PELVIS    REPRODUCTIVE ORGANS: There is an intrauterine device in the uterus. URINARY BLADDER:  Unremarkable. ABDOMINAL WALL/INGUINAL REGIONS:  Unremarkable. OSSEOUS STRUCTURES:  No acute fracture or destructive osseous lesion. Impression: No evidence for metastatic disease in the chest, abdomen, or pelvis. Workstation performed: [id number]      LABS:  Lab data are reviewed and documented in HemOn history. Lab Results   Component Value Date    HGB 10.8 (L) 06/25/2023    HCT 33.9 (L) 06/25/2023    MCV 97 06/25/2023     06/25/2023    WBC 6.35 06/25/2023    NRBC 0 06/25/2023     Lab Results   Component Value Date    K 4.2 06/26/2023     06/26/2023    CO2 30 06/26/2023    BUN 11 06/26/2023    CREATININE 1.03 06/26/2023    GLUF 91 06/14/2023    CALCIUM 9.3 06/26/2023    AST 16 06/14/2023    ALT 13 06/14/2023    ALKPHOS 62 06/14/2023    EGFR 62 06/26/2023       No results found for: "IRON", "TIBC", "FERRITIN"    No results found for: "Carmelia Estrella"    No results for input(s): "WBC", "CREAT", "PLT" in the last 72 hours.     By:  Arma Pallas, MD, 7/17/2023, 10:20 AM

## 2023-07-07 NOTE — TELEPHONE ENCOUNTER
I called Brooklyn Toure in response to a referral that was received for patient to establish care with Medical Oncology. Outreach was made to schedule a consultation. .    A consultation was scheduled for patient during this call.  Patient is scheduled on 7/14/23 at 3:20pm with Dr. Arash Watson at the [address] [place] [address]

## 2023-07-07 NOTE — TELEPHONE ENCOUNTER
Patient called wanting to change her Consult to Dr. Jenny Collier. I advised her dates are  7/24/23 and [facility] is 8/1/23. Patient is going on vacation from 7/22 to 7/29. She said the 8/1/23 was too far. She didn't wasn't o see Dr. Sharyn Marie due to hearing things. I advised her that she should keep her 7/14/23 with Sharyn Marie and see how she likes him and if she still feels the same after her consult, she can do a JONATHAN.

## 2023-07-07 NOTE — PROGRESS NOTES
NN phone outreach to the pt, I explained my role and d/w her the upcoming appt she had made today I was aware she was not the happiest about, she did express this again to me when I spoke w her and said that she did not feel comfortable seeing the provider she was originally sched w and wanted to know if there was another provider she could see, she said she didn't care about the travel she would be willing, we agreed to set her up w Dr. Patricia Kirby and she is sched to meet him for consultation following her recent colon surg on 7/17. Pt is going away on vacation from 7/22-7/29, [place] wanted to know if she could start tx after her vacation so she could swim. I told her I would reach out to [facility] and coordinate w her a date after her vacation time, that this would be ok. We completed the general assessment together, referral put in for onc SW. I d/w pt details on the port, she was aware of the port and said Dr. Jackie Zheng spoke to her about this, she does not have a date at this time, I let her know I would reach out to [facility] to assist w getting a date for the port. Pt did not want genetic testing at this time, she also declined info on CSC, she does have a good support system w spouse and friends, she is holding up well following her surgery, very minor pain at the incision sites but healing well, she has no other c/c at this time, appetite and wgt both maintained. I asked that she save my contact info and that she would be hearing from my coordinator, Tavia Parks, following her med onc and port appts to check in and we would both be here for her if she was to need anything or have any questions. She thanked me and was very appreciative of my help saying "you made my day".

## 2023-07-09 ENCOUNTER — PATIENT OUTREACH (OUTPATIENT)
Dept: CASE MANAGEMENT | Facility: OTHER | Age: 53
End: 2023-07-09

## 2023-07-09 NOTE — PROGRESS NOTES
OSW received SW referral. Pt has her consult with Dr. Ruby Parham on 7/17. OSW will place outreach TC after her consult to complete the distress thermometer and psychosocial assessment.

## 2023-07-12 ENCOUNTER — TELEPHONE (OUTPATIENT)
Dept: GENETICS | Facility: CLINIC | Age: 53
End: 2023-07-12

## 2023-07-12 ENCOUNTER — PATIENT OUTREACH (OUTPATIENT)
Dept: HEMATOLOGY ONCOLOGY | Facility: CLINIC | Age: 53
End: 2023-07-12

## 2023-07-12 DIAGNOSIS — C18.9 COLON ADENOCARCINOMA (HCC): Primary | ICD-10-CM

## 2023-07-12 NOTE — TELEPHONE ENCOUNTER
I called Berenice Leon to schedule a new patient appointment with the Cancer Risk and Genetics Program.      Outcome:  Genetics appointment scheduled for 8/3 at 1:45    Personal/Family History Related to Appointment:  Recently dx with Colon Ca. Fhx of colon ca, breast ca, uterine ca, lung ca. Non-Confucianism.     History of Genetic Testing:  Patient reports no personal or family history of genetic testing    Genetics Family History Questionnaire:  I confirmed the patient's e-mail on file as the best e-mail to send an invite link for our genetics family history intake

## 2023-07-12 NOTE — PROGRESS NOTES
Pt called me today to ask if I could help her get set up w genetics, I told her that I placed the referral in today and that someone would be reaching out to get her set up w them for an appt. She asked about her port and when that would be sched, I let her know that I heard back from Dr. Aliyah Mcgregor RN that they have her on for 8/3 tentatively but that Dr. Magali Hernandez wanted the pt to meet w med onc first before getting her sched for the port, she would be set up to see Dr. Magali Hernandez either on the same day or the next to speak to the pt about the port and for her to sign consent. Pt asked about the chemo timeline, explained she would be tx at least 3 mo every other wk and then reimaged w CT scan and then it would be decided if the pt would need an additional three months. Explained the break down for the cycles. She also asked if she would rcv FOLFOX is that typically the drug of choice, I told her that it is, she asked if she should ice her hands before starting the chemo, I told her that this would not be a good idea since the FOLFOX creates a sensitivity to the cold and would not be comfortable maybe even painful if she was to put ice over her hands while undergoing tx, I told her that this should be something she discusses w the medical oncologist at her upcoming appt. She thanked me for my help today.

## 2023-07-17 ENCOUNTER — CONSULT (OUTPATIENT)
Dept: HEMATOLOGY ONCOLOGY | Facility: CLINIC | Age: 53
End: 2023-07-17
Payer: COMMERCIAL

## 2023-07-17 VITALS
RESPIRATION RATE: 18 BRPM | TEMPERATURE: 96.8 F | DIASTOLIC BLOOD PRESSURE: 80 MMHG | BODY MASS INDEX: 24.48 KG/M2 | WEIGHT: 156 LBS | HEART RATE: 62 BPM | OXYGEN SATURATION: 98 % | HEIGHT: 67 IN | SYSTOLIC BLOOD PRESSURE: 124 MMHG

## 2023-07-17 DIAGNOSIS — C18.9 COLON ADENOCARCINOMA (HCC): Primary | ICD-10-CM

## 2023-07-17 DIAGNOSIS — R11.2 CHEMOTHERAPY INDUCED NAUSEA AND VOMITING: ICD-10-CM

## 2023-07-17 DIAGNOSIS — T45.1X5A CHEMOTHERAPY INDUCED NAUSEA AND VOMITING: ICD-10-CM

## 2023-07-17 PROCEDURE — 99215 OFFICE O/P EST HI 40 MIN: CPT | Performed by: INTERNAL MEDICINE

## 2023-07-17 RX ORDER — DEXTROSE MONOHYDRATE 50 MG/ML
20 INJECTION, SOLUTION INTRAVENOUS ONCE
OUTPATIENT
Start: 2023-07-31

## 2023-07-17 RX ORDER — SODIUM CHLORIDE 9 MG/ML
20 INJECTION, SOLUTION INTRAVENOUS ONCE
OUTPATIENT
Start: 2023-07-31

## 2023-07-17 RX ORDER — ONDANSETRON 8 MG/1
8 TABLET, ORALLY DISINTEGRATING ORAL EVERY 8 HOURS PRN
Qty: 20 TABLET | Refills: 3 | Status: SHIPPED | OUTPATIENT
Start: 2023-07-17

## 2023-07-17 NOTE — PROGRESS NOTES
New labs ordered patient goes to [facility]. Reviewed oncolink printouts for oxaliplatin and xeloda. Reviewed administration recommendations with lab recommendations. Reviewed possible side effects as well. Reviewed patient would like to do icing, per patient, Dr. Nisa Cavazos is aware. Reviewed office handouts with RN teams number and [phone] number to further discuss questions and concerns. Dr. Nisa Cavazos would like patient to start on 625mg/m2 BID 14 days on 7 days off then titrate to 1000mg/m2 BID 14 days on 7 days off for cycle 2. He recommended for patient to have labs done a couple of days prior to 7/31 and to also start xeloda same day as infusion. My chart message to patient with updated recommendations. Consent obtained on 7/17/23. Copy given to patient and uploaded into patient chart on 7/17/23. Email sent to oral chemotherapy team on 7/17/23.

## 2023-07-18 ENCOUNTER — TELEPHONE (OUTPATIENT)
Dept: HEMATOLOGY ONCOLOGY | Facility: CLINIC | Age: 53
End: 2023-07-18

## 2023-07-18 ENCOUNTER — PATIENT OUTREACH (OUTPATIENT)
Dept: CASE MANAGEMENT | Facility: OTHER | Age: 53
End: 2023-07-18

## 2023-07-18 NOTE — TELEPHONE ENCOUNTER
What would be a preferred day of the week that would work best for your infusion appointment? Mondays  Do you prefer mornings or afternoons for your appointments? AM preferred PM if necessary  Are there any days or dates that do not work for your schedule, including any upcoming vacations? 9/11 - make on 9/12  We are going to try our best to schedule you at the infusion center closest to your home. In the event that we are unable to what would be your next preferred infusion site or sites? [facility]    Do you have transportation to take you to all of your appointments?  yes  Would you like the infusion center to draw labs from your port? (disregard if patient doesn't have a port or need labs for infusion appointment) no

## 2023-07-18 NOTE — PROGRESS NOTES
Biopsychosocial and Barriers Assessment    Cancer Diagnosis: Colon  Home/Cell Phone: [phone]  Emergency Contact: Jarvis Michele  Marital Status:   Interpretation concerns, speaks another language, preferred language: English  Cultural concerns: none  Ability to read or write: yes    Caregiver/Support: Strong support from family/friends  Children: 2 grown children  Child/Elder care: n/a    [address] [place] [address] Road: 5 steps to enter  Lives With: Spouse  Daily Living Activities: independent  Durable Medical Equipment: none  Ambulation: independent     Preferred Pharmacy: [facility] co-pays with insurance: [employer] co-pays with medication coverage: none  No medication coverage: n/a      Primary Care Provider: Dr. Leyda Justice  Hx of [address]: none  Hx of Short term rehab: none  Mental Health Hx: worry/anxiety, sadness/depression  Substance Abuse Hx: none  Employment: retired   Status/Location: [address] [place] [address] to pay bills: yes  POA/LW/AD: not addressed  Transportation Plan/Concerns: self      What do you know about your Cancer Diagnosis    What has your doctor told you about your cancer diagnosis: Colon cancer. What has your doctor told you about your cancer treatment: I will be receiving chemotherapy. What specific concerns do you have about your diagnosis and treatment: None at this time. She feels good about the plan and speaks highly of her medical team.    Have you been made aware of any hair loss associated with treatment: None    Additional Comments:   OSW placed outreach TC to pt this afternoon. OSW introduced self and role. Pt is a pleasant woman with a new dx of colon cancer. She had a sigmoidectomy with Dr. Alissa Kan. She has recovered well from surgery and will begin chemotherapy soon. Pt states that she is eager to begin and have these 3 months of chemo finished.  She is very positive and states she feels good about the plan and her medical team. She states that she is well supported by family and friends. OSW educated on the [facility] and he was already provided with their information. Pt does not have any SW needs at this time. OSW educated her that my colleague, Rafael Singh, is at [address] in case any needs present in the future. She was appreciative of the outreach.

## 2023-07-19 ENCOUNTER — PATIENT OUTREACH (OUTPATIENT)
Dept: HEMATOLOGY ONCOLOGY | Facility: CLINIC | Age: 53
End: 2023-07-19

## 2023-07-19 DIAGNOSIS — C18.9 COLON ADENOCARCINOMA (HCC): Primary | ICD-10-CM

## 2023-07-19 RX ORDER — CAPECITABINE 500 MG/1
1000 TABLET, FILM COATED ORAL 2 TIMES DAILY
Qty: 56 TABLET | Refills: 0 | Status: SHIPPED | OUTPATIENT
Start: 2023-07-19 | End: 2023-07-24

## 2023-07-19 RX ORDER — CAPECITABINE 150 MG/1
150 TABLET, FILM COATED ORAL 2 TIMES DAILY
Qty: 28 TABLET | Refills: 0 | Status: SHIPPED | OUTPATIENT
Start: 2023-07-19 | End: 2023-07-24

## 2023-07-19 NOTE — PROGRESS NOTES
Called pt to check in prior to her starting treatment. We confirmed where and when to go for her bloodwork prior to the scheduled treatments. She is aware of all the scheduled treatment dates and the location. She stated that her  will be going with her to her treatments. She does have a genetics consult already scheduled.   I explained that myself and the [facility] will be checking in with her, and she has our contact information if she needs anything

## 2023-07-20 NOTE — PROGRESS NOTES
Progress Note - Surgical Oncology  : JANET White Surgery Resident on [facility] 47 y o  female MRN: [id number]  Unit/Bed#: [facility] 830-01 Encounter: [id number]      Assessment:  47 y o  female s/p 6/23 robotic sigmoidectomy       Plan:  Lo res and off IVF today hopefully  Multimodal + PRN analgesia  OOB, ambulate  DVT ppx        Subjective: Feels well, tolerating CTC, denies bowel function but thinks she'll have flatus soon      Objective:     Physical Exam:  GEN: NAD   Ab: Soft, NT/ND, CDI  Lung: Normal effort   CV: RRR   Extrem: No CCE   Neuro: A+Ox3       I/O       06/23 0701  06/24 0700 06/24 0701  06/25 0700    P  O  180 200    I V  2609 861 4    IV Piggyback 100     Total Intake 2889 1061 4    Urine 3155 1250    Total Output 3155 1250    Net -266 -188  6                Lab, Imaging and other studies: I have personally reviewed pertinent reports    , CBC with diff:   Lab Results   Component Value Date    WBC 10 96 (H) 06/24/2023    HGB 11 8 06/24/2023    HCT 35 7 06/24/2023    MCV 94 06/24/2023     06/24/2023    RBC 3 82 06/24/2023    MCH 30 9 06/24/2023    MCHC 33 1 06/24/2023    RDW 12 9 06/24/2023    MPV 11 2 06/24/2023   , BMP/CMP:   Lab Results   Component Value Date    SODIUM 139 06/24/2023    K 4 0 06/24/2023     (H) 06/24/2023    CO2 27 06/24/2023    BUN 10 06/24/2023    CREATININE 0 87 06/24/2023    CALCIUM 8 4 06/24/2023    EGFR 76 06/24/2023         VTE Pharmacologic Prophylaxis: Heparin        Nettie Sanders MD  6/25/2023 12:35 AM Wartpeel Counseling:  I discussed with the patient the risks of Wartpeel including but not limited to erythema, scaling, itching, weeping, crusting, and pain.

## 2023-07-21 ENCOUNTER — APPOINTMENT (OUTPATIENT)
Dept: LAB | Facility: HOSPITAL | Age: 53
End: 2023-07-21
Payer: COMMERCIAL

## 2023-07-21 ENCOUNTER — PATIENT OUTREACH (OUTPATIENT)
Dept: HEMATOLOGY ONCOLOGY | Facility: CLINIC | Age: 53
End: 2023-07-21

## 2023-07-21 DIAGNOSIS — Z00.00 PREVENTATIVE HEALTH CARE: ICD-10-CM

## 2023-07-21 LAB
ALBUMIN SERPL BCP-MCNC: 3.9 G/DL (ref 3.5–5)
ALP SERPL-CCNC: 63 U/L (ref 46–116)
ALT SERPL W P-5'-P-CCNC: 46 U/L (ref 12–78)
ANION GAP SERPL CALCULATED.3IONS-SCNC: 3 MMOL/L
AST SERPL W P-5'-P-CCNC: 27 U/L (ref 5–45)
BASOPHILS # BLD AUTO: 0.05 THOUSANDS/ÂΜL (ref 0–0.1)
BASOPHILS NFR BLD AUTO: 1 % (ref 0–1)
BILIRUB SERPL-MCNC: 0.42 MG/DL (ref 0.2–1)
BUN SERPL-MCNC: 20 MG/DL (ref 5–25)
CALCIUM SERPL-MCNC: 8.9 MG/DL (ref 8.3–10.1)
CEA SERPL-MCNC: 0.8 NG/ML (ref 0–3)
CHLORIDE SERPL-SCNC: 106 MMOL/L (ref 96–108)
CO2 SERPL-SCNC: 28 MMOL/L (ref 21–32)
CREAT SERPL-MCNC: 0.97 MG/DL (ref 0.6–1.3)
EOSINOPHIL # BLD AUTO: 0.22 THOUSAND/ÂΜL (ref 0–0.61)
EOSINOPHIL NFR BLD AUTO: 4 % (ref 0–6)
ERYTHROCYTE [DISTWIDTH] IN BLOOD BY AUTOMATED COUNT: 12.6 % (ref 11.6–15.1)
GFR SERPL CREATININE-BSD FRML MDRD: 66 ML/MIN/1.73SQ M
GLUCOSE P FAST SERPL-MCNC: 103 MG/DL (ref 65–99)
HCT VFR BLD AUTO: 39.8 % (ref 34.8–46.1)
HGB BLD-MCNC: 13.3 G/DL (ref 11.5–15.4)
IMM GRANULOCYTES # BLD AUTO: 0.02 THOUSAND/UL (ref 0–0.2)
IMM GRANULOCYTES NFR BLD AUTO: 0 % (ref 0–2)
LYMPHOCYTES # BLD AUTO: 2.33 THOUSANDS/ÂΜL (ref 0.6–4.47)
LYMPHOCYTES NFR BLD AUTO: 48 % (ref 14–44)
MCH RBC QN AUTO: 30.6 PG (ref 26.8–34.3)
MCHC RBC AUTO-ENTMCNC: 33.4 G/DL (ref 31.4–37.4)
MCV RBC AUTO: 92 FL (ref 82–98)
MONOCYTES # BLD AUTO: 0.4 THOUSAND/ÂΜL (ref 0.17–1.22)
MONOCYTES NFR BLD AUTO: 8 % (ref 4–12)
NEUTROPHILS # BLD AUTO: 1.94 THOUSANDS/ÂΜL (ref 1.85–7.62)
NEUTS SEG NFR BLD AUTO: 39 % (ref 43–75)
NRBC BLD AUTO-RTO: 0 /100 WBCS
PLATELET # BLD AUTO: 240 THOUSANDS/UL (ref 149–390)
PMV BLD AUTO: 11.3 FL (ref 8.9–12.7)
POTASSIUM SERPL-SCNC: 4.2 MMOL/L (ref 3.5–5.3)
PROT SERPL-MCNC: 7.3 G/DL (ref 6.4–8.4)
RBC # BLD AUTO: 4.35 MILLION/UL (ref 3.81–5.12)
SODIUM SERPL-SCNC: 137 MMOL/L (ref 135–147)
WBC # BLD AUTO: 4.96 THOUSAND/UL (ref 4.31–10.16)

## 2023-07-21 PROCEDURE — 80053 COMPREHEN METABOLIC PANEL: CPT

## 2023-07-21 PROCEDURE — 36415 COLL VENOUS BLD VENIPUNCTURE: CPT

## 2023-07-21 PROCEDURE — 85025 COMPLETE CBC W/AUTO DIFF WBC: CPT

## 2023-07-21 PROCEDURE — 82378 CARCINOEMBRYONIC ANTIGEN: CPT

## 2023-07-21 NOTE — PROGRESS NOTES
Rtrn call to the pt, she left me a VM asking about her labs and if this would halt her from rcving her chemo tx, I rvwd the labs w her and said most likely not diamante since the values were not too far off from the normal range, she reports having "Joint issues-soreness"- after sx the pt said that she was on NSAIDs and that she felt great but since she has been off them she has the soreness and she is not sure if there is something else going on w her. I told her I would make Dr. Lara Mary aware of this to rvw w her. She thanked me.

## 2023-07-24 ENCOUNTER — PATIENT OUTREACH (OUTPATIENT)
Dept: HEMATOLOGY ONCOLOGY | Facility: CLINIC | Age: 53
End: 2023-07-24

## 2023-07-24 DIAGNOSIS — C18.9 COLON ADENOCARCINOMA (HCC): ICD-10-CM

## 2023-07-24 RX ORDER — CAPECITABINE 500 MG/1
TABLET, FILM COATED ORAL
Qty: 56 TABLET | Refills: 0 | Status: SHIPPED | OUTPATIENT
Start: 2023-07-24

## 2023-07-24 RX ORDER — CAPECITABINE 150 MG/1
TABLET, FILM COATED ORAL
Qty: 28 TABLET | Refills: 0 | Status: SHIPPED | OUTPATIENT
Start: 2023-07-24

## 2023-07-24 NOTE — PROGRESS NOTES
Phone outreach to the pt f/u from our last conversation, I let her know that the physician stated her labs are normal and told her she did not need to worry about the noted "abnormals", I explained that he was going to further discuss at his next appt w her on 8/7. She said ok and she just did not like to see that not all was "green" but she understood the labs are good and not concerning. She thanked me for calling her back today to let her know.

## 2023-07-27 DIAGNOSIS — C18.9 COLON ADENOCARCINOMA (HCC): Primary | ICD-10-CM

## 2023-07-28 ENCOUNTER — TELEPHONE (OUTPATIENT)
Dept: INFUSION CENTER | Facility: HOSPITAL | Age: 53
End: 2023-07-28

## 2023-07-28 ENCOUNTER — TELEPHONE (OUTPATIENT)
Dept: HEMATOLOGY ONCOLOGY | Facility: CLINIC | Age: 53
End: 2023-07-28

## 2023-07-28 NOTE — TELEPHONE ENCOUNTER
Initial Outreach - New Start Xeloda    Consent uploaded: yes    Medication provided by: [facility]     Confirmed drug, dose and schedule: 1150mg twice a day ([id number] and [id number] twice a day)    Start date: 7/31    Adherence: no barriers identified on this call    Pt instructed not to cut/crush and take pill whole: yes    Pt instructed not to double up if a dose is missed: yes    Med storage, handling and disposal discussed: yes    Pt instructed to notify office prior to any procedures: yes    Confirmed pt reviewed med teaching sheet: yes    Side effect discussion: yes, pt aware it's a good idea to have Imodium on hand. Has Zofran at home.      Pt knows how to refill med: deferred    Pt has contact info for office RN, Katie and Oral Chemo Coordinator: yes    Pt aware of follow up appt/labs/testing: labs prior to [facility], f/u 8/7 with Dr. Daly Patel for next call: approx 1 week after starting med

## 2023-07-28 NOTE — TELEPHONE ENCOUNTER
Pt scheduled for D1C1 chemo Monday. Called pt to review location of infusion center, policies/procedures, and expected appt length. All questions answered. Pt provided with phone number to call back if any new questions/concerns arise.

## 2023-07-29 NOTE — PROGRESS NOTES
Hematology/Oncology Outpatient Office Note    Date of Service: 2023    [facility]  [phone]    Reason for Consultation:   Chief Complaint   Patient presents with   • Follow-up       Cancer Pathologic Stage at diagnosis: IIIA    Referral Physician: No ref. provider found    Primary Care Physician:  Adelita Addison DO     Nickname: Karie Parkinson    Spouse: Christian Ramon ECO     Today's ECO    Goals and Barriers:  Current Goal: Minimize effects of disease burden, extend life. Barriers to accomplishing this: None    Patient's Capacity to Self Care:  Patient is able to self care      ASSESSMENT & PLAN      Diagnosis ICD-10-CM Associated Orders   1. Colon adenocarcinoma ([address])  C18.9             This is a 47 y.o. c PMHx notable for sinus congestion, being seen in consultation for early stage colon adenocarcinoma      Discussion of decision making  • Oncology history updated, accordingly, during this visit  • Goals of care/patient communication  o I discussed with the patient the clinical course leading up to their cancer diagnosis. I reviewed relevant office notes, imaging reports and pathology result as well.  o I told the patient that this is a case of curable disease and what this means. We discussed that the goal of anti-cancer therapy is to provide best quality of life, extend overall survival, and progression free survival as shown in clinical trials. We also discussed that there might be a point when the cancer will no longer respond to this anti-neoplastic therapy.   o I explained the risks/benefits of the proposed cancer therapy: CAPOX for 3 months and after discussion including understanding risks of possible life-threatening complications and therapy-related malignancy development, informed consent for blood products and treatment has been signed and obtained.   • TNM/Staging At Diagnosis  Cancer Staging  Colon adenocarcinoma ([address])  Staging form: Colon and Rectum, AJCC 8th Edition  - Pathologic: Stage IIIA (pT1, pN1, cM0) - Signed by Larry Henderson MD on 7/6/2023  • Disease Features/Tumor Markers/Genetics  o Tumor Marker: 7/21/2023 CEA 0.8  o Notable Path Features: MMR-proficient, 1/19 LN involved  • Treatment: CAPOX  • Other Supportive care:   • Treatment Team Members  o Surgeon: Dr. Charla Farah  o Palliative  • Labs: 7/21/2023: Hgb 13.3, WBC 5k, plt 240k  • Diagnostics  6/11/2023 CT CAP w/c: No evidence for metastatic disease in the chest, abdomen, or pelvis      This is a patient with Stage III L sided colon cancer with only one high risk features (LN). The patient is MMR-proficient. From an overall performance status basis, I believe the patient can tolerate systemic treatment. Prognosis is more hopeful as we are dealing with an originating left sided. Thoughts on approach to systemic therapy in the first line and subsequent lines of therapy: If no adjuvant therapy or if Xeloda/5-FU done: The next stage in the evolution of adjuvant therapy involved the evaluation of 5-FU with leucovorin in several key trials. The NSABP C-03 study72 reported a 3-year disease-free survival (DFS) rate of 73% for patients receiving 5-FU/leucovorin, compared with a rate of 64% for those who received a combination of the alkylating nitrosourea lomustine, the alkaloid vincristine, and 5-FU (MOF; P = .0004). The IMPACT (International Multicenter Pooled Analysis of Colorectal Cancer Trials)73 pooled data from 3 randomized trials that investigated high-dose 5-FU/leucovorin compared with no adjuvant therapy. 5-FU/leucovorin reduced mortality by 22% (P = .029) and CRC events by 35% (P < .0001) compared with no adjuvant therapy    In the X-ACT (Xeloda in Adjuvant Colon Cancer Therapy) trial,78 patients were randomized to capecitabine or the [facility] regimen.  There were no statistically significant differences between the 2 arms in terms of DFS (64.2% vs. 60.6%, respectively; P = NS) or OS (81.3% and 77.6%; P = .05). However, capecitabine was associated with significantly fewer adverse events than the [facility] regimen (P < .001). The NSABP C-06 study,79 which compared tegafur with leucovorin versus the [facility] regimen, reported that 5-year DFS (68.2% vs. 67.0%, respectively; P = NS) and OS (78.7% vs. 78.5%; P = NS) were similar for the 2 treatments. IDEA trial  We went over the IDEA trial which showed that 3 months of CAPOX was non-inferior to 6 months of mFOLFOX6 in all but high risk Stage 3 colon cancer. The absolute 0.4% difference in 5-year overall survival was noted. In high risk stage III colon cancers like her (T4), there would be nearly 1 more person out of a 100 alive at 5 years than otherwise would have . Grade ? 2 neurotoxicity during active therapy and in the first month after stopping study treatment occurred in 16.0% of patients in the 3-month group vs 44.5% of those in the 6-month group (CAPOX vs mFOLFOX6). Based on  ACCENT/IDEA pooled analysis of 11 trials, Raina et al suggested that early oxaliplatin discontinuation after 50% of the intended treatment was given (usually reason of Grade 1-2 neuropathy) did not impact 3 year DFS or 5 year OS (note that these numbers were negatively affected when <50% of intended oxaliplatin was given). 2.5% of pts had grade 3/4 neuropathy after 3 months of chemotherapy versus 15.9% who had 6 months. As a result, it is reasonable to discontinue oxaliplatin after 3 months for most patients      Survival Rates    In [facility], patients with stage II or III disease were randomly assigned to receive adjuvant FOLFOX4 or 5-FU/leucovorin.  After a median follow-up of 9.5 years, 10-year overall survival among all 2,246 patients was 71.7% in the FOLFOX4 group vs 67.1% in the 5-FU/leucovorin group (hazard ratio [HR] = 0.85, P = [place] 043), 78.4% vs 79.5% in those with stage II disease (HR = 1.00, P = .980), and 67.1% vs 59.0% in those with stage III disease (HR = 0.80, P = .016). PO option: Xeloda 7671-2852 mg/m2 BID for 12 weeks (14 days on and 7 days off). Can start at 500 mg/m2 initially to work-up along with Oxaliplatin 135 mg/m2 q3 weeks        Discussion of disease response monitoring: We discussed with the patient at length the role of imaging and potential blood monitoring of burden of disease. In addition, we discussed at length the role of increasingly recognized peripheral blood monitoring of disease burden. Cell-free Circulating Tumor DNA Variant Allele Frequency has been associated with survival in metastatic cancer, albeit mainly determined in retrospective studies to date (https://pubmed.ncbi.nlm.nih.gov/59922807/). The patient had all of their questions and concerns answered and we will elect to pursue this. Restaging CT Chest/abdomen/pelvis (CAP) scans will be planned q6 months for 5 years. H&P q3-6 months in the first 2 years and then q6 months thereafter until year 5. CEA q6 months. C-scope 12 months after surgery (3-6 months if there was a pre-obstructing lesion prior to surgery not allowing c-scope to be done)      Discussion of decision making    I personally reviewed the following lab results, the image studies, pathology, other specialty/physicians consult notes and recommendations, and outside medical records. I had a lengthy discussion with the patient and shared the work-up findings. We discussed the diagnosis and management plan as below.  I spent 41 minutes reviewing the records (labs, clinician notes, outside records, medical history, ordering medicine/tests/procedures, interpreting the imaging/labs previously done) and coordination of care as well as direct time with the patient today, of which greater than 50% of the time was spent in counseling and coordination of care with the patient/family. · Plan/Labs  · CAPOX (Capecetaibine 500mg/m2 for C1, 1000 mg/m2 for C2) to be given q3 weeks for 4 cycles, C2 coming up (CEA added to this, then will be due 12/2023)  · Compazine 10mg prn (Zofran didn't help her)  · Restaging CT to be planned for 12/2023 and have ordered it now  · Signatera surveillance being ordered 5 times until 5/2024  · F/u Surg-Onc for post surgery care, will be due for 1 year C-scope 6/2024        Follow Up: q3 weeks while on systemic therapy x 4 visits needed    All questions were answered to the patient's satisfaction during this encounter. The patient knows the contact information for our office and knows to reach out for any relevant concerns related to this encounter. They are to call for any temperature 100.4 or higher, new symptoms including but not restricted to shaking chills, decreased appetite, nausea, vomiting, diarrhea, increased fatigue, shortness of breath or chest pain, confusion, and not feeling the strength to come to the clinic. For all other listed problems and medical diagnosis in their chart - they are managed by PCP and/or other specialists, which the patient acknowledges. Thank you very much for your consultation and making us a part of this patient's care. We are continuing to follow closely with you. Please do not hesitate to reach out to me with any additional questions or concerns. Hortencia Rosenberg MD  Hematology & Medical Oncology Staff Physician             Disclaimer: This document was prepared using [employer] technology. If a word or phrase is confusing, or does not make sense, this is likely due to recognition error which was not discovered during this clinician's review. If you believe an error has occurred, please contact me through [facility] line for elias? cation. ONCOLOGY HISTORY OF PRESENT ILLNESS        Oncology History   Colon adenocarcinoma ([address])   5/23/2023 Biopsy    A.  Transverse colon polyp cold snare, polypectomy:  -   Fragments of sessile serrated adenoma (deeper levels examined). -   Separate scant fragments of tubular adenoma, likely tissue contaminant. B. Sigmoid colon polyp hot snare, polypectomy:  -   Invasive adenocarcinoma, moderately differentiated, arising in fragments of traditional serrated adenoma. -   MMR (MLH1, MSH2, MSH6 and PMS2) studies by immunohistochemistry on B2 are pending.  -   See comment and synoptic report. 6/8/2023 Initial Diagnosis    Colon adenocarcinoma ([address])     6/23/2023 Surgery    A. Sigmoid colon, sigmoidectomy:  -   Sigmoid colon with tattoo pigment and multinucleated giant cell reaction.  -   Negative for residual adenoma and carcinoma. -   Resection margins are negative for adenoma and carcinoma. -   Metastatic adenocarcinoma involving one of 19 lymph nodes (1/19, pN1a). 7/6/2023 -  Cancer Staged    Staging form: Colon and Rectum, AJCC 8th Edition  - Pathologic: Stage IIIA (pT1, pN1, cM0) - Signed by Segundo Funez MD on 7/6/2023 7/31/2023 -  Chemotherapy    alteplase (CATHFLO), 2 mg, Intracatheter, Every 1 Minute as needed, 1 of 4 cycles  oxaliplatin (ELOXATIN) chemo infusion, 245.7 mg (135 % of original dose 100 mg/m2), Intravenous, Once, 1 of 4 cycles  Dose modification: 135 mg/m2 (original dose 100 mg/m2, Cycle 1, Reason: Dose modified as per discussion with consulting physician)  Administration: 250 mg (7/31/2023)       Status post robotic sigmoidectomy [6/26/2023-Dr. Locke].  Mismatch repair protein intact. Her cancer was identified by routine colonoscopy [3/24/23].  Results of the colonoscopy with biopsy of one of the 2 polyps (sigmoid) that were removed showed invasive adenocarcinoma, moderately differentiated, arising in fragments of traditional serrated adenoma.  Patient underwent subsequent sigmoid colectomy which showed metastatic adenocarcinoma involving one of 19 lymph nodes (1/19, pN1a).    Patient denies any complications.  Denies any blood in stool or urine    SUBJECTIVE  (INTERVAL HISTORY)      Clotting History None   Bleeding History None   Cancer History Colon   Family Cancer History pGrandfather (colon), mGrandmother (breast), mAunt (breast), mAunt (uterine)   H/O Blood/Plt Transfusion None   Tobacco/etoh/drug abuse 15-pack-year smoking history [quit 2012]    Hx COVID19 Infection and Vaccine Status January 2022 COVID, vaccinated    Cancer Screening history up-to-date,  Next colonoscopy due 6/2024   Occupation retired, was working for the [employer] nausea/upset stomach last week but this is a lot better. NO persistent neuropathy. I have reviewed the relevant past medical, surgical, social and family history. I have also reviewed allergies and medications for this patient. Review of Systems    Baseline weight: 155 lbs    Denies F/C, N/V, SOB, CP, LH, HA, rash, itching, gen weakness, melena, hematuria, hematochezia, falls, diarrhea, or constipation       A 10-point review of system was performed, pertinent positive and negative were detailed as above. Otherwise, the 10-point review of system was negative.       Past Medical History:   Diagnosis Date   • Abnormal Pap smear of cervix    • Sinus congestion     chronic   • Varicella        Past Surgical History:   Procedure Laterality Date   • BREAST EXCISIONAL BIOPSY Right 2008    benign   • FOOT NEUROMA SURGERY Left    • FOOT SURGERY     • HEMICOLOECTOMY W/ ANASTOMOSIS N/A 6/23/2023    Procedure: LAPAROSCOPIC SIGMOID COLECTOMY WITH ROBOTICS, FLEXIBLE PROCTOSCOPY;  Surgeon: Ban Lyons MD;  Location: [facility];  Service: Surgical Oncology   • US BREAST NEEDLE LOC RIGHT Right 10/08/2008       Family History   Problem Relation Age of Onset   • Diabetes Mother    • No Known Problems Father    • Breast cancer Maternal Grandmother         50-60's   • Stroke Maternal Grandmother    • Diabetes Maternal Grandmother    • Cancer Maternal Grandfather    • No Known Problems Paternal Grandmother    • No Known Problems Brother    • No Known Problems Half-Sister    • Breast cancer Maternal Aunt         68-70   • Endometrial cancer Maternal Aunt         maybe 40's   • No Known Problems Maternal Aunt    • Colon cancer Neg Hx    • Ovarian cancer Neg Hx        Social History     Socioeconomic History   • Marital status: /Civil Union     Spouse name: Not on file   • Number of children: Not on file   • Years of education: Not on file   • Highest education level: Not on file   Occupational History   • Not on file   Tobacco Use   • Smoking status: Former     Types: Cigarettes     Quit date:      Years since quittin.6   • Smokeless tobacco: Never   Vaping Use   • Vaping Use: Never used   Substance and Sexual Activity   • Alcohol use: Yes     Comment: social   • Drug use: Never   • Sexual activity: Yes     Partners: Male     Birth control/protection: I.U.D. Comment: no new partner in past year   Other Topics Concern   • Not on file   Social History Narrative   • Not on file     Social Determinants of Health     Financial Resource Strain: Not on file   Food Insecurity: Not on file   Transportation Needs: Not on file   Physical Activity: Not on file   Stress: Not on file   Social Connections: Not on file   Intimate Partner Violence: Not on file   Housing Stability: Not on file       No Known Allergies    Current Outpatient Medications   Medication Sig Dispense Refill   • capecitabine (XELODA) 150 MG tablet TAKE 1 TABLET BY MOUTH TWICE DAILY FOR 14 DAYS ON, FOLLOWED BY 7 DAYS OFF 28 tablet 0   • capecitabine (XELODA) 500 MG tablet TAKE 2 TABLETS BY MOUTH 2 TIMES A DAY ON DAYS 1 TO 14 OF A 21 DAY CYCLE.  56 tablet 0   • cholecalciferol (VITAMIN D3) 1,000 units tablet Take 2,000 Units by mouth daily     • Levonorgestrel (MIRENA) 20 MCG/DAY IUD 1 each by Intrauterine route once     • OLANZapine (ZyPREXA) 5 mg tablet Take 1 tablet (5 mg total) by mouth daily at bedtime 30 tablet 0   • ondansetron (ZOFRAN-ODT) 8 mg disintegrating tablet Take 1 tablet (8 mg total) by mouth every 8 (eight) hours as needed for nausea or vomiting 20 tablet 3   • prochlorperazine (COMPAZINE) 10 mg tablet Take 1 tablet (10 mg total) by mouth every 8 (eight) hours as needed for nausea or vomiting 30 tablet 0   • celecoxib (CeleBREX) 200 mg capsule Take 1 capsule (200 mg total) by mouth daily for 7 days (Patient not taking: Reported on 7/6/2023) 7 capsule 0   • enoxaparin (Lovenox) 40 mg/0.4 mL Inject 0.4 mL (40 mg total) under the skin in the morning for 25 days (Patient not taking: Reported on 8/7/2023) 10 mL 0   • gabapentin (NEURONTIN) 300 mg capsule Take 1 capsule (300 mg total) by mouth 3 (three) times a day for 7 days (Patient not taking: Reported on 7/6/2023) 21 capsule 0     No current facility-administered medications for this visit. (Not in a hospital admission)      Objective:     24 Hour Vitals Assessment:     Vitals:    08/07/23 1341   BP: 134/80   Pulse: 68   Resp: 18   Temp: (!) 97.3 °F (36.3 °C)   SpO2: 99%       PHYSICIAN EXAM:    General: Appearance: alert, cooperative, no distress. HEENT: Normocephalic, atraumatic. No scleral icterus. conjunctivae clear. EOMI. Chest: No tenderness to palpation. No open wound noted. Lungs: Clear to auscultation bilaterally, Respirations unlabored. Cardiac: Regular rate and rhythm, +S1and S2  Abdomen: Soft, non-tender, non-distended. Bowel sounds are normal.   Extremities:  No edema, cyanosis, clubbing. Skin: Skin color, turgor are normal. No rashes. Lymphatics: no palpable supra-cervical, axillary, or inguinal adenopathy  Neurologic: Awake, Alert, and oriented, no gross focal deficits noted b/l. DATA REVIEW:    Pathology Result:    Final Diagnosis   Date Value Ref Range Status   06/23/2023   Final    A.  Sigmoid colon, sigmoidectomy:  -   Sigmoid colon with tattoo pigment and multinucleated giant cell reaction.  -   Negative for residual adenoma and carcinoma. -   Resection margins are negative for adenoma and carcinoma. -   Metastatic adenocarcinoma involving one of 19 lymph nodes (1/19, pN1a). Interpretation performed at [place] [address] [facility]      05/23/2023   Final    A. Transverse colon polyp cold snare, polypectomy:  -   Fragments of sessile serrated adenoma (deeper levels examined). -   Separate scant fragments of tubular adenoma, likely tissue contaminant. B. Sigmoid colon polyp hot snare, polypectomy:  -   Invasive adenocarcinoma, moderately differentiated, arising in fragments of traditional serrated adenoma. -   MMR (MLH1, MSH2, MSH6 and PMS2) studies by immunohistochemistry on B2 are pending.  -   See comment and synoptic report. Comment:   Part B) CD31/CKC immunostain on B3 is negative for lymphovascular invasion. Select slide B2 is reviewed by Dr. Leona Smith who concurs with the diagnosis. Diagnosis is communicated via [employer] to Dr. Marylin Salomon on 6/1/2023 at [address] Interpretation performed at [facility] [address]           Image Results:   Image result are reviewed and documented in Hematology/Oncology history    CT chest abdomen pelvis w contrast  Narrative: CT CHEST, ABDOMEN AND PELVIS WITH IV CONTRAST    INDICATION:   C18.9: Malignant neoplasm of colon, unspecified. COMPARISON:  None. TECHNIQUE: CT examination of the chest, abdomen and pelvis was performed. Multiplanar 2D reformatted images were created from the source data. This examination, like all CT scans performed in the [facility], was performed utilizing techniques to minimize radiation dose exposure, including the use of iterative reconstruction and automated exposure control.  Radiation dose length   product (DLP) for this visit:  717.16 mGy-cm    IV Contrast:  80 mL of iohexol (OMNIPAQUE)  Enteric Contrast: Enteric contrast was not administered. FINDINGS:    CHEST    LUNGS: No worrisome pulmonary nodule. There is no tracheal or endobronchial lesion. PLEURA:  Unremarkable. HEART/GREAT VESSELS: Heart is unremarkable for patient's age. No thoracic aortic aneurysm. MEDIASTINUM AND BRYAN:  Unremarkable. CHEST WALL AND LOWER NECK:  Unremarkable. ABDOMEN    LIVER/BILIARY TREE:  Unremarkable. GALLBLADDER:  No calcified gallstones. No pericholecystic inflammatory change. SPLEEN:  Unremarkable. PANCREAS:  Unremarkable. ADRENAL GLANDS:  Unremarkable. KIDNEYS/URETERS: One or more sharply circumscribed subcentimeter renal hypodensities are noted. These lesions are too small to accurately characterize, but are statistically most likely to represent benign cortical renal cyst(s). According to the   guidelines published in the [facility] Paper of the ACR Incidental Findings Committee (Radiology 2010), no further workup of these lesions is recommended. No hydronephrosis. STOMACH AND BOWEL:  Unremarkable. APPENDIX:  No findings to suggest appendicitis. ABDOMINOPELVIC CAVITY:  No ascites. No pneumoperitoneum. No lymphadenopathy. VESSELS:  Unremarkable for patient's age. PELVIS    REPRODUCTIVE ORGANS: There is an intrauterine device in the uterus. URINARY BLADDER:  Unremarkable. ABDOMINAL WALL/INGUINAL REGIONS:  Unremarkable. OSSEOUS STRUCTURES:  No acute fracture or destructive osseous lesion. Impression: No evidence for metastatic disease in the chest, abdomen, or pelvis. Workstation performed: [id number]      LABS:  Lab data are reviewed and documented in HemOn history.        Lab Results   Component Value Date    HGB 13.3 07/21/2023    HCT 39.8 07/21/2023    MCV 92 07/21/2023     07/21/2023    WBC 4.96 07/21/2023    NRBC 0 07/21/2023     Lab Results   Component Value Date    K 4.2 07/21/2023     07/21/2023    CO2 28 07/21/2023    BUN 20 07/21/2023    CREATININE 0.97 07/21/2023    GLUF 103 (H) 07/21/2023    CALCIUM 8.9 07/21/2023    AST 27 07/21/2023    ALT 46 07/21/2023    ALKPHOS 63 07/21/2023    EGFR 66 07/21/2023       No results found for: "IRON", "TIBC", "FERRITIN"    No results found for: "Darrold Caller"    No results for input(s): "WBC", "CREAT", "PLT" in the last 72 hours.     By:  Stevie Nelson MD, 8/7/2023, 1:46 PM

## 2023-07-31 ENCOUNTER — TELEPHONE (OUTPATIENT)
Dept: GENETICS | Facility: CLINIC | Age: 53
End: 2023-07-31

## 2023-07-31 ENCOUNTER — HOSPITAL ENCOUNTER (OUTPATIENT)
Dept: INFUSION CENTER | Facility: HOSPITAL | Age: 53
Discharge: HOME | End: 2023-07-31
Attending: INTERNAL MEDICINE
Payer: COMMERCIAL

## 2023-07-31 VITALS
TEMPERATURE: 97.9 F | BODY MASS INDEX: 24.96 KG/M2 | RESPIRATION RATE: 18 BRPM | HEART RATE: 65 BPM | DIASTOLIC BLOOD PRESSURE: 80 MMHG | WEIGHT: 159 LBS | OXYGEN SATURATION: 98 % | HEIGHT: 67 IN | SYSTOLIC BLOOD PRESSURE: 150 MMHG

## 2023-07-31 DIAGNOSIS — C18.9 COLON ADENOCARCINOMA (HCC): Primary | ICD-10-CM

## 2023-07-31 RX ORDER — DEXTROSE MONOHYDRATE 50 MG/ML
20 INJECTION, SOLUTION INTRAVENOUS ONCE
Status: COMPLETED | OUTPATIENT
Start: 2023-07-31 | End: 2023-07-31

## 2023-07-31 RX ORDER — SODIUM CHLORIDE 9 MG/ML
20 INJECTION, SOLUTION INTRAVENOUS ONCE
Status: COMPLETED | OUTPATIENT
Start: 2023-07-31 | End: 2023-07-31

## 2023-07-31 RX ADMIN — DEXTROSE 20 ML/HR: 5 SOLUTION INTRAVENOUS at 09:32

## 2023-07-31 RX ADMIN — DEXAMETHASONE SODIUM PHOSPHATE: 10 INJECTION, SOLUTION INTRAMUSCULAR; INTRAVENOUS at 09:03

## 2023-07-31 RX ADMIN — OXALIPLATIN 250 MG: 5 INJECTION, SOLUTION INTRAVENOUS at 09:40

## 2023-07-31 RX ADMIN — SODIUM CHLORIDE 20 ML/HR: 0.9 INJECTION, SOLUTION INTRAVENOUS at 08:43

## 2023-07-31 NOTE — PROGRESS NOTES
Patient completed D1C1 chemotherapy infusion with no adverse reactions. Utilized cryotherapy gloves and socks and chewed on ice throughout infusion as ok'd by Dr. Byrne Figures. AVS provided, PIV site removed, dressing CD&I. Left unit ambulatory with steady gait accompanied by .

## 2023-07-31 NOTE — PROGRESS NOTES
Received call from infusion RN in regards to patient having ice therapy gloves and socks to wear prior and during Oxaliplatin infusion. Reviewed with infusion RN that Dr. Nisa Cavazos was aware of patient eating ice during treatment only. RN reviewed with Dr. Nisa Cavazos, patient is looking to utilize ice gloves/socks and eat ice as well prior to and during oxaliplatin infusion. Post review with Dr. Nisa Cavazos of patient wishes, confirmed Dr. Nisa Cavazos is okay with this. Call out to patient and reviewed that patient may become more sensitive to cold, but Dr. Nisa Cavazos stated that it will not change efficiency of the Oxaliplatin. Message sent to SEDRICK Gupta at [facility] infusion. Ok to treat added.

## 2023-08-03 ENCOUNTER — DOCUMENTATION (OUTPATIENT)
Dept: GENETICS | Facility: CLINIC | Age: 53
End: 2023-08-03

## 2023-08-03 ENCOUNTER — CLINICAL SUPPORT (OUTPATIENT)
Dept: GENETICS | Facility: CLINIC | Age: 53
End: 2023-08-03
Payer: COMMERCIAL

## 2023-08-03 DIAGNOSIS — T45.1X5A CHEMOTHERAPY INDUCED NAUSEA AND VOMITING: Primary | ICD-10-CM

## 2023-08-03 DIAGNOSIS — C18.9 COLON ADENOCARCINOMA (HCC): Primary | ICD-10-CM

## 2023-08-03 DIAGNOSIS — R11.2 CHEMOTHERAPY INDUCED NAUSEA AND VOMITING: Primary | ICD-10-CM

## 2023-08-03 PROCEDURE — 36415 COLL VENOUS BLD VENIPUNCTURE: CPT

## 2023-08-03 RX ORDER — OLANZAPINE 5 MG/1
5 TABLET ORAL
Qty: 30 TABLET | Refills: 0 | Status: SHIPPED | OUTPATIENT
Start: 2023-08-03

## 2023-08-03 RX ORDER — PROCHLORPERAZINE MALEATE 10 MG
10 TABLET ORAL EVERY 8 HOURS PRN
Qty: 30 TABLET | Refills: 0 | Status: SHIPPED | OUTPATIENT
Start: 2023-08-03

## 2023-08-03 NOTE — PROGRESS NOTES
Pre-Test Genetic Counseling Consult Note    Patient Name: Alex Wolf   /Age: [birth year]/80 y.o. Referring Provider: Rakan Apple MD     Date of Service: 8/3/2023  Genetic Counselor: Harish Hermosillo MS, [facility]  Interpretation Services: None  [facility]    Cheryl Melendez was referred to the [address] [place], [place] [address] Floor and Genetic Assessment Program due to her personal history of colon cancer identified on her first colonoscopy. she presents today to discuss the possibility of a hereditary cancer syndrome, options for genetic testing, and implications for her and her family. Cancer History and Treatment:   Personal History:   Colon adenocarcinoma ([facility])   2023 Biopsy     A. Transverse colon polyp cold snare, polypectomy:  -   Fragments of sessile serrated adenoma (deeper levels examined).     -   Separate scant fragments of tubular adenoma, likely tissue contaminant.       B. Sigmoid colon polyp hot snare, polypectomy:  -   Invasive adenocarcinoma, moderately differentiated, arising in fragments of traditional serrated adenoma. -   See comment and synoptic report.      2023 Initial Diagnosis     Colon adenocarcinoma [facility])      2023 Surgery     A. Sigmoid colon, sigmoidectomy:  -   Sigmoid colon with tattoo pigment and multinucleated giant cell reaction. -   Negative for residual adenoma and carcinoma. -   Resection margins are negative for adenoma and carcinoma. -   Metastatic adenocarcinoma involving one of 19 lymph nodes (, pN1a).       2023 -  Cancer Staged     Staging form: Colon and Rectum, AJCC 8th Edition  - Pathologic: Stage IIIA (pT1, pN1, cM0) - Signed by Lynnette Maya MD on 2023     RESULTS:  Antibody          Clone               Description                           Results  MJ                   Mismatch repair protein       Intact nuclear expression  AWO9              [id number]      WZRRDEQC repair protein       Intact nuclear expression  LEZ9              82                    Mismatch repair protein       Intact nuclear expression  DZU5              [id number]         Mismatch repair protein       Intact nuclear expression    Screening Hx:   Breast:  Breast Imaging: Mammogram (2022): IMPRESSION:  No mammographic evidence of malignancy. Breast Biopsy: Right breast in , benign   Breast Density: Extremely dense    Gynecologic:  Ovaries and Uterus intact     Skin:  Sees derm as needed     Other screening: n/a    Reproductive History  Age at menarche: 5  Age at first live birth: Nulliparous  Menopause: Perimenopausal  Hormone replacement: none     Medical and Surgical History  Pertinent surgical history:   Past Surgical History:   Procedure Laterality Date   • BREAST EXCISIONAL BIOPSY Right     benign   • FOOT NEUROMA SURGERY Left    • FOOT SURGERY     • HEMICOLOECTOMY W/ ANASTOMOSIS N/A 2023    Procedure: LAPAROSCOPIC SIGMOID COLECTOMY WITH ROBOTICS, FLEXIBLE PROCTOSCOPY;  Surgeon: Larry Henderson MD;  [facility];  Service: Surgical Oncology   • US BREAST NEEDLE LOC RIGHT Right 10/08/2008      Pertinent medical history:  Past Medical History:   Diagnosis Date   • Abnormal Pap smear of cervix    • Sinus congestion     chronic   • Varicella          Other History:  Height:   Ht Readings from Last 1 Encounters:   23 5' 7.01" (1.702 m)     Weight:   Wt Readings from Last 1 Encounters:   23 72.1 kg (159 lb)       Relevant Family History   Patient reports non-Ashkenazi Pentec[employer]alism ancestry.      Maternal Family History:  Grandmother: breast cancer diagnosed at 61, "hormone related" (d.71 y/o)   Half-aunt: uterine cancer diagnosed at 40 (currently 63 y/o)  Half-aunt: breast cancer diagnosed at 72 (currently 77 y/o)    Paternal Family History:  Uncle: lung cancer, smoker/ETOH ()  Grandfather: colon cancer, no information (d.60s)     Please refer to the scanned pedigree in the Media Tab for a complete family history     *All history is reported as provided by the patient; records are not available for review, except where indicated. Assessment:  We discussed sporadic, familial and hereditary cancer. We reviewed that only 5-10% of cancers are considered hereditary. Cancers such as lung cancer, cervical cancer, testicular cancer, and liver cancer very rarely have a hereditary etiology, and we cannot test for a genetic predisposition for these cancers at this time. We also discussed the many factors that influence our risk for cancer such as age, environmental exposures, lifestyle choices and family history. We reviewed the indications suggestive of a hereditary predisposition to cancer. We reviewed the results of the immunohistochemistry staining (IHC) for expression of the mismatch repair genes. Results showed normal expression for the protein products of MLH1, MSH2, MSH6 and PMS2. Since approximately 90-95% of Arteaga-syndrome associated tumors show abnormal IHC staining, this result significantly reduces the likelihood that [place] colorectal cancer was due to a germline mutation in one of these mismatch repair genes however it does not completely rule it out. Given Irma's young age of diagnosis and family history of colon and uterine cancer, genetic testing should still be considered. It is possible that Irma's colorectal tumor is due to a germline mutation in a non-Arteaga syndrome-related gene that elevates an individual's risk for colorectal cancer. It is also possible that [place] carries a Arteaga syndrome-related germline mutation that does not impact protein production and thus wouldn't lead to abnormal IHC.     The risks, benefits, and limitations of genetic testing were reviewed with the patient, as well as genetic discrimination laws, and possible test results (positive, negative, variants of uncertain significance) and their clinical implications. If positive for a mutation, options for managing cancer risk including increased surveillance, chemoprevention, and in some cases prophylactic surgery were discussed. [place] Toure was informed that if a hereditary cancer syndrome was identified in her, first degree relatives (parents, siblings, and children) have a chance of also inheriting the condition. Genetic testing would allow for predictive genetic testing in other relatives, who may also be at risk depending on their degree of relation. Billing:  Most individuals pay <$100 for hereditary cancer genetic testing. If insurance covers the cost of the testing, individuals may still pay out of pocket secondary to co-pays, co-insurance, or deductibles. If the cost of the testing exceeds $100, the lab will reach out to the patient via phone or e-mail. The patient will then have the option to proceed with the testing, cancel the testing, or elect the self-pay option of $250. [place]re verbalized understanding. Plan: Patient decided to proceed with testing and provided consent. Summary:     Sample Collection:  The patient's blood sample was drawn in the office on 8/3/23 by medical assistant Urbano Coats    Genetic Testing Preformed: [facility] Cancer Panel + RNA (52 genes): APC, ODILIA, AXIN2, BARD1, BMPR1A, BRCA1, BRCA2, BRIP1, CDH1, CDK4, CDKN2A, CHEK2, CTNNA1, DICER1, EPCAM, GREM1, HOXB13, KIT, MEN1, MLH1, MSH2, MSH3, MSH6, MUTYH, NBN, NF1, NTHL1, PALB2, PDGFRA, PMS2, POLD1, POLE, PTEN, RAD50, RAD51C, RAD51D, SDHA, SDHB, SDHC, SDHD, SMAD4, SMARCA4, STK11, TP53, TSC1, TSC2, VHL    Result Call Information:  In the event that we need to reach [place] Toure via telephone:  I confirmed the patient's home number on file as the best number to call with results  I confirmed with the patient that we can leave a voicemail on her home number    Results take approximately 2-3 weeks to complete once test is started.     [place] Toure will be notified via [address] [place] [address] once results are available. Additional recommendations for surveillance/medical management will be made pending genetic test results.

## 2023-08-03 NOTE — LETTER
August 3, 2023     Amelie Gonsales, [address] [facility] [address] [facility]  [address]    Patient: Gsu Humphreys  YOB: 1970  Date of Visit: 8/3/2023      Dear Dr. Jing Saxena:    Thank you for referring Gus Humphreys to me for evaluation. Below are my notes for this consultation. If you have questions, please do not hesitate to call me. I look forward to following your patient along with you. Sincerely,        Phong Huerta        CC: No Recipients        Pre-Test Genetic Counseling Consult Note    Patient Name: Gus Humphreys   /Age: /7 y.o. Referring Provider: Merlinda Coombs, MD     Date of Service: 8/3/2023  Genetic Counselor: Phong Huerta MS, [facility]  Interpretation Services: None  [facility]    Ahmet May was referred to the [address] [place], [place] [address] Floor and Genetic Assessment Program due to her personal history of colon cancer identified on her first colonoscopy. she presents today to discuss the possibility of a hereditary cancer syndrome, options for genetic testing, and implications for her and her family. Cancer History and Treatment:   Personal History:   Colon adenocarcinoma ([address] [facility])   2023 Biopsy     A. Transverse colon polyp cold snare, polypectomy:  -   Fragments of sessile serrated adenoma (deeper levels examined).     -   Separate scant fragments of tubular adenoma, likely tissue contaminant.       B. Sigmoid colon polyp hot snare, polypectomy:  -   Invasive adenocarcinoma, moderately differentiated, arising in fragments of traditional serrated adenoma. -   See comment and synoptic report.      2023 Initial Diagnosis     Colon adenocarcinoma [facility])      2023 Surgery     A. Sigmoid colon, sigmoidectomy:  -   Sigmoid colon with tattoo pigment and multinucleated giant cell reaction. -   Negative for residual adenoma and carcinoma. -   Resection margins are negative for adenoma and carcinoma.    -   Metastatic adenocarcinoma involving one of 19 lymph nodes (1/19, pN1a).     7/6/2023 -  Cancer Staged     Staging form: Colon and Rectum, AJCC 8th Edition  - Pathologic: Stage IIIA (pT1, pN1, cM0) - Signed by Shelley Mccarthy MD on 7/6/2023     RESULTS:  Antibody          Clone               Description                           Results  FSV0               G6                   Mismatch repair protein       Intact nuclear expression  MSH2              [id number]      Mismatch repair protein       Intact nuclear expression  ZDU2              90                    Mismatch repair protein       Intact nuclear expression  JAJ8              [id number]         Mismatch repair protein       Intact nuclear expression    Screening Hx:   Breast:  Breast Imaging: Mammogram (October 2022): IMPRESSION:  No mammographic evidence of malignancy.   Breast Biopsy: Right breast in 2008, benign   Breast Density: Extremely dense    Gynecologic:  Ovaries and Uterus intact     Skin:  Sees derm as needed     Other screening: n/a    Reproductive History  Age at menarche: 5  Age at first live birth: Nulliparous  Menopause: Perimenopausal  Hormone replacement: none     Medical and Surgical History  Pertinent surgical history:   Past Surgical History:   Procedure Laterality Date   • BREAST EXCISIONAL BIOPSY Right 2008    benign   • FOOT NEUROMA SURGERY Left    • FOOT SURGERY     • HEMICOLOECTOMY W/ ANASTOMOSIS N/A 6/23/2023    Procedure: LAPAROSCOPIC SIGMOID COLECTOMY WITH ROBOTICS, FLEXIBLE PROCTOSCOPY;  Surgeon: Shelley Mccarthy MD;  [facility];  Service: Surgical Oncology   • US BREAST NEEDLE LOC RIGHT Right 10/08/2008      Pertinent medical history:  Past Medical History:   Diagnosis Date   • Abnormal Pap smear of cervix    • Sinus congestion     chronic   • Varicella          Other History:  Height:   Ht Readings from Last 1 Encounters:   07/31/23 5' 7.01" (1.702 m)     Weight:   Wt Readings from Last 1 Encounters:   07/31/23 72.1 kg (159 lb)       Relevant Family History   Patient reports non-Ashkenazi Mosque ancestry. Maternal Family History:  Grandmother: breast cancer diagnosed at 61, "hormone related" (d.73 y/o)   Half-aunt: uterine cancer diagnosed at 40 (currently 61 y/o)  Half-aunt: breast cancer diagnosed at 72 (currently 77 y/o)    Paternal Family History:  Uncle: lung cancer, smoker/ETOH ()  Grandfather: colon cancer, no information (d.60s)     Please refer to the scanned pedigree in the Media Tab for a complete family history     *All history is reported as provided by the patient; records are not available for review, except where indicated. Assessment:  We discussed sporadic, familial and hereditary cancer. We reviewed that only 5-10% of cancers are considered hereditary. Cancers such as lung cancer, cervical cancer, testicular cancer, and liver cancer very rarely have a hereditary etiology, and we cannot test for a genetic predisposition for these cancers at this time. We also discussed the many factors that influence our risk for cancer such as age, environmental exposures, lifestyle choices and family history. We reviewed the indications suggestive of a hereditary predisposition to cancer. We reviewed the results of the immunohistochemistry staining (IHC) for expression of the mismatch repair genes. Results showed normal expression for the protein products of MLH1, MSH2, MSH6 and PMS2. Since approximately 90-95% of Arteaga-syndrome associated tumors show abnormal IHC staining, this result significantly reduces the likelihood that Irma's colorectal cancer was due to a germline mutation in one of these mismatch repair genes however it does not completely rule it out. Given Irma's young age of diagnosis and family history of colon and uterine cancer, genetic testing should still be considered.  It is possible that Irma's colorectal tumor is due to a germline mutation in a non-Arteaga syndrome-related gene that elevates an individual's risk for colorectal cancer. It is also possible that [place] carries a Arteaga syndrome-related germline mutation that does not impact protein production and thus wouldn't lead to abnormal IHC. The risks, benefits, and limitations of genetic testing were reviewed with the patient, as well as genetic discrimination laws, and possible test results (positive, negative, variants of uncertain significance) and their clinical implications. If positive for a mutation, options for managing cancer risk including increased surveillance, chemoprevention, and in some cases prophylactic surgery were discussed. [place] was informed that if a hereditary cancer syndrome was identified in her, first degree relatives (parents, siblings, and children) have a chance of also inheriting the condition. Genetic testing would allow for predictive genetic testing in other relatives, who may also be at risk depending on their degree of relation. Billing:  Most individuals pay <$100 for hereditary cancer genetic testing. If insurance covers the cost of the testing, individuals may still pay out of pocket secondary to co-pays, co-insurance, or deductibles. If the cost of the testing exceeds $100, the lab will reach out to the patient via phone or e-mail. The patient will then have the option to proceed with the testing, cancel the testing, or elect the self-pay option of $250. [place]elo verbalized understanding. Plan: Patient decided to proceed with testing and provided consent.     Summary:     Sample Collection:  The patient's blood sample was drawn in the office on 8/3/23 by medical assistant Ulises Dawn    Genetic Testing Preformed: [facility] Cancer Panel + RNA (52 genes): APC, ODILIA, AXIN2, BARD1, BMPR1A, BRCA1, BRCA2, BRIP1, CDH1, CDK4, CDKN2A, CHEK2, CTNNA1, DICER1, EPCAM, GREM1, HOXB13, KIT, MEN1, MLH1, MSH2, MSH3, MSH6, MUTYH, NBN, NF1, NTHL1, PALB2, PDGFRA, PMS2, POLD1, POLE, PTEN, RAD50, RAD51C, RAD51D, SDHA, SDHB, SDHC, SDHD, SMAD4, SMARCA4, STK11, TP53, TSC1, TSC2, VHL    Result Call Information:  In the event that we need to reach [place] via telephone:  I confirmed the patient's home number on file as the best number to call with results  I confirmed with the patient that we can leave a voicemail on her home number    Results take approximately 2-3 weeks to complete once test is started. [place] will be notified via [employer] once results are available. Additional recommendations for surveillance/medical management will be made pending genetic test results.

## 2023-08-07 ENCOUNTER — OFFICE VISIT (OUTPATIENT)
Dept: HEMATOLOGY ONCOLOGY | Facility: CLINIC | Age: 53
End: 2023-08-07
Payer: COMMERCIAL

## 2023-08-07 VITALS
WEIGHT: 154 LBS | HEART RATE: 68 BPM | RESPIRATION RATE: 18 BRPM | HEIGHT: 67 IN | TEMPERATURE: 97.3 F | BODY MASS INDEX: 24.17 KG/M2 | OXYGEN SATURATION: 99 % | SYSTOLIC BLOOD PRESSURE: 134 MMHG | DIASTOLIC BLOOD PRESSURE: 80 MMHG

## 2023-08-07 DIAGNOSIS — C18.9 COLON ADENOCARCINOMA (HCC): Primary | ICD-10-CM

## 2023-08-07 PROCEDURE — 99215 OFFICE O/P EST HI 40 MIN: CPT | Performed by: INTERNAL MEDICINE

## 2023-08-07 RX ORDER — CAPECITABINE 150 MG/1
300 TABLET, FILM COATED ORAL 2 TIMES DAILY
Qty: 56 TABLET | Refills: 2 | Status: SHIPPED | OUTPATIENT
Start: 2023-08-07

## 2023-08-07 RX ORDER — CAPECITABINE 500 MG/1
1500 TABLET, FILM COATED ORAL 2 TIMES DAILY
Qty: 84 TABLET | Refills: 2 | Status: SHIPPED | OUTPATIENT
Start: 2023-08-07

## 2023-08-14 DIAGNOSIS — C18.9 COLON ADENOCARCINOMA (HCC): Primary | ICD-10-CM

## 2023-08-14 RX ORDER — DEXTROSE MONOHYDRATE 50 MG/ML
20 INJECTION, SOLUTION INTRAVENOUS ONCE
Status: CANCELLED | OUTPATIENT
Start: 2023-08-21

## 2023-08-14 RX ORDER — SODIUM CHLORIDE 9 MG/ML
20 INJECTION, SOLUTION INTRAVENOUS ONCE
Status: CANCELLED | OUTPATIENT
Start: 2023-08-21

## 2023-08-18 ENCOUNTER — APPOINTMENT (OUTPATIENT)
Dept: LAB | Facility: HOSPITAL | Age: 53
End: 2023-08-18
Payer: COMMERCIAL

## 2023-08-18 DIAGNOSIS — C18.9 COLON ADENOCARCINOMA (HCC): ICD-10-CM

## 2023-08-18 LAB
ALBUMIN SERPL BCP-MCNC: 4.3 G/DL (ref 3.5–5)
ALP SERPL-CCNC: 58 U/L (ref 34–104)
ALT SERPL W P-5'-P-CCNC: 17 U/L (ref 7–52)
ANION GAP SERPL CALCULATED.3IONS-SCNC: 5 MMOL/L
AST SERPL W P-5'-P-CCNC: 19 U/L (ref 13–39)
BASOPHILS # BLD AUTO: 0.04 THOUSANDS/ÂΜL (ref 0–0.1)
BASOPHILS NFR BLD AUTO: 1 % (ref 0–1)
BILIRUB SERPL-MCNC: 0.4 MG/DL (ref 0.2–1)
BUN SERPL-MCNC: 19 MG/DL (ref 5–25)
CALCIUM SERPL-MCNC: 9.4 MG/DL (ref 8.4–10.2)
CHLORIDE SERPL-SCNC: 101 MMOL/L (ref 96–108)
CO2 SERPL-SCNC: 30 MMOL/L (ref 21–32)
CREAT SERPL-MCNC: 0.99 MG/DL (ref 0.6–1.3)
EOSINOPHIL # BLD AUTO: 0.21 THOUSAND/ÂΜL (ref 0–0.61)
EOSINOPHIL NFR BLD AUTO: 4 % (ref 0–6)
ERYTHROCYTE [DISTWIDTH] IN BLOOD BY AUTOMATED COUNT: 13.8 % (ref 11.6–15.1)
GFR SERPL CREATININE-BSD FRML MDRD: 65 ML/MIN/1.73SQ M
GLUCOSE P FAST SERPL-MCNC: 95 MG/DL (ref 65–99)
HCT VFR BLD AUTO: 37.3 % (ref 34.8–46.1)
HGB BLD-MCNC: 12.4 G/DL (ref 11.5–15.4)
IMM GRANULOCYTES # BLD AUTO: 0.02 THOUSAND/UL (ref 0–0.2)
IMM GRANULOCYTES NFR BLD AUTO: 0 % (ref 0–2)
LYMPHOCYTES # BLD AUTO: 2.37 THOUSANDS/ÂΜL (ref 0.6–4.47)
LYMPHOCYTES NFR BLD AUTO: 46 % (ref 14–44)
MCH RBC QN AUTO: 31 PG (ref 26.8–34.3)
MCHC RBC AUTO-ENTMCNC: 33.2 G/DL (ref 31.4–37.4)
MCV RBC AUTO: 93 FL (ref 82–98)
MONOCYTES # BLD AUTO: 0.44 THOUSAND/ÂΜL (ref 0.17–1.22)
MONOCYTES NFR BLD AUTO: 9 % (ref 4–12)
NEUTROPHILS # BLD AUTO: 2.01 THOUSANDS/ÂΜL (ref 1.85–7.62)
NEUTS SEG NFR BLD AUTO: 40 % (ref 43–75)
NRBC BLD AUTO-RTO: 0 /100 WBCS
PLATELET # BLD AUTO: 255 THOUSANDS/UL (ref 149–390)
PMV BLD AUTO: 10.7 FL (ref 8.9–12.7)
POTASSIUM SERPL-SCNC: 4.5 MMOL/L (ref 3.5–5.3)
PROT SERPL-MCNC: 7.1 G/DL (ref 6.4–8.4)
RBC # BLD AUTO: 4 MILLION/UL (ref 3.81–5.12)
SODIUM SERPL-SCNC: 136 MMOL/L (ref 135–147)
WBC # BLD AUTO: 5.09 THOUSAND/UL (ref 4.31–10.16)

## 2023-08-18 PROCEDURE — 85025 COMPLETE CBC W/AUTO DIFF WBC: CPT

## 2023-08-18 PROCEDURE — 36415 COLL VENOUS BLD VENIPUNCTURE: CPT

## 2023-08-18 PROCEDURE — 80053 COMPREHEN METABOLIC PANEL: CPT

## 2023-08-18 NOTE — PROGRESS NOTES
Hematology/Oncology Outpatient Office Note    Date of Service: 2023    [facility] HEMATOLOGY ONCOLOGY SPECIALISTS [facility]  [phone]    Reason for Consultation:   Chief Complaint   Patient presents with   • Follow-up       Cancer Pathologic Stage at diagnosis: IIIA    Referral Physician: No ref. provider found    Primary Care Physician:  Mark Kiser DO     Nickname: Stephany Markham    Spouse: Amanda Badillo ECO     Today's ECO    Goals and Barriers:  Current Goal: Minimize effects of disease burden, extend life. Barriers to accomplishing this: None    Patient's Capacity to Self Care:  Patient is able to self care      ASSESSMENT & PLAN      Diagnosis ICD-10-CM Associated Orders   1. Colon adenocarcinoma ([address])  C18.9             This is a 47 y.o. c PMHx notable for sinus congestion, being seen in consultation for early stage colon adenocarcinoma      Discussion of decision making  • Oncology history updated, accordingly, during this visit  • Goals of care/patient communication  o I discussed with the patient the clinical course leading up to their cancer diagnosis. I reviewed relevant office notes, imaging reports and pathology result as well.  o I told the patient that this is a case of curable disease and what this means. We discussed that the goal of anti-cancer therapy is to provide best quality of life, extend overall survival, and progression free survival as shown in clinical trials. We also discussed that there might be a point when the cancer will no longer respond to this anti-neoplastic therapy.   o I explained the risks/benefits of the proposed cancer therapy: CAPOX for 3 months and after discussion including understanding risks of possible life-threatening complications and therapy-related malignancy development, informed consent for blood products and treatment has been signed and obtained.   • TNM/Staging At Diagnosis  Cancer Staging  Colon adenocarcinoma ([address])  Staging form: Colon and Rectum, AJCC 8th Edition  - Pathologic: Stage IIIA (pT1, pN1, cM0) - Signed by Sherwin Lafleur MD on 7/6/2023  • Disease Features/Tumor Markers/Genetics  o Tumor Marker: 7/21/2023 CEA 0.8  o Notable Path Features: MMR-proficient, 1/19 LN involved  • Treatment: CAPOX  • Other Supportive care:   • Treatment Team Members  o Surgeon: Dr. Elian Garcia  o Palliative  • Labs: 7/21/2023: Hgb 13.3, WBC 5k, plt 240k  • Diagnostics  6/11/2023 CT CAP w/c: No evidence for metastatic disease in the chest, abdomen, or pelvis  7/28/2023: signatera negative    This is a patient with Stage III L sided colon cancer with only one high risk features (LN). The patient is MMR-proficient. From an overall performance status basis, I believe the patient can tolerate systemic treatment. Prognosis is more hopeful as we are dealing with an originating left sided. Thoughts on approach to systemic therapy in the first line and subsequent lines of therapy: If no adjuvant therapy or if Xeloda/5-FU done: The next stage in the evolution of adjuvant therapy involved the evaluation of 5-FU with leucovorin in several key trials. The NSABP C-03 study72 reported a 3-year disease-free survival (DFS) rate of 73% for patients receiving 5-FU/leucovorin, compared with a rate of 64% for those who received a combination of the alkylating nitrosourea lomustine, the alkaloid vincristine, and 5-FU (MOF; P = .0004). The IMPACT (International Multicenter Pooled Analysis of Colorectal Cancer Trials)73 pooled data from 3 randomized trials that investigated high-dose 5-FU/leucovorin compared with no adjuvant therapy. 5-FU/leucovorin reduced mortality by 22% (P = .029) and CRC events by 35% (P < .0001) compared with no adjuvant therapy    In the X-ACT (Xeloda in Adjuvant Colon Cancer Therapy) trial,78 patients were randomized to capecitabine or the [facility] regimen. There were no statistically significant differences between the 2 arms in terms of DFS (64.2% vs. 60.6%, respectively; P = NS) or OS (81.3% and 77.6%; P = .05). However, capecitabine was associated with significantly fewer adverse events than the [facility] regimen (P < .001). The NSABP C-06 study,79 which compared tegafur with leucovorin versus the [facility] regimen, reported that 5-year DFS (68.2% vs. 67.0%, respectively; P = NS) and OS (78.7% vs. 78.5%; P = NS) were similar for the 2 treatments. IDEA trial  We went over the IDEA trial which showed that 3 months of CAPOX was non-inferior to 6 months of mFOLFOX6 in all but high risk Stage 3 colon cancer. The absolute 0.4% difference in 5-year overall survival was noted. In high risk stage III colon cancers like her (T4), there would be nearly 1 more person out of a 100 alive at 5 years than otherwise would have . Grade ? 2 neurotoxicity during active therapy and in the first month after stopping study treatment occurred in 16.0% of patients in the 3-month group vs 44.5% of those in the 6-month group (CAPOX vs mFOLFOX6). Based on  ACCENT/IDEA pooled analysis of 11 trials, Raina et al suggested that early oxaliplatin discontinuation after 50% of the intended treatment was given (usually reason of Grade 1-2 neuropathy) did not impact 3 year DFS or 5 year OS (note that these numbers were negatively affected when <50% of intended oxaliplatin was given). 2.5% of pts had grade 3/4 neuropathy after 3 months of chemotherapy versus 15.9% who had 6 months. As a result, it is reasonable to discontinue oxaliplatin after 3 months for most patients      Survival Rates    In [facility], patients with stage II or III disease were randomly assigned to receive adjuvant FOLFOX4 or 5-FU/leucovorin.  After a median follow-up of 9.5 years, 10-year overall survival among all 2,246 patients was 71.7% in the FOLFOX4 group vs 67.1% in the 5-FU/leucovorin group (hazard ratio [HR] = 0.85, P = .043), 78.4% vs 79.5% in those with stage II disease (HR = 1.00, P = .980), and 67.1% vs 59.0% in those with stage III disease (HR = 0.80, P = .016). PO option: Xeloda 3888-6533 mg/m2 BID for 12 weeks (14 days on and 7 days off). Can start at 500 mg/m2 initially to work-up along with Oxaliplatin 135 mg/m2 q3 weeks        Discussion of disease response monitoring: We discussed with the patient at length the role of imaging and potential blood monitoring of burden of disease. In addition, we discussed at length the role of increasingly recognized peripheral blood monitoring of disease burden. Cell-free Circulating Tumor DNA Variant Allele Frequency has been associated with survival in metastatic cancer, albeit mainly determined in retrospective studies to date (https://pubmed.ncbi.nlm.nih.gov/14553030/). The patient had all of their questions and concerns answered and we will elect to pursue this. Restaging CT Chest/abdomen/pelvis (CAP) scans will be planned q6 months for 5 years. H&P q3-6 months in the first 2 years and then q6 months thereafter until year 5. CEA q6 months. C-scope 12 months after surgery (3-6 months if there was a pre-obstructing lesion prior to surgery not allowing c-scope to be done)      Discussion of decision making    I personally reviewed the following lab results, the image studies, pathology, other specialty/physicians consult notes and recommendations, and outside medical records. I had a lengthy discussion with the patient and shared the work-up findings. We discussed the diagnosis and management plan as below.  I spent 41 minutes reviewing the records (labs, clinician notes, outside records, medical history, ordering medicine/tests/procedures, interpreting the imaging/labs previously done) and coordination of care as well as direct time with the patient today, of which greater than 50% of the time was spent in counseling and coordination of care with the patient/family. · Plan/Labs  · CAPOX (Capecetaibine 500mg/m2 for C1, 1000 mg/m2 for C2) to be given q3 weeks for 4 cycles, C3 coming up (CEA added to this, then will be due 12/2023) and she will discontinue further oxaliplatin in case the second Alonso  is negative as well  · Compazine 10mg prn (Zofran didn't help her)  · Restaging CT CAP scheduled 12/11/2023  · Signatera surveillance being ordered 5 times until 5/2024  · F/u Surg-Onc for post surgery care, will be due for 1 year C-scope 6/2024        Follow Up: q3 weeks while on systemic therapy x 4 visits needed scheduled thru 10/9/2023    All questions were answered to the patient's satisfaction during this encounter. The patient knows the contact information for our office and knows to reach out for any relevant concerns related to this encounter. They are to call for any temperature 100.4 or higher, new symptoms including but not restricted to shaking chills, decreased appetite, nausea, vomiting, diarrhea, increased fatigue, shortness of breath or chest pain, confusion, and not feeling the strength to come to the clinic. For all other listed problems and medical diagnosis in their chart - they are managed by PCP and/or other specialists, which the patient acknowledges. Thank you very much for your consultation and making us a part of this patient's care. We are continuing to follow closely with you. Please do not hesitate to reach out to me with any additional questions or concerns. Hortencia Valdes MD  Hematology & Medical Oncology Staff Physician             Disclaimer: This document was prepared using [employer] Direct technology. If a word or phrase is confusing, or does not make sense, this is likely due to recognition error which was not discovered during this clinician's review. If you believe an error has occurred, please contact me through [address] line for elias? cation.       ONCOLOGY HISTORY OF PRESENT ILLNESS Oncology History   Colon adenocarcinoma ([address])   5/23/2023 Biopsy    A. Transverse colon polyp cold snare, polypectomy:  -   Fragments of sessile serrated adenoma (deeper levels examined). -   Separate scant fragments of tubular adenoma, likely tissue contaminant. B. Sigmoid colon polyp hot snare, polypectomy:  -   Invasive adenocarcinoma, moderately differentiated, arising in fragments of traditional serrated adenoma. -   MMR (MLH1, MSH2, MSH6 and PMS2) studies by immunohistochemistry on B2 are pending.  -   See comment and synoptic report. 6/8/2023 Initial Diagnosis    Colon adenocarcinoma ([address])     6/23/2023 Surgery    A. Sigmoid colon, sigmoidectomy:  -   Sigmoid colon with tattoo pigment and multinucleated giant cell reaction.  -   Negative for residual adenoma and carcinoma. -   Resection margins are negative for adenoma and carcinoma. -   Metastatic adenocarcinoma involving one of 19 lymph nodes (1/19, pN1a). 7/6/2023 -  Cancer Staged    Staging form: Colon and Rectum, AJCC 8th Edition  - Pathologic: Stage IIIA (pT1, pN1, cM0) - Signed by Corinne Gold MD on 7/6/2023 7/31/2023 -  Chemotherapy    alteplase (CATHFLO), 2 mg, Intracatheter, Every 1 Minute as needed, 2 of 4 cycles  oxaliplatin (ELOXATIN) chemo infusion, 245.7 mg (135 % of original dose 100 mg/m2), Intravenous, Once, 2 of 4 cycles  Dose modification: 135 mg/m2 (original dose 100 mg/m2, Cycle 1, Reason: Dose modified as per discussion with consulting physician)  Administration: 250 mg (7/31/2023), 250 mg (8/21/2023)       Status post robotic sigmoidectomy [6/26/2023-Dr. Locke].  Mismatch repair protein intact. Her cancer was identified by routine colonoscopy [3/24/23].   Results of the colonoscopy with biopsy of one of the 2 polyps (sigmoid) that were removed showed invasive adenocarcinoma, moderately differentiated, arising in fragments of traditional serrated adenoma.  Patient underwent subsequent sigmoid colectomy which showed metastatic adenocarcinoma involving one of 19 lymph nodes (1/19, pN1a). Patient denies any complications.  Denies any blood in stool or urine    SUBJECTIVE  (INTERVAL HISTORY)      Clotting History None   Bleeding History None   Cancer History Colon   Family Cancer History pGrandfather (colon), mGrandmother (breast), mAunt (breast), mAunt (uterine)   H/O Blood/Plt Transfusion None   Tobacco/etoh/drug abuse 15-pack-year smoking history [quit 2012]    Hx COVID19 Infection and Vaccine Status January 2022 COVID, vaccinated    Cancer Screening history up-to-date,  Next colonoscopy due 6/2024   Occupation retired, was working for [employer]     9/11/2023: C3 Xeloda    Some nausea/upset stomach last week but this is a lot better on Zyprexa. NO persistent neuropathy. I have reviewed the relevant past medical, surgical, social and family history. I have also reviewed allergies and medications for this patient. Review of Systems    Baseline weight: 155 lbs    Denies F/C, N/V, SOB, CP, LH, HA, rash, itching, gen weakness, melena, hematuria, hematochezia, falls, diarrhea, or constipation       A 10-point review of system was performed, pertinent positive and negative were detailed as above. Otherwise, the 10-point review of system was negative.       Past Medical History:   Diagnosis Date   • Abnormal Pap smear of cervix    • Sinus congestion     chronic   • Varicella        Past Surgical History:   Procedure Laterality Date   • BREAST EXCISIONAL BIOPSY Right 2008    benign   • FOOT NEUROMA SURGERY Left    • FOOT SURGERY     • HEMICOLOECTOMY W/ ANASTOMOSIS N/A 6/23/2023    Procedure: LAPAROSCOPIC SIGMOID COLECTOMY WITH ROBOTICS, FLEXIBLE PROCTOSCOPY;  Surgeon: Katie Calzada MD;  Location: [facility];  Service: Surgical Oncology   • US BREAST NEEDLE LOC RIGHT Right 10/08/2008       Family History   Problem Relation Age of Onset   • Diabetes Mother    • No Known Problems Father    • Breast cancer Maternal Grandmother         50-60's   • Stroke Maternal Grandmother    • Diabetes Maternal Grandmother    • Cancer Maternal Grandfather    • No Known Problems Paternal Grandmother    • No Known Problems Brother    • No Known Problems Half-Sister    • Breast cancer Maternal Aunt         68-70   • Endometrial cancer Maternal Aunt         maybe 40's   • No Known Problems Maternal Aunt    • Colon cancer Neg Hx    • Ovarian cancer Neg Hx        Social History     Socioeconomic History   • Marital status: /Civil Union     Spouse name: Not on file   • Number of children: Not on file   • Years of education: Not on file   • Highest education level: Not on file   Occupational History   • Not on file   Tobacco Use   • Smoking status: Former     Types: Cigarettes     Quit date:      Years since quittin.6   • Smokeless tobacco: Never   Vaping Use   • Vaping Use: Never used   Substance and Sexual Activity   • Alcohol use: Yes     Comment: social   • Drug use: Never   • Sexual activity: Yes     Partners: Male     Birth control/protection: I.U.D.      Comment: no new partner in past year   Other Topics Concern   • Not on file   Social History Narrative   • Not on file     Social Determinants of Health     Financial Resource Strain: Not on file   Food Insecurity: Not on file   Transportation Needs: Not on file   Physical Activity: Not on file   Stress: Not on file   Social Connections: Not on file   Intimate Partner Violence: Not on file   Housing Stability: Not on file       No Known Allergies    Current Outpatient Medications   Medication Sig Dispense Refill   • capecitabine (XELODA) 150 MG tablet Take 2 tablets (300 mg total) by mouth 2 (two) times a day For 2 weeks on followed by 1 week off 56 tablet 2   • capecitabine (XELODA) 500 MG tablet Take 3 tablets (1,500 mg total) by mouth 2 (two) times a day For 2 weeks on followed by 1 week off 84 tablet 2   • cholecalciferol (VITAMIN D3) 1,000 units tablet Take 2,000 Units by mouth daily     • Levonorgestrel (MIRENA) 20 MCG/DAY IUD 1 each by Intrauterine route once     • OLANZapine (ZyPREXA) 5 mg tablet take 1 tablet by mouth at bedtime 30 tablet 0   • prochlorperazine (COMPAZINE) 10 mg tablet Take 1 tablet (10 mg total) by mouth every 8 (eight) hours as needed for nausea or vomiting 30 tablet 0   • celecoxib (CeleBREX) 200 mg capsule Take 1 capsule (200 mg total) by mouth daily for 7 days (Patient not taking: Reported on 7/6/2023) 7 capsule 0   • enoxaparin (Lovenox) 40 mg/0.4 mL Inject 0.4 mL (40 mg total) under the skin in the morning for 25 days (Patient not taking: Reported on 8/7/2023) 10 mL 0   • gabapentin (NEURONTIN) 300 mg capsule Take 1 capsule (300 mg total) by mouth 3 (three) times a day for 7 days (Patient not taking: Reported on 7/6/2023) 21 capsule 0     No current facility-administered medications for this visit. (Not in a hospital admission)      Objective:     24 Hour Vitals Assessment:     Vitals:    08/28/23 1111   BP: 130/80   Pulse: 72   Resp: 16   Temp: (!) 96.8 °F (36 °C)   SpO2: 98%     Weight today: 158 lbs    PHYSICIAN EXAM:    General: Appearance: alert, cooperative, no distress. HEENT: Normocephalic, atraumatic. No scleral icterus. conjunctivae clear. EOMI. Chest: No tenderness to palpation. No open wound noted. Lungs: Clear to auscultation bilaterally, Respirations unlabored. Cardiac: Regular rate and rhythm, +S1and S2  Abdomen: Soft, non-tender, non-distended. Bowel sounds are normal.   Extremities:  No edema, cyanosis, clubbing. Skin: Skin color, turgor are normal. No rashes. Lymphatics: no palpable supra-cervical, axillary, or inguinal adenopathy  Neurologic: Awake, Alert, and oriented, no gross focal deficits noted b/l. DATA REVIEW:    Pathology Result:    Final Diagnosis   Date Value Ref Range Status   06/23/2023   Final    A.  Sigmoid colon, sigmoidectomy:  -   Sigmoid colon with tattoo pigment and multinucleated giant cell reaction.  -   Negative for residual adenoma and carcinoma. -   Resection margins are negative for adenoma and carcinoma. -   Metastatic adenocarcinoma involving one of 19 lymph nodes (1/19, pN1a). Interpretation performed at [place] [id number] [facility] ([place] [address] [facility]      05/23/2023   Final    A. Transverse colon polyp cold snare, polypectomy:  -   Fragments of sessile serrated adenoma (deeper levels examined). -   Separate scant fragments of tubular adenoma, likely tissue contaminant. B. Sigmoid colon polyp hot snare, polypectomy:  -   Invasive adenocarcinoma, moderately differentiated, arising in fragments of traditional serrated adenoma. -   MMR (MLH1, MSH2, MSH6 and PMS2) studies by immunohistochemistry on B2 are pending.  -   See comment and synoptic report. Comment:   Part B) CD31/CKC immunostain on B3 is negative for lymphovascular invasion. Select slide B2 is reviewed by Dr. Nora Bullard who concurs with the diagnosis. Diagnosis is communicated via [employer] [employer] to Dr. Yecenia Pond on 6/1/2023 at [address] Interpretation performed at [facility] [address]           Image Results:   Image result are reviewed and documented in Hematology/Oncology history    CT chest abdomen pelvis w contrast  Narrative: CT CHEST, ABDOMEN AND PELVIS WITH IV CONTRAST    INDICATION:   C18.9: Malignant neoplasm of colon, unspecified. COMPARISON:  None. TECHNIQUE: CT examination of the chest, abdomen and pelvis was performed. Multiplanar 2D reformatted images were created from the source data. This examination, like all CT scans performed in the [facility], was performed utilizing techniques to minimize radiation dose exposure, including the use of iterative reconstruction and automated exposure control.  Radiation dose length   product (DLP) for this visit:  717.16 mGy-cm    IV Contrast:  80 mL of iohexol (OMNIPAQUE)  Enteric Contrast: Enteric contrast was not administered. FINDINGS:    CHEST    LUNGS: No worrisome pulmonary nodule. There is no tracheal or endobronchial lesion. PLEURA:  Unremarkable. HEART/GREAT VESSELS: Heart is unremarkable for patient's age. No thoracic aortic aneurysm. MEDIASTINUM AND BRYAN:  Unremarkable. CHEST WALL AND LOWER NECK:  Unremarkable. ABDOMEN    LIVER/BILIARY TREE:  Unremarkable. GALLBLADDER:  No calcified gallstones. No pericholecystic inflammatory change. SPLEEN:  Unremarkable. PANCREAS:  Unremarkable. ADRENAL GLANDS:  Unremarkable. KIDNEYS/URETERS: One or more sharply circumscribed subcentimeter renal hypodensities are noted. These lesions are too small to accurately characterize, but are statistically most likely to represent benign cortical renal cyst(s). According to the   guidelines published in the [facility] Paper of the ACR Incidental Findings Committee (Radiology 2010), no further workup of these lesions is recommended. No hydronephrosis. STOMACH AND BOWEL:  Unremarkable. APPENDIX:  No findings to suggest appendicitis. ABDOMINOPELVIC CAVITY:  No ascites. No pneumoperitoneum. No lymphadenopathy. VESSELS:  Unremarkable for patient's age. PELVIS    REPRODUCTIVE ORGANS: There is an intrauterine device in the uterus. URINARY BLADDER:  Unremarkable. ABDOMINAL WALL/INGUINAL REGIONS:  Unremarkable. OSSEOUS STRUCTURES:  No acute fracture or destructive osseous lesion. Impression: No evidence for metastatic disease in the chest, abdomen, or pelvis. Workstation performed: [id number]      LABS:  Lab data are reviewed and documented in HemOn history.        Lab Results   Component Value Date    HGB 12.4 08/18/2023    HCT 37.3 08/18/2023    MCV 93 08/18/2023     08/18/2023    WBC 5.09 08/18/2023    NRBC 0 08/18/2023     Lab Results   Component Value Date    K 4.5 08/18/2023     08/18/2023    CO2 30 08/18/2023    BUN 19 08/18/2023    CREATININE 0.99 08/18/2023    GLUF 95 08/18/2023    CALCIUM 9.4 08/18/2023    AST 19 08/18/2023    ALT 17 08/18/2023    ALKPHOS 58 08/18/2023    EGFR 65 08/18/2023       No results found for: "IRON", "TIBC", "FERRITIN"    No results found for: "Marciana Hailey"    No results for input(s): "WBC", "CREAT", "PLT" in the last 72 hours.     By:  Archana Hernandez MD, 8/28/2023, 11:20 AM

## 2023-08-21 ENCOUNTER — HOSPITAL ENCOUNTER (OUTPATIENT)
Dept: INFUSION CENTER | Facility: HOSPITAL | Age: 53
Discharge: HOME | End: 2023-08-21
Attending: INTERNAL MEDICINE
Payer: COMMERCIAL

## 2023-08-21 ENCOUNTER — PATIENT OUTREACH (OUTPATIENT)
Dept: HEMATOLOGY ONCOLOGY | Facility: CLINIC | Age: 53
End: 2023-08-21

## 2023-08-21 VITALS
RESPIRATION RATE: 16 BRPM | HEART RATE: 76 BPM | BODY MASS INDEX: 25.08 KG/M2 | HEIGHT: 67 IN | DIASTOLIC BLOOD PRESSURE: 86 MMHG | OXYGEN SATURATION: 98 % | WEIGHT: 159.8 LBS | TEMPERATURE: 97.4 F | SYSTOLIC BLOOD PRESSURE: 146 MMHG

## 2023-08-21 DIAGNOSIS — C18.9 COLON ADENOCARCINOMA (HCC): Primary | ICD-10-CM

## 2023-08-21 PROCEDURE — 96375 TX/PRO/DX INJ NEW DRUG ADDON: CPT

## 2023-08-21 PROCEDURE — 96413 CHEMO IV INFUSION 1 HR: CPT

## 2023-08-21 PROCEDURE — 96415 CHEMO IV INFUSION ADDL HR: CPT

## 2023-08-21 RX ORDER — DEXTROSE MONOHYDRATE 50 MG/ML
20 INJECTION, SOLUTION INTRAVENOUS ONCE
Status: COMPLETED | OUTPATIENT
Start: 2023-08-21 | End: 2023-08-21

## 2023-08-21 RX ORDER — SODIUM CHLORIDE 9 MG/ML
20 INJECTION, SOLUTION INTRAVENOUS ONCE
Status: COMPLETED | OUTPATIENT
Start: 2023-08-21 | End: 2023-08-21

## 2023-08-21 RX ADMIN — DEXTROSE MONOHYDRATE 20 ML/HR: 50 INJECTION, SOLUTION INTRAVENOUS at 08:54

## 2023-08-21 RX ADMIN — OXALIPLATIN 250 MG: 5 INJECTION, SOLUTION INTRAVENOUS at 08:57

## 2023-08-21 RX ADMIN — DEXAMETHASONE SODIUM PHOSPHATE: 10 INJECTION, SOLUTION INTRAMUSCULAR; INTRAVENOUS at 08:23

## 2023-08-21 RX ADMIN — SODIUM CHLORIDE 20 ML/HR: 0.9 INJECTION, SOLUTION INTRAVENOUS at 08:23

## 2023-08-21 NOTE — PROGRESS NOTES
Pt here for chemo tolerated well no adverse reactions declined AVS and left ambulatory with steady gait.

## 2023-08-21 NOTE — PROGRESS NOTES
Called pt to check in and see how shes doing. She stated she is tired from treatment, but overall is doing well. She is aware of all upcoming appointments.  She was resting when I called, so I told her to call myself or RIANA Gutierrez if she needs anything

## 2023-08-22 ENCOUNTER — TELEPHONE (OUTPATIENT)
Dept: HEMATOLOGY ONCOLOGY | Facility: CLINIC | Age: 53
End: 2023-08-22

## 2023-08-23 ENCOUNTER — LAB REQUISITION (OUTPATIENT)
Dept: LAB | Facility: HOSPITAL | Age: 53
End: 2023-08-23

## 2023-08-23 DIAGNOSIS — D12.5 BENIGN NEOPLASM OF SIGMOID COLON: ICD-10-CM

## 2023-08-23 DIAGNOSIS — C18.7 MALIGNANT NEOPLASM OF SIGMOID COLON (HCC): ICD-10-CM

## 2023-08-23 LAB — SCAN RESULT: NORMAL

## 2023-08-23 PROCEDURE — 88363 XM ARCHIVE TISSUE MOLEC ANAL: CPT | Performed by: PATHOLOGY

## 2023-08-25 ENCOUNTER — TELEPHONE (OUTPATIENT)
Dept: GENETICS | Facility: CLINIC | Age: 53
End: 2023-08-25

## 2023-08-25 DIAGNOSIS — Z80.3 FH: BREAST CANCER: Primary | ICD-10-CM

## 2023-08-25 NOTE — TELEPHONE ENCOUNTER
Spoke with Zahra Latif to inform her the genetic testing for hereditary cancer returned negative.   We discussed that she qualifies for additional breast cancer screening and a referral was placed to Wyatt Goetz in the surgical oncology department

## 2023-08-28 ENCOUNTER — TELEPHONE (OUTPATIENT)
Dept: HEMATOLOGY ONCOLOGY | Facility: CLINIC | Age: 53
End: 2023-08-28

## 2023-08-28 ENCOUNTER — OFFICE VISIT (OUTPATIENT)
Dept: HEMATOLOGY ONCOLOGY | Facility: CLINIC | Age: 53
End: 2023-08-28
Payer: COMMERCIAL

## 2023-08-28 VITALS
TEMPERATURE: 96.8 F | OXYGEN SATURATION: 98 % | WEIGHT: 158 LBS | RESPIRATION RATE: 16 BRPM | SYSTOLIC BLOOD PRESSURE: 130 MMHG | HEIGHT: 67 IN | DIASTOLIC BLOOD PRESSURE: 80 MMHG | HEART RATE: 72 BPM | BODY MASS INDEX: 24.8 KG/M2

## 2023-08-28 DIAGNOSIS — C18.9 COLON ADENOCARCINOMA (HCC): Primary | ICD-10-CM

## 2023-08-28 DIAGNOSIS — T45.1X5A CHEMOTHERAPY INDUCED NAUSEA AND VOMITING: ICD-10-CM

## 2023-08-28 DIAGNOSIS — R11.2 CHEMOTHERAPY INDUCED NAUSEA AND VOMITING: ICD-10-CM

## 2023-08-28 PROCEDURE — 99215 OFFICE O/P EST HI 40 MIN: CPT | Performed by: INTERNAL MEDICINE

## 2023-08-28 RX ORDER — OLANZAPINE 5 MG/1
5 TABLET ORAL
Qty: 30 TABLET | Refills: 0 | Status: SHIPPED | OUTPATIENT
Start: 2023-08-28

## 2023-08-28 NOTE — TELEPHONE ENCOUNTER
I called Dulce Gaitan in response to a referral that was received for patient to establish care with Surgical Oncology. Outreach was made to schedule a consultation. Patient declined scheduling. The referral has been closed.

## 2023-08-30 DIAGNOSIS — C18.9 COLON ADENOCARCINOMA (HCC): Primary | ICD-10-CM

## 2023-09-06 RX ORDER — DEXTROSE MONOHYDRATE 50 MG/ML
20 INJECTION, SOLUTION INTRAVENOUS ONCE
Status: CANCELLED | OUTPATIENT
Start: 2023-09-13

## 2023-09-06 RX ORDER — SODIUM CHLORIDE 9 MG/ML
20 INJECTION, SOLUTION INTRAVENOUS ONCE
Status: CANCELLED | OUTPATIENT
Start: 2023-09-13

## 2023-09-08 DIAGNOSIS — C18.9 COLON ADENOCARCINOMA (HCC): Primary | ICD-10-CM

## 2023-09-09 ENCOUNTER — APPOINTMENT (OUTPATIENT)
Dept: LAB | Facility: HOSPITAL | Age: 53
End: 2023-09-09
Attending: INTERNAL MEDICINE
Payer: COMMERCIAL

## 2023-09-09 DIAGNOSIS — C18.9 COLON ADENOCARCINOMA (HCC): ICD-10-CM

## 2023-09-09 LAB
ALBUMIN SERPL BCP-MCNC: 4.5 G/DL (ref 3.5–5)
ALP SERPL-CCNC: 63 U/L (ref 34–104)
ALT SERPL W P-5'-P-CCNC: 59 U/L (ref 7–52)
ANION GAP SERPL CALCULATED.3IONS-SCNC: 6 MMOL/L
AST SERPL W P-5'-P-CCNC: 45 U/L (ref 13–39)
BASOPHILS # BLD AUTO: 0.04 THOUSANDS/ÂΜL (ref 0–0.1)
BASOPHILS NFR BLD AUTO: 1 % (ref 0–1)
BILIRUB SERPL-MCNC: 0.46 MG/DL (ref 0.2–1)
BUN SERPL-MCNC: 24 MG/DL (ref 5–25)
CALCIUM SERPL-MCNC: 9.3 MG/DL (ref 8.4–10.2)
CEA SERPL-MCNC: 2.4 NG/ML (ref 0–3)
CHLORIDE SERPL-SCNC: 103 MMOL/L (ref 96–108)
CO2 SERPL-SCNC: 26 MMOL/L (ref 21–32)
CREAT SERPL-MCNC: 0.97 MG/DL (ref 0.6–1.3)
EOSINOPHIL # BLD AUTO: 0.38 THOUSAND/ÂΜL (ref 0–0.61)
EOSINOPHIL NFR BLD AUTO: 6 % (ref 0–6)
ERYTHROCYTE [DISTWIDTH] IN BLOOD BY AUTOMATED COUNT: 14.6 % (ref 11.6–15.1)
GFR SERPL CREATININE-BSD FRML MDRD: 66 ML/MIN/1.73SQ M
GLUCOSE P FAST SERPL-MCNC: 95 MG/DL (ref 65–99)
HCT VFR BLD AUTO: 35.1 % (ref 34.8–46.1)
HGB BLD-MCNC: 12 G/DL (ref 11.5–15.4)
IMM GRANULOCYTES # BLD AUTO: 0.04 THOUSAND/UL (ref 0–0.2)
IMM GRANULOCYTES NFR BLD AUTO: 1 % (ref 0–2)
LYMPHOCYTES # BLD AUTO: 2.42 THOUSANDS/ÂΜL (ref 0.6–4.47)
LYMPHOCYTES NFR BLD AUTO: 38 % (ref 14–44)
MCH RBC QN AUTO: 31.7 PG (ref 26.8–34.3)
MCHC RBC AUTO-ENTMCNC: 34.2 G/DL (ref 31.4–37.4)
MCV RBC AUTO: 93 FL (ref 82–98)
MONOCYTES # BLD AUTO: 0.65 THOUSAND/ÂΜL (ref 0.17–1.22)
MONOCYTES NFR BLD AUTO: 10 % (ref 4–12)
NEUTROPHILS # BLD AUTO: 2.93 THOUSANDS/ÂΜL (ref 1.85–7.62)
NEUTS SEG NFR BLD AUTO: 44 % (ref 43–75)
NRBC BLD AUTO-RTO: 0 /100 WBCS
PLATELET # BLD AUTO: 194 THOUSANDS/UL (ref 149–390)
PMV BLD AUTO: 9.8 FL (ref 8.9–12.7)
POTASSIUM SERPL-SCNC: 4.7 MMOL/L (ref 3.5–5.3)
PROT SERPL-MCNC: 7.2 G/DL (ref 6.4–8.4)
RBC # BLD AUTO: 3.79 MILLION/UL (ref 3.81–5.12)
SODIUM SERPL-SCNC: 135 MMOL/L (ref 135–147)
WBC # BLD AUTO: 6.46 THOUSAND/UL (ref 4.31–10.16)

## 2023-09-09 PROCEDURE — 85025 COMPLETE CBC W/AUTO DIFF WBC: CPT

## 2023-09-09 PROCEDURE — 80053 COMPREHEN METABOLIC PANEL: CPT

## 2023-09-09 PROCEDURE — 36415 COLL VENOUS BLD VENIPUNCTURE: CPT

## 2023-09-09 PROCEDURE — 82378 CARCINOEMBRYONIC ANTIGEN: CPT

## 2023-09-09 NOTE — PROGRESS NOTES
Hematology/Oncology Outpatient Office Note    Date of Service: 2023    [facility]ATOLOG[facility] ONCOL[facility]  [phone]    Reason for Consultation:   Chief Complaint   Patient presents with   • Follow-up       Cancer Pathologic Stage at diagnosis: IIIA    Referral Physician: No ref. provider found    Primary Care Physician:  Lauren Garcia DO     Nickname: Ba Juanks    Spouse: Veronique Pineda ECO     Today's ECO    Goals and Barriers:  Current Goal: Minimize effects of disease burden, extend life. Barriers to accomplishing this: None    Patient's Capacity to Self Care:  Patient is able to self care      ASSESSMENT & PLAN      Diagnosis ICD-10-CM Associated Orders   1. Colon adenocarcinoma ([address])  C18.9             This is a 46 y.o. c PMHx notable for sinus congestion, being seen in consultation for early stage colon adenocarcinoma      Discussion of decision making  • Oncology history updated, accordingly, during this visit  • Goals of care/patient communication  o I discussed with the patient the clinical course leading up to their cancer diagnosis. I reviewed relevant office notes, imaging reports and pathology result as well.  o I told the patient that this is a case of curable disease and what this means. We discussed that the goal of anti-cancer therapy is to provide best quality of life, extend overall survival, and progression free survival as shown in clinical trials. We also discussed that there might be a point when the cancer will no longer respond to this anti-neoplastic therapy.   o I explained the risks/benefits of the proposed cancer therapy: CAPOX for 3 months and after discussion including understanding risks of possible life-threatening complications and therapy-related malignancy development, informed consent for blood products and treatment has been signed and obtained.   • TNM/Staging At Diagnosis  Cancer Staging  Colon adenocarcinoma [facility])  Staging form: Colon and Rectum, AJCC 8th Edition  - Pathologic: Stage IIIA (pT1, pN1, cM0) - Signed by Randy Dyer MD on 7/6/2023  • Disease Features/Tumor Markers/Genetics  o Tumor Marker: 7/21/2023 CEA 0.8  o Notable Path Features: MMR-proficient, 1/19 LN involved  • Treatment: CAPOX  • Other Supportive care:   • Treatment Team Members  o Surgeon: Dr. Brian Rubio  o Palliative  • Labs: 7/21/2023: Hgb 13.3, WBC 5k, plt 240k  • Diagnostics  6/11/2023 CT CAP w/c: No evidence for metastatic disease in the chest, abdomen, or pelvis  7/28/2023: signatera negative  9/1/2023 signatera negative    This is a patient with Stage III L sided colon cancer with only one high risk features (LN). The patient is MMR-proficient. From an overall performance status basis, I believe the patient can tolerate systemic treatment. Prognosis is more hopeful as we are dealing with an originating left sided. Thoughts on approach to systemic therapy in the first line and subsequent lines of therapy: If no adjuvant therapy or if Xeloda/5-FU done: The next stage in the evolution of adjuvant therapy involved the evaluation of 5-FU with leucovorin in several key trials. The NSABP C-03 study72 reported a 3-year disease-free survival (DFS) rate of 73% for patients receiving 5-FU/leucovorin, compared with a rate of 64% for those who received a combination of the alkylating nitrosourea lomustine, the alkaloid vincristine, and 5-FU (MOF; P = .0004). The IMPACT (International Multicenter Pooled Analysis of Colorectal Cancer Trials)73 pooled data from 3 randomized trials that investigated high-dose 5-FU/leucovorin compared with no adjuvant therapy.  5-FU/leucovorin reduced mortality by 22% (P = .029) and CRC events by 35% (P < .0001) compared with no adjuvant therapy    In the X-ACT (Xeloda in Adjuvant Colon Cancer Therapy) trial,78 patients were randomized to capecitabine or the [facility] regimen. There were no statistically significant differences between the 2 arms in terms of DFS (64.2% vs. 60.6%, respectively; P = NS) or OS (81.3% and 77.6%; P = .05). However, capecitabine was associated with significantly fewer adverse events than the [facility] regimen (P < .001). The NSABP C-06 study,79 which compared tegafur with leucovorin versus the [facility] regimen, reported that 5-year DFS (68.2% vs. 67.0%, respectively; P = NS) and OS (78.7% vs. 78.5%; P = NS) were similar for the 2 treatments. IDEA trial  We went over the IDEA trial which showed that 3 months of CAPOX was non-inferior to 6 months of mFOLFOX6 in all but high risk Stage 3 colon cancer. The absolute 0.4% difference in 5-year overall survival was noted. In high risk stage III colon cancers like her (T4), there would be nearly 1 more person out of a 100 alive at 5 years than otherwise would have . Grade ? 2 neurotoxicity during active therapy and in the first month after stopping study treatment occurred in 16.0% of patients in the 3-month group vs 44.5% of those in the 6-month group (CAPOX vs mFOLFOX6). Based on  ACCENT/IDEA pooled analysis of 11 trials, Raina et al suggested that early oxaliplatin discontinuation after 50% of the intended treatment was given (usually reason of Grade 1-2 neuropathy) did not impact 3 year DFS or 5 year OS (note that these numbers were negatively affected when <50% of intended oxaliplatin was given). 2.5% of pts had grade 3/4 neuropathy after 3 months of chemotherapy versus 15.9% who had 6 months. As a result, it is reasonable to discontinue oxaliplatin after 3 months for most patients      Survival Rates    In [facility], patients with stage II or III disease were randomly assigned to receive adjuvant FOLFOX4 or 5-FU/leucovorin.  After a median follow-up of 9.5 years, 10-year overall survival among all 2,246 patients was 71.7% in the FOLFOX4 group vs 67.1% in the 5-FU/leucovorin group (hazard ratio [HR] = 0.85, P = .043), 78.4% vs 79.5% in those with stage II disease (HR = 1.00, P = .980), and 67.1% vs 59.0% in those with stage III disease (HR = 0.80, P = .016). PO option: Xeloda 4268-3632 mg/m2 BID for 12 weeks (14 days on and 7 days off). Can start at 500 mg/m2 initially to work-up along with Oxaliplatin 135 mg/m2 q3 weeks        Discussion of disease response monitoring: We discussed with the patient at length the role of imaging and potential blood monitoring of burden of disease. In addition, we discussed at length the role of increasingly recognized peripheral blood monitoring of disease burden. Cell-free Circulating Tumor DNA Variant Allele Frequency has been associated with survival in metastatic cancer, albeit mainly determined in retrospective studies to date (https://pubmed.ncbi.nlm.nih.gov/86496140/). The patient had all of their questions and concerns answered and we will elect to pursue this. Restaging CT Chest/abdomen/pelvis (CAP) scans will be planned q6 months for 5 years. H&P q3-6 months in the first 2 years and then q6 months thereafter until year 5. CEA q6 months. C-scope 12 months after surgery (3-6 months if there was a pre-obstructing lesion prior to surgery not allowing c-scope to be done)      Discussion of decision making    I personally reviewed the following lab results, the image studies, pathology, other specialty/physicians consult notes and recommendations, and outside medical records. I had a lengthy discussion with the patient and shared the work-up findings. We discussed the diagnosis and management plan as below.  I spent 41 minutes reviewing the records (labs, clinician notes, outside records, medical history, ordering medicine/tests/procedures, interpreting the imaging/labs previously done) and coordination of care as well as direct time with the patient today, of which greater than 50% of the time was spent in counseling and coordination of care with the patient/family. · Plan/Labs  · Cont Xeloda for 4 total cycles, C4 coming up and then she will be done  · Compazine 10mg prn (Zofran didn't help her)  · Restaging CT CAP scheduled 12/11/2023  · Signatera surveillance being 5 times until 5/2024  · F/u Surg-Onc for post surgery care, will be due for 1 year C-scope 6/2024        Follow Up: q3 weeks while on systemic therapy x 4 visits needed scheduled thru *    All questions were answered to the patient's satisfaction during this encounter. The patient knows the contact information for our office and knows to reach out for any relevant concerns related to this encounter. They are to call for any temperature 100.4 or higher, new symptoms including but not restricted to shaking chills, decreased appetite, nausea, vomiting, diarrhea, increased fatigue, shortness of breath or chest pain, confusion, and not feeling the strength to come to the clinic. For all other listed problems and medical diagnosis in their chart - they are managed by PCP and/or other specialists, which the patient acknowledges. Thank you very much for your consultation and making us a part of this patient's care. We are continuing to follow closely with you. Please do not hesitate to reach out to me with any additional questions or concerns. Hortencia Wilson MD  Hematology & Medical Oncology Staff Physician             Disclaimer: This document was prepared using Message [employer] Direct technology. If a word or phrase is confusing, or does not make sense, this is likely due to recognition error which was not discovered during this clinician's review. If you believe an error has occurred, please contact me through [facility] line for elias? cation. ONCOLOGY HISTORY OF PRESENT ILLNESS        Oncology History   Colon adenocarcinoma ([address])   5/23/2023 Biopsy    A. Transverse colon polyp cold snare, polypectomy:  -   Fragments of sessile serrated adenoma (deeper levels examined). -   Separate scant fragments of tubular adenoma, likely tissue contaminant. B. Sigmoid colon polyp hot snare, polypectomy:  -   Invasive adenocarcinoma, moderately differentiated, arising in fragments of traditional serrated adenoma. -   MMR (MLH1, MSH2, MSH6 and PMS2) studies by immunohistochemistry on B2 are pending.  -   See comment and synoptic report. 6/8/2023 Initial Diagnosis    Colon adenocarcinoma ([address])     6/23/2023 Surgery    A. Sigmoid colon, sigmoidectomy:  -   Sigmoid colon with tattoo pigment and multinucleated giant cell reaction.  -   Negative for residual adenoma and carcinoma. -   Resection margins are negative for adenoma and carcinoma. -   Metastatic adenocarcinoma involving one of 19 lymph nodes (1/19, pN1a). 7/6/2023 -  Cancer Staged    Staging form: Colon and Rectum, AJCC 8th Edition  - Pathologic: Stage IIIA (pT1, pN1, cM0) - Signed by Kei Nye MD on 7/6/2023 7/31/2023 - 8/21/2023 Chemotherapy    alteplase (CATHFLO), 2 mg, Intracatheter, Every 1 Minute as needed, 2 of 4 cycles  oxaliplatin (ELOXATIN) chemo infusion, 245.7 mg (135 % of original dose 100 mg/m2), Intravenous, Once, 2 of 4 cycles  Dose modification: 135 mg/m2 (original dose 100 mg/m2, Cycle 1, Reason: Dose modified as per discussion with consulting physician)  Administration: 250 mg (7/31/2023), 250 mg (8/21/2023)       Status post robotic sigmoidectomy [6/26/2023-Dr. Locke].  Mismatch repair protein intact. Her cancer was identified by routine colonoscopy [3/24/23].  Results of the colonoscopy with biopsy of one of the 2 polyps (sigmoid) that were removed showed invasive adenocarcinoma, moderately differentiated, arising in fragments of traditional serrated adenoma.  Patient underwent subsequent sigmoid colectomy which showed metastatic adenocarcinoma involving one of 19 lymph nodes (1/19, pN1a).    Patient denies any complications.  Denies any blood in stool or urine    CAPOX (Capecitabine 1000 mg/m2) was given q3 weeks for 2 cycles, and then discontinued oxaliplatin as she had 2 negative Signatera ct DNA and she decided to not pursue any further Oxaliplatin as a result due to neuropathy concerns    SUBJECTIVE  (INTERVAL HISTORY)      Clotting History None   Bleeding History None   Cancer History Colon   Family Cancer History pGrandfather (colon), mGrandmother (breast), mAunt (breast), mAunt (uterine)   H/O Blood/Plt Transfusion None   Tobacco/etoh/drug abuse 15-pack-year smoking history [quit 2012]    Hx COVID19 Infection and Vaccine Status January 2022 COVID, vaccinated    Cancer Screening history up-to-date,  Next colonoscopy due 6/2024   Occupation retired, was working for [employer]     9/11/2023: C3 Xeloda    Some days are better than others. She did some jogging this morning. NO persistent neuropathy. I have reviewed the relevant past medical, surgical, social and family history. I have also reviewed allergies and medications for this patient. Review of Systems    Baseline weight: 155 lbs    Denies F/C, N/V, SOB, CP, LH, HA, rash, itching, gen weakness, melena, hematuria, hematochezia, falls, diarrhea, or constipation       A 10-point review of system was performed, pertinent positive and negative were detailed as above. Otherwise, the 10-point review of system was negative.       Past Medical History:   Diagnosis Date   • Abnormal Pap smear of cervix    • Sinus congestion     chronic   • Varicella        Past Surgical History:   Procedure Laterality Date   • BREAST EXCISIONAL BIOPSY Right 2008    benign   • FOOT NEUROMA SURGERY Left    • FOOT SURGERY     • HEMICOLOECTOMY W/ ANASTOMOSIS N/A 6/23/2023    Procedure: LAPAROSCOPIC SIGMOID COLECTOMY WITH ROBOTICS, FLEXIBLE PROCTOSCOPY;  Surgeon: Meredith Quiroz MD;  Location: [facility];  Service: Surgical Oncology   • US BREAST NEEDLE LOC RIGHT Right 10/08/2008       Family History   Problem Relation Age of Onset   • Diabetes Mother    • No Known Problems Father    • Breast cancer Maternal Grandmother         50-60's   • Stroke Maternal Grandmother    • Diabetes Maternal Grandmother    • Cancer Maternal Grandfather    • No Known Problems Paternal Grandmother    • No Known Problems Brother    • No Known Problems Half-Sister    • Breast cancer Maternal Aunt         68-70   • Endometrial cancer Maternal Aunt         maybe 40's   • No Known Problems Maternal Aunt    • Colon cancer Neg Hx    • Ovarian cancer Neg Hx        Social History     Socioeconomic History   • Marital status: /Civil Union     Spouse name: Not on file   • Number of children: Not on file   • Years of education: Not on file   • Highest education level: Not on file   Occupational History   • Not on file   Tobacco Use   • Smoking status: Former     Types: Cigarettes     Quit date:      Years since quittin.7   • Smokeless tobacco: Never   Vaping Use   • Vaping Use: Never used   Substance and Sexual Activity   • Alcohol use: Yes     Comment: social   • Drug use: Never   • Sexual activity: Yes     Partners: Male     Birth control/protection: I.U.D.      Comment: no new partner in past year   Other Topics Concern   • Not on file   Social History Narrative   • Not on file     Social Determinants of Health     Financial Resource Strain: Not on file   Food Insecurity: Not on file   Transportation Needs: Not on file   Physical Activity: Not on file   Stress: Not on file   Social Connections: Not on file   Intimate Partner Violence: Not on file   Housing Stability: Not on file       No Known Allergies    Current Outpatient Medications   Medication Sig Dispense Refill   • capecitabine (XELODA) 150 MG tablet Take 2 tablets (300 mg total) by mouth 2 (two) times a day For 2 weeks on followed by 1 week off 56 tablet 2   • capecitabine (XELODA) 500 MG tablet Take 3 tablets (1,500 mg total) by mouth 2 (two) times a day For 2 weeks on followed by 1 week off 84 tablet 2   • cholecalciferol (VITAMIN D3) 1,000 units tablet Take 2,000 Units by mouth daily     • Levonorgestrel (MIRENA) 20 MCG/DAY IUD 1 each by Intrauterine route once     • OLANZapine (ZyPREXA) 5 mg tablet take 1 tablet by mouth at bedtime 30 tablet 0   • prochlorperazine (COMPAZINE) 10 mg tablet Take 1 tablet (10 mg total) by mouth every 8 (eight) hours as needed for nausea or vomiting 30 tablet 0   • celecoxib (CeleBREX) 200 mg capsule Take 1 capsule (200 mg total) by mouth daily for 7 days (Patient not taking: Reported on 7/6/2023) 7 capsule 0   • enoxaparin (Lovenox) 40 mg/0.4 mL Inject 0.4 mL (40 mg total) under the skin in the morning for 25 days (Patient not taking: Reported on 8/7/2023) 10 mL 0   • gabapentin (NEURONTIN) 300 mg capsule Take 1 capsule (300 mg total) by mouth 3 (three) times a day for 7 days (Patient not taking: Reported on 7/6/2023) 21 capsule 0     No current facility-administered medications for this visit. (Not in a hospital admission)      Objective:     24 Hour Vitals Assessment:     Vitals:    09/19/23 1343   BP: 122/78   Pulse: 88   Resp: 18   Temp: (!) 96.9 °F (36.1 °C)   SpO2: 99%     Weight at last visit: 158 lbs  Weight today: 154 lbs    PHYSICIAN EXAM:    General: Appearance: alert, cooperative, no distress. HEENT: Normocephalic, atraumatic. No scleral icterus. conjunctivae clear. EOMI. Chest: No tenderness to palpation. No open wound noted. Lungs: Clear to auscultation bilaterally, Respirations unlabored. Cardiac: Regular rate and rhythm, +S1and S2  Abdomen: Soft, non-tender, non-distended. Bowel sounds are normal.   Extremities:  No edema, cyanosis, clubbing. Skin: Skin color, turgor are normal. No rashes. Lymphatics: no palpable supra-cervical, axillary, or inguinal adenopathy  Neurologic: Awake, Alert, and oriented, no gross focal deficits noted b/l. DATA REVIEW:    Pathology Result:    Final Diagnosis   Date Value Ref Range Status   06/23/2023   Final    A.  Sigmoid colon, sigmoidectomy:  -   Sigmoid colon with tattoo pigment and multinucleated giant cell reaction.  -   Negative for residual adenoma and carcinoma. -   Resection margins are negative for adenoma and carcinoma. -   Metastatic adenocarcinoma involving one of 19 lymph nodes (1/19, pN1a). Interpretation performed at [place] [id number] [facility] [address] [facility]      05/23/2023   Final    A. Transverse colon polyp cold snare, polypectomy:  -   Fragments of sessile serrated adenoma (deeper levels examined). -   Separate scant fragments of tubular adenoma, likely tissue contaminant. B. Sigmoid colon polyp hot snare, polypectomy:  -   Invasive adenocarcinoma, moderately differentiated, arising in fragments of traditional serrated adenoma. -   MMR (MLH1, MSH2, MSH6 and PMS2) studies by immunohistochemistry on B2 are pending.  -   See comment and synoptic report. Comment:   Part B) CD31/CKC immunostain on B3 is negative for lymphovascular invasion. Select slide B2 is reviewed by Dr. Savannah Shelton who concurs with the diagnosis. Diagnosis is communicated via [email or web address] to Dr. Tyson Ortiz on 6/1/2023 at [address] Interpretation performed at [facility] [address] [facility], [address]           Image Results:   Image result are reviewed and documented in Hematology/Oncology history    CT chest abdomen pelvis w contrast  Narrative: CT CHEST, ABDOMEN AND PELVIS WITH IV CONTRAST    INDICATION:   C18.9: Malignant neoplasm of colon, unspecified. COMPARISON:  None. TECHNIQUE: CT examination of the chest, abdomen and pelvis was performed. Multiplanar 2D reformatted images were created from the source data. This examination, like all CT scans performed in the [facility], was performed utilizing techniques to minimize radiation dose exposure, including the use of iterative reconstruction and automated exposure control.  Radiation dose length   product (DLP) for this visit: 717.16 mGy-cm    IV Contrast:  80 mL of iohexol (OMNIPAQUE)  Enteric Contrast: Enteric contrast was not administered. FINDINGS:    CHEST    LUNGS: No worrisome pulmonary nodule. There is no tracheal or endobronchial lesion. PLEURA:  Unremarkable. HEART/GREAT VESSELS: Heart is unremarkable for patient's age. No thoracic aortic aneurysm. MEDIASTINUM AND BRYAN:  Unremarkable. CHEST WALL AND LOWER NECK:  Unremarkable. ABDOMEN    LIVER/BILIARY TREE:  Unremarkable. GALLBLADDER:  No calcified gallstones. No pericholecystic inflammatory change. SPLEEN:  Unremarkable. PANCREAS:  Unremarkable. ADRENAL GLANDS:  Unremarkable. KIDNEYS/URETERS: One or more sharply circumscribed subcentimeter renal hypodensities are noted. These lesions are too small to accurately characterize, but are statistically most likely to represent benign cortical renal cyst(s). According to the   guidelines published in the [facility] Paper of the ACR Incidental Findings Committee (Radiology 2010), no further workup of these lesions is recommended. No hydronephrosis. STOMACH AND BOWEL:  Unremarkable. APPENDIX:  No findings to suggest appendicitis. ABDOMINOPELVIC CAVITY:  No ascites. No pneumoperitoneum. No lymphadenopathy. VESSELS:  Unremarkable for patient's age. PELVIS    REPRODUCTIVE ORGANS: There is an intrauterine device in the uterus. URINARY BLADDER:  Unremarkable. ABDOMINAL WALL/INGUINAL REGIONS:  Unremarkable. OSSEOUS STRUCTURES:  No acute fracture or destructive osseous lesion. Impression: No evidence for metastatic disease in the chest, abdomen, or pelvis. Workstation performed: [id number]      LABS:  Lab data are reviewed and documented in HemOn history.        Lab Results   Component Value Date    HGB 12.0 09/09/2023    HCT 35.1 09/09/2023    MCV 93 09/09/2023     09/09/2023    WBC 6.46 09/09/2023    NRBC 0 09/09/2023     Lab Results   Component Value Date    K 4.7 09/09/2023  09/09/2023    CO2 26 09/09/2023    BUN 24 09/09/2023    CREATININE 0.97 09/09/2023    GLUF 95 09/09/2023    CALCIUM 9.3 09/09/2023    AST 45 (H) 09/09/2023    ALT 59 (H) 09/09/2023    ALKPHOS 63 09/09/2023    EGFR 66 09/09/2023       No results found for: "IRON", "TIBC", "FERRITIN"    No results found for: "Sigala Riis"    No results for input(s): "WBC", "CREAT", "PLT" in the last 72 hours.     By:  Elliott Jackson MD, 9/19/2023, 2:30 PM

## 2023-09-11 ENCOUNTER — TELEPHONE (OUTPATIENT)
Dept: HEMATOLOGY ONCOLOGY | Facility: CLINIC | Age: 53
End: 2023-09-11

## 2023-09-13 ENCOUNTER — HOSPITAL ENCOUNTER (OUTPATIENT)
Dept: INFUSION CENTER | Facility: HOSPITAL | Age: 53
End: 2023-09-13
Attending: INTERNAL MEDICINE

## 2023-09-19 ENCOUNTER — OFFICE VISIT (OUTPATIENT)
Dept: HEMATOLOGY ONCOLOGY | Facility: CLINIC | Age: 53
End: 2023-09-19
Payer: COMMERCIAL

## 2023-09-19 VITALS
HEART RATE: 88 BPM | BODY MASS INDEX: 24.17 KG/M2 | SYSTOLIC BLOOD PRESSURE: 122 MMHG | DIASTOLIC BLOOD PRESSURE: 78 MMHG | HEIGHT: 67 IN | RESPIRATION RATE: 18 BRPM | TEMPERATURE: 96.9 F | OXYGEN SATURATION: 99 % | WEIGHT: 154 LBS

## 2023-09-19 DIAGNOSIS — C18.9 COLON ADENOCARCINOMA (HCC): Primary | ICD-10-CM

## 2023-09-19 PROCEDURE — 99215 OFFICE O/P EST HI 40 MIN: CPT | Performed by: INTERNAL MEDICINE

## 2023-09-25 DIAGNOSIS — C18.9 COLON ADENOCARCINOMA (HCC): ICD-10-CM

## 2023-09-26 RX ORDER — CAPECITABINE 500 MG/1
TABLET, FILM COATED ORAL
Qty: 84 TABLET | Refills: 2 | Status: SHIPPED | OUTPATIENT
Start: 2023-09-26

## 2023-09-26 RX ORDER — CAPECITABINE 150 MG/1
TABLET, FILM COATED ORAL
Qty: 56 TABLET | Refills: 2 | Status: SHIPPED | OUTPATIENT
Start: 2023-09-26

## 2023-09-28 ENCOUNTER — TELEPHONE (OUTPATIENT)
Dept: HEMATOLOGY ONCOLOGY | Facility: CLINIC | Age: 53
End: 2023-09-28

## 2023-09-28 NOTE — TELEPHONE ENCOUNTER
Appointment Change  Cancel, Reschedule, Change to Virtual      Who are you speaking with? Patient   If it is not the patient, is the caller listed on the communication consent form? N/A   Which provider is the appointment scheduled with? Dr. Stephanie Valdez   When was the original appointment scheduled? Please list date and time 10/09/23 19AM   At which location is the appointment scheduled to take place? [id number]   Was the appointment rescheduled? Was the appointment changed from an in person visit to a virtual visit? If so, please list the details of the change. 10/10/23 10AM   What is the reason for the appointment change?  Provider

## 2023-09-29 ENCOUNTER — APPOINTMENT (OUTPATIENT)
Dept: LAB | Facility: HOSPITAL | Age: 53
End: 2023-09-29
Payer: COMMERCIAL

## 2023-09-29 DIAGNOSIS — C18.9 COLON ADENOCARCINOMA (HCC): ICD-10-CM

## 2023-09-29 DIAGNOSIS — C18.9 COLON ADENOCARCINOMA (HCC): Primary | ICD-10-CM

## 2023-09-29 LAB
ALBUMIN SERPL BCP-MCNC: 4.6 G/DL (ref 3.5–5)
ALP SERPL-CCNC: 78 U/L (ref 34–104)
ALT SERPL W P-5'-P-CCNC: 32 U/L (ref 7–52)
ANION GAP SERPL CALCULATED.3IONS-SCNC: 9 MMOL/L
AST SERPL W P-5'-P-CCNC: 30 U/L (ref 13–39)
BASOPHILS # BLD AUTO: 0.05 THOUSANDS/ÂΜL (ref 0–0.1)
BASOPHILS NFR BLD AUTO: 1 % (ref 0–1)
BILIRUB SERPL-MCNC: 0.87 MG/DL (ref 0.2–1)
BUN SERPL-MCNC: 18 MG/DL (ref 5–25)
CALCIUM SERPL-MCNC: 9.9 MG/DL (ref 8.4–10.2)
CHLORIDE SERPL-SCNC: 100 MMOL/L (ref 96–108)
CO2 SERPL-SCNC: 29 MMOL/L (ref 21–32)
CREAT SERPL-MCNC: 1.1 MG/DL (ref 0.6–1.3)
EOSINOPHIL # BLD AUTO: 0.19 THOUSAND/ÂΜL (ref 0–0.61)
EOSINOPHIL NFR BLD AUTO: 3 % (ref 0–6)
ERYTHROCYTE [DISTWIDTH] IN BLOOD BY AUTOMATED COUNT: 16.7 % (ref 11.6–15.1)
GFR SERPL CREATININE-BSD FRML MDRD: 57 ML/MIN/1.73SQ M
GLUCOSE SERPL-MCNC: 90 MG/DL (ref 65–140)
HCT VFR BLD AUTO: 36.7 % (ref 34.8–46.1)
HGB BLD-MCNC: 12.9 G/DL (ref 11.5–15.4)
IMM GRANULOCYTES # BLD AUTO: 0.08 THOUSAND/UL (ref 0–0.2)
IMM GRANULOCYTES NFR BLD AUTO: 1 % (ref 0–2)
LYMPHOCYTES # BLD AUTO: 2.77 THOUSANDS/ÂΜL (ref 0.6–4.47)
LYMPHOCYTES NFR BLD AUTO: 40 % (ref 14–44)
MCH RBC QN AUTO: 33.1 PG (ref 26.8–34.3)
MCHC RBC AUTO-ENTMCNC: 35.1 G/DL (ref 31.4–37.4)
MCV RBC AUTO: 94 FL (ref 82–98)
MONOCYTES # BLD AUTO: 0.58 THOUSAND/ÂΜL (ref 0.17–1.22)
MONOCYTES NFR BLD AUTO: 8 % (ref 4–12)
NEUTROPHILS # BLD AUTO: 3.22 THOUSANDS/ÂΜL (ref 1.85–7.62)
NEUTS SEG NFR BLD AUTO: 47 % (ref 43–75)
NRBC BLD AUTO-RTO: 0 /100 WBCS
PLATELET # BLD AUTO: 284 THOUSANDS/UL (ref 149–390)
PMV BLD AUTO: 9.8 FL (ref 8.9–12.7)
POTASSIUM SERPL-SCNC: 4.3 MMOL/L (ref 3.5–5.3)
PROT SERPL-MCNC: 7.6 G/DL (ref 6.4–8.4)
RBC # BLD AUTO: 3.9 MILLION/UL (ref 3.81–5.12)
SODIUM SERPL-SCNC: 138 MMOL/L (ref 135–147)
WBC # BLD AUTO: 6.89 THOUSAND/UL (ref 4.31–10.16)

## 2023-09-29 PROCEDURE — 36415 COLL VENOUS BLD VENIPUNCTURE: CPT

## 2023-09-29 PROCEDURE — 85025 COMPLETE CBC W/AUTO DIFF WBC: CPT

## 2023-09-29 PROCEDURE — 80053 COMPREHEN METABOLIC PANEL: CPT

## 2023-09-30 NOTE — PROGRESS NOTES
Telemedicine consent    Patient: Lito Villa  Provider: Sammi Blcak MD  Provider located at [address] [place] [id number]    The patient was identified by name and date of birth. Lito Villa was informed that this is a telemedicine visit and that the visit is being conducted through Telephone. My office door was closed. No one else was in the room. She acknowledged consent and understanding of privacy and security of the video platform. The patient has agreed to participate and understands they can discontinue the visit at any time. Patient is aware this is a billable service. Hematology/Oncology Outpatient Office Note    Date of Service: 10/9/2023    [facility] [employer]  [phone]    Reason for Consultation:   No chief complaint on file. Cancer Pathologic Stage at diagnosis: IIIA    Referral Physician: No ref. provider found    Primary Care Physician:  Janae Rea DO     Nickname: Jonnie Cheung    Spouse: Jonathan Yen ECO     Today's ECO    Goals and Barriers:  Current Goal: Minimize effects of disease burden, extend life. Barriers to accomplishing this: None    Patient's Capacity to Self Care:  Patient is able to self care      ASSESSMENT & PLAN      Diagnosis ICD-10-CM Associated Orders   1. Colon adenocarcinoma ([address])  C18.9             This is a 46 y.o. c PMHx notable for sinus congestion, being seen in consultation for early stage colon adenocarcinoma      Discussion of decision making  • Oncology history updated, accordingly, during this visit  • Goals of care/patient communication  o I discussed with the patient the clinical course leading up to their cancer diagnosis.  I reviewed relevant office notes, imaging reports and pathology result as well.  o I told the patient that this is a case of curable disease and what this means. We discussed that the goal of anti-cancer therapy is to provide best quality of life, extend overall survival, and progression free survival as shown in clinical trials. We also discussed that there might be a point when the cancer will no longer respond to this anti-neoplastic therapy.   o I explained the risks/benefits of the proposed cancer therapy: CAPOX for 3 months and after discussion including understanding risks of possible life-threatening complications and therapy-related malignancy development, informed consent for blood products and treatment has been signed and obtained. • TNM/Staging At Diagnosis  Cancer Staging  Colon adenocarcinoma [facility])  Staging form: Colon and Rectum, AJCC 8th Edition  - Pathologic: Stage IIIA (pT1, pN1, cM0) - Signed by Shad Bradley MD on 7/6/2023  • Disease Features/Tumor Markers/Genetics  o Tumor Marker: 7/21/2023 CEA 0.8  o Notable Path Features: MMR-proficient, 1/19 LN involved  • Treatment: CAPOX  • Other Supportive care:   • Treatment Team Members  o Surgeon: Dr. Heike Moore  o Palliative  • Labs: 7/21/2023: Hgb 13.3, WBC 5k, plt 240k  • Diagnostics  6/11/2023 CT CAP w/c: No evidence for metastatic disease in the chest, abdomen, or pelvis  7/28/2023: signatera negative  9/1/2023 signatera negative    This is a patient with Stage III L sided colon cancer with only one high risk features (LN). The patient is MMR-proficient. From an overall performance status basis, I believe the patient can tolerate systemic treatment. Prognosis is more hopeful as we are dealing with an originating left sided. Thoughts on approach to systemic therapy in the first line and subsequent lines of therapy: If no adjuvant therapy or if Xeloda/5-FU done: The next stage in the evolution of adjuvant therapy involved the evaluation of 5-FU with leucovorin in several key trials.  The NSABP C-03 study72 reported a 3-year disease-free survival (DFS) rate of 73% for patients receiving 5-FU/leucovorin, compared with a rate of 64% for those who received a combination of the alkylating nitrosourea lomustine, the alkaloid vincristine, and 5-FU (MOF; P = .0004). The IMPACT (International Multicenter Pooled Analysis of Colorectal Cancer Trials)73 pooled data from 3 randomized trials that investigated high-dose 5-FU/leucovorin compared with no adjuvant therapy. 5-FU/leucovorin reduced mortality by 22% (P = .029) and CRC events by 35% (P < .0001) compared with no adjuvant therapy    In the X-ACT (Xeloda in Adjuvant Colon Cancer Therapy) trial,78 patients were randomized to capecitabine or the [facility] regimen. There were no statistically significant differences between the 2 arms in terms of DFS (64.2% vs. 60.6%, respectively; P = NS) or OS (81.3% and 77.6%; P = .05). However, capecitabine was associated with significantly fewer adverse events than the [facility] regimen (P < .001). The NSABP C-06 study,79 which compared tegafur with leucovorin versus the [facility] regimen, reported that 5-year DFS (68.2% vs. 67.0%, respectively; P = NS) and OS (78.7% vs. 78.5%; P = NS) were similar for the 2 treatments. IDEA trial  We went over the IDEA trial which showed that 3 months of CAPOX was non-inferior to 6 months of mFOLFOX6 in all but high risk Stage 3 colon cancer. The absolute 0.4% difference in 5-year overall survival was noted. In high risk stage III colon cancers like her (T4), there would be nearly 1 more person out of a 100 alive at 5 years than otherwise would have . Grade ? 2 neurotoxicity during active therapy and in the first month after stopping study treatment occurred in 16.0% of patients in the 3-month group vs 44.5% of those in the 6-month group (CAPOX vs mFOLFOX6).       Based on  ACCENT/IDEA pooled analysis of 11 trials, Raina et al suggested that early oxaliplatin discontinuation after 50% of the intended treatment was given (usually reason of Grade 1-2 neuropathy) did not impact 3 year DFS or 5 year OS (note that these numbers were negatively affected when <50% of intended oxaliplatin was given). 2.5% of pts had grade 3/4 neuropathy after 3 months of chemotherapy versus 15.9% who had 6 months. As a result, it is reasonable to discontinue oxaliplatin after 3 months for most patients      Survival Rates    In [facility], patients with stage II or III disease were randomly assigned to receive adjuvant FOLFOX4 or 5-FU/leucovorin. After a median follow-up of 9.5 years, 10-year overall survival among all 2,246 patients was 71.7% in the FOLFOX4 group vs 67.1% in the 5-FU/leucovorin group (hazard ratio [HR] = 0.85, P = .043), 78.4% vs 79.5% in those with stage II disease (HR = 1.00, P = .980), and 67.1% vs 59.0% in those with stage III disease (HR = 0.80, P = .016). PO option: Xeloda 6344-0774 mg/m2 BID for 12 weeks (14 days on and 7 days off). Can start at 500 mg/m2 initially to work-up along with Oxaliplatin 135 mg/m2 q3 weeks        Discussion of disease response monitoring: We discussed with the patient at length the role of imaging and potential blood monitoring of burden of disease. In addition, we discussed at length the role of increasingly recognized peripheral blood monitoring of disease burden. Cell-free Circulating Tumor DNA Variant Allele Frequency has been associated with survival in metastatic cancer, albeit mainly determined in retrospective studies to date (https://pubmed.ncbi.nlm.nih.gov/28550664/). The patient had all of their questions and concerns answered and we will elect to pursue this. Restaging CT Chest/abdomen/pelvis (CAP) scans will be planned q6 months for 5 years. H&P q3-6 months in the first 2 years and then q6 months thereafter until year 5. CEA q6 months.  C-scope 12 months after surgery (3-6 months if there was a pre-obstructing lesion prior to surgery not allowing c-scope to be done)      Discussion of decision making    I personally reviewed the following lab results, the image studies, pathology, other specialty/physicians consult notes and recommendations, and outside medical records. I had a lengthy discussion with the patient and shared the work-up findings. We discussed the diagnosis and management plan as below. I spent 42 minutes reviewing the records (labs, clinician notes, outside records, medical history, ordering medicine/tests/procedures, interpreting the imaging/labs previously done) and coordination of care as well as direct time with the patient today, of which greater than 50% of the time was spent in counseling and coordination of care with the patient/family. · Plan/Labs  · Completing Xeloda (last week) for 4 total cycles, C4 now and then she will be done  · Restaging CT CAP scheduled 12/11/2023  · Signatera surveillance being 5 times until 5/2024  · F/u Surg-Onc for post surgery care, will be due for 1 year C-scope 6/2024        Follow Up: 12/13/2023    All questions were answered to the patient's satisfaction during this encounter. The patient knows the contact information for our office and knows to reach out for any relevant concerns related to this encounter. They are to call for any temperature 100.4 or higher, new symptoms including but not restricted to shaking chills, decreased appetite, nausea, vomiting, diarrhea, increased fatigue, shortness of breath or chest pain, confusion, and not feeling the strength to come to the clinic. For all other listed problems and medical diagnosis in their chart - they are managed by PCP and/or other specialists, which the patient acknowledges. Thank you very much for your consultation and making us a part of this patient's care. We are continuing to follow closely with you. Please do not hesitate to reach out to me with any additional questions or concerns. Hortencia Wilson MD  Hematology & Medical Oncology Staff Physician Disclaimer: This document was prepared using [employer] technology. If a word or phrase is confusing, or does not make sense, this is likely due to recognition error which was not discovered during this clinician's review. If you believe an error has occurred, please contact me through [phone] [facility] line for elias? cation. ONCOLOGY HISTORY OF PRESENT ILLNESS        Oncology History   Colon adenocarcinoma ([address])   5/23/2023 Biopsy    A. Transverse colon polyp cold snare, polypectomy:  -   Fragments of sessile serrated adenoma (deeper levels examined). -   Separate scant fragments of tubular adenoma, likely tissue contaminant. B. Sigmoid colon polyp hot snare, polypectomy:  -   Invasive adenocarcinoma, moderately differentiated, arising in fragments of traditional serrated adenoma. -   MMR (MLH1, MSH2, MSH6 and PMS2) studies by immunohistochemistry on B2 are pending.  -   See comment and synoptic report. 6/8/2023 Initial Diagnosis    Colon adenocarcinoma ([address])     6/23/2023 Surgery    A. Sigmoid colon, sigmoidectomy:  -   Sigmoid colon with tattoo pigment and multinucleated giant cell reaction.  -   Negative for residual adenoma and carcinoma. -   Resection margins are negative for adenoma and carcinoma. -   Metastatic adenocarcinoma involving one of 19 lymph nodes (1/19, pN1a).      7/6/2023 -  Cancer Staged    Staging form: Colon and Rectum, AJCC 8th Edition  - Pathologic: Stage IIIA (pT1, pN1, cM0) - Signed by Corinne Gold MD on 7/6/2023 7/31/2023 - 8/21/2023 Chemotherapy    alteplase (CATHFLO), 2 mg, Intracatheter, Every 1 Minute as needed, 2 of 4 cycles  oxaliplatin (ELOXATIN) chemo infusion, 245.7 mg (135 % of original dose 100 mg/m2), Intravenous, Once, 2 of 4 cycles  Dose modification: 135 mg/m2 (original dose 100 mg/m2, Cycle 1, Reason: Dose modified as per discussion with consulting physician)  Administration: 250 mg (7/31/2023), 250 mg (8/21/2023) Status post robotic sigmoidectomy [6/26/2023-Dr. Locke].  Mismatch repair protein intact. Her cancer was identified by routine colonoscopy [3/24/23].  Results of the colonoscopy with biopsy of one of the 2 polyps (sigmoid) that were removed showed invasive adenocarcinoma, moderately differentiated, arising in fragments of traditional serrated adenoma.  Patient underwent subsequent sigmoid colectomy which showed metastatic adenocarcinoma involving one of 19 lymph nodes (1/19, pN1a). Patient denies any complications.  Denies any blood in stool or urine    CAPOX (Capecitabine 1000 mg/m2) was given q3 weeks for 2 cycles, and then discontinued oxaliplatin as she had 2 negative Signatera ct DNA and she decided to not pursue any further Oxaliplatin as a result due to neuropathy concerns    SUBJECTIVE  (INTERVAL HISTORY)      Clotting History None   Bleeding History None   Cancer History Colon   Family Cancer History pGrandfather (colon), mGrandmother (breast), mAunt (breast), mAunt (uterine)   H/O Blood/Plt Transfusion None   Tobacco/etoh/drug abuse 15-pack-year smoking history [quit 2012]    Hx COVID19 Infection and Vaccine Status January 2022 COVID, vaccinated    Cancer Screening history up-to-date,  Next colonoscopy due 6/2024   Occupation retired, was working for [employer]     9/11/2023: C3 Xeloda  Week of 10/2/2023: C4 Xeloda    She's tolerating treatment well and has one week left. Some days are better than others. She did some jogging this morning. NO persistent neuropathy. I have reviewed the relevant past medical, surgical, social and family history. I have also reviewed allergies and medications for this patient. Review of Systems    Baseline weight: 155 lbs    Denies F/C, N/V, SOB, CP, LH, HA, rash, itching, gen weakness, melena, hematuria, hematochezia, falls, diarrhea, or constipation       A 10-point review of system was performed, pertinent positive and negative were detailed as above. Otherwise, the 10-point review of system was negative. Past Medical History:   Diagnosis Date   • Abnormal Pap smear of cervix    • Sinus congestion     chronic   • Varicella        Past Surgical History:   Procedure Laterality Date   • BREAST EXCISIONAL BIOPSY Right 2008    benign   • FOOT NEUROMA SURGERY Left    • FOOT SURGERY     • HEMICOLOECTOMY W/ ANASTOMOSIS N/A 2023    Procedure: LAPAROSCOPIC SIGMOID COLECTOMY WITH ROBOTICS, FLEXIBLE PROCTOSCOPY;  Surgeon: Billy King MD;  Location: [facility];  Service: Surgical Oncology   • US BREAST NEEDLE LOC RIGHT Right 10/08/2008       Family History   Problem Relation Age of Onset   • Diabetes Mother    • No Known Problems Father    • Breast cancer Maternal Grandmother         50-60's   • Stroke Maternal Grandmother    • Diabetes Maternal Grandmother    • Cancer Maternal Grandfather    • No Known Problems Paternal Grandmother    • No Known Problems Brother    • No Known Problems Half-Sister    • Breast cancer Maternal Aunt         68-70   • Endometrial cancer Maternal Aunt         maybe 40's   • No Known Problems Maternal Aunt    • Colon cancer Neg Hx    • Ovarian cancer Neg Hx        Social History     Socioeconomic History   • Marital status: /Civil Union     Spouse name: Not on file   • Number of children: Not on file   • Years of education: Not on file   • Highest education level: Not on file   Occupational History   • Not on file   Tobacco Use   • Smoking status: Former     Types: Cigarettes     Quit date:      Years since quittin.7   • Smokeless tobacco: Never   Vaping Use   • Vaping Use: Never used   Substance and Sexual Activity   • Alcohol use: Yes     Comment: social   • Drug use: Never   • Sexual activity: Yes     Partners: Male     Birth control/protection: I.U.D.      Comment: no new partner in past year   Other Topics Concern   • Not on file   Social History Narrative   • Not on file     Social Determinants of Health Financial Resource Strain: Not on file   Food Insecurity: Not on file   Transportation Needs: Not on file   Physical Activity: Not on file   Stress: Not on file   Social Connections: Not on file   Intimate Partner Violence: Not on file   Housing Stability: Not on file       No Known Allergies    Current Outpatient Medications   Medication Sig Dispense Refill   • capecitabine (XELODA) 150 MG tablet TAKE 2 TABLETS BY MOUTH 2 TIMES A DAY FOR 2 WEEKS ON FOLLOWED BY 1 WEEK OFF 56 tablet 2   • capecitabine (XELODA) 500 MG tablet TAKE 3 TABLETS (1500 MG) BY MOUTH 2 TIMES A DAY FOR 2 WEEKS ON FOLLOWED BY 1 WEEK OFF. 84 tablet 2   • celecoxib (CeleBREX) 200 mg capsule Take 1 capsule (200 mg total) by mouth daily for 7 days (Patient not taking: Reported on 7/6/2023) 7 capsule 0   • cholecalciferol (VITAMIN D3) 1,000 units tablet Take 2,000 Units by mouth daily     • enoxaparin (Lovenox) 40 mg/0.4 mL Inject 0.4 mL (40 mg total) under the skin in the morning for 25 days (Patient not taking: Reported on 8/7/2023) 10 mL 0   • gabapentin (NEURONTIN) 300 mg capsule Take 1 capsule (300 mg total) by mouth 3 (three) times a day for 7 days (Patient not taking: Reported on 7/6/2023) 21 capsule 0   • Levonorgestrel (MIRENA) 20 MCG/DAY IUD 1 each by Intrauterine route once     • OLANZapine (ZyPREXA) 5 mg tablet take 1 tablet by mouth at bedtime 30 tablet 0   • prochlorperazine (COMPAZINE) 10 mg tablet Take 1 tablet (10 mg total) by mouth every 8 (eight) hours as needed for nausea or vomiting 30 tablet 0     No current facility-administered medications for this visit. (Not in a hospital admission)      Objective:     24 Hour Vitals Assessment:     There were no vitals filed for this visit. Weight at last visit: 154 lbs  Weight today: not updated as this was a televisit    PHYSICIAN EXAM:    General: Appearance: alert, cooperative, no distress. HEENT: Normocephalic, atraumatic. No scleral icterus. conjunctivae clear. EOMI.  Chest: No tenderness to palpation. No open wound noted. Lungs: Clear to auscultation bilaterally, Respirations unlabored. Cardiac: Regular rate and rhythm, +S1and S2  Abdomen: Soft, non-tender, non-distended. Bowel sounds are normal.   Extremities:  No edema, cyanosis, clubbing. Skin: Skin color, turgor are normal. No rashes. Lymphatics: no palpable supra-cervical, axillary, or inguinal adenopathy  Neurologic: Awake, Alert, and oriented, no gross focal deficits noted b/l. DATA REVIEW:    Pathology Result:    Final Diagnosis   Date Value Ref Range Status   06/23/2023   Final    A. Sigmoid colon, sigmoidectomy:  -   Sigmoid colon with tattoo pigment and multinucleated giant cell reaction.  -   Negative for residual adenoma and carcinoma. -   Resection margins are negative for adenoma and carcinoma. -   Metastatic adenocarcinoma involving one of 19 lymph nodes (1/19, pN1a). Interpretation performed at [address] ([place]), [facility]      05/23/2023   Final    A. Transverse colon polyp cold snare, polypectomy:  -   Fragments of sessile serrated adenoma (deeper levels examined). -   Separate scant fragments of tubular adenoma, likely tissue contaminant. B. Sigmoid colon polyp hot snare, polypectomy:  -   Invasive adenocarcinoma, moderately differentiated, arising in fragments of traditional serrated adenoma. -   MMR (MLH1, MSH2, MSH6 and PMS2) studies by immunohistochemistry on B2 are pending.  -   See comment and synoptic report. Comment:   Part B) CD31/CKC immunostain on B3 is negative for lymphovascular invasion. Select slide B2 is reviewed by Dr. Danni Jaimes who concurs with the diagnosis. Diagnosis is communicated via [employer] to Dr. Herminia Stoddard on 6/1/2023 at [address] Interpretation performed at [facility] [address] [place], [address]           Image Results:   Image result are reviewed and documented in Hematology/Oncology history    CT chest abdomen pelvis w contrast  Narrative: CT CHEST, ABDOMEN AND PELVIS WITH IV CONTRAST    INDICATION:   C18.9: Malignant neoplasm of colon, unspecified. COMPARISON:  None. TECHNIQUE: CT examination of the chest, abdomen and pelvis was performed. Multiplanar 2D reformatted images were created from the source data. This examination, like all CT scans performed in the [facility], was performed utilizing techniques to minimize radiation dose exposure, including the use of iterative reconstruction and automated exposure control. Radiation dose length   product (DLP) for this visit:  717.16 mGy-cm    IV Contrast:  80 mL of iohexol (OMNIPAQUE)  Enteric Contrast: Enteric contrast was not administered. FINDINGS:    CHEST    LUNGS: No worrisome pulmonary nodule. There is no tracheal or endobronchial lesion. PLEURA:  Unremarkable. HEART/GREAT VESSELS: Heart is unremarkable for patient's age. No thoracic aortic aneurysm. MEDIASTINUM AND BRYAN:  Unremarkable. CHEST WALL AND LOWER NECK:  Unremarkable. ABDOMEN    LIVER/BILIARY TREE:  Unremarkable. GALLBLADDER:  No calcified gallstones. No pericholecystic inflammatory change. SPLEEN:  Unremarkable. PANCREAS:  Unremarkable. ADRENAL GLANDS:  Unremarkable. KIDNEYS/URETERS: One or more sharply circumscribed subcentimeter renal hypodensities are noted. These lesions are too small to accurately characterize, but are statistically most likely to represent benign cortical renal cyst(s). According to the   guidelines published in the Rose Rodriguez Paper of the ACR Incidental Findings Committee (Radiology 2010), no further workup of these lesions is recommended. No hydronephrosis. STOMACH AND BOWEL:  Unremarkable. APPENDIX:  No findings to suggest appendicitis. ABDOMINOPELVIC CAVITY:  No ascites. No pneumoperitoneum. No lymphadenopathy. VESSELS:  Unremarkable for patient's age.     PELVIS    REPRODUCTIVE ORGANS: There is an intrauterine device in the uterus. URINARY BLADDER:  Unremarkable. ABDOMINAL WALL/INGUINAL REGIONS:  Unremarkable. OSSEOUS STRUCTURES:  No acute fracture or destructive osseous lesion. Impression: No evidence for metastatic disease in the chest, abdomen, or pelvis. Workstation performed: [id number]      LABS:  [facility]. Lab Results   Component Value Date    HGB 12.9 09/29/2023    HCT 36.7 09/29/2023    MCV 94 09/29/2023     09/29/2023    WBC 6.89 09/29/2023    NRBC 0 09/29/2023     Lab Results   Component Value Date    K 4.3 09/29/2023     09/29/2023    CO2 29 09/29/2023    BUN 18 09/29/2023    CREATININE 1.10 09/29/2023    GLUF 95 09/09/2023    CALCIUM 9.9 09/29/2023    AST 30 09/29/2023    ALT 32 09/29/2023    ALKPHOS 78 09/29/2023    EGFR 57 09/29/2023       No results found for: "IRON", "TIBC", "FERRITIN"    No results found for: "[address]"    No results for input(s): "WBC", "CREAT", "PLT" in the last 72 hours.     By:  Primitivo Benavides MD, 10/9/2023, 1:30 PM

## 2023-10-02 ENCOUNTER — HOSPITAL ENCOUNTER (OUTPATIENT)
Dept: INFUSION CENTER | Facility: HOSPITAL | Age: 53
End: 2023-10-02
Attending: INTERNAL MEDICINE

## 2023-10-09 ENCOUNTER — TELEMEDICINE (OUTPATIENT)
Dept: HEMATOLOGY ONCOLOGY | Facility: CLINIC | Age: 53
End: 2023-10-09
Payer: COMMERCIAL

## 2023-10-09 DIAGNOSIS — C18.9 COLON ADENOCARCINOMA (HCC): Primary | ICD-10-CM

## 2023-10-09 PROCEDURE — 99215 OFFICE O/P EST HI 40 MIN: CPT | Performed by: INTERNAL MEDICINE

## 2023-10-10 ENCOUNTER — OFFICE VISIT (OUTPATIENT)
Dept: SURGICAL ONCOLOGY | Facility: CLINIC | Age: 53
End: 2023-10-10
Payer: COMMERCIAL

## 2023-10-10 VITALS
TEMPERATURE: 97.1 F | WEIGHT: 162 LBS | OXYGEN SATURATION: 98 % | SYSTOLIC BLOOD PRESSURE: 120 MMHG | HEART RATE: 65 BPM | DIASTOLIC BLOOD PRESSURE: 80 MMHG | BODY MASS INDEX: 25.43 KG/M2 | HEIGHT: 67 IN

## 2023-10-10 DIAGNOSIS — C18.9 COLON ADENOCARCINOMA (HCC): Primary | ICD-10-CM

## 2023-10-10 PROCEDURE — 99214 OFFICE O/P EST MOD 30 MIN: CPT | Performed by: STUDENT IN AN ORGANIZED HEALTH CARE EDUCATION/TRAINING PROGRAM

## 2023-10-10 NOTE — PROGRESS NOTES
Surgical Oncology Follow Up    [address] [facility] CANCER [facility]  [address]  [facility] [address] [phone]    Soumya Light  1970  [id number]    Diagnoses and all orders for this visit:    Colon adenocarcinoma [facility])        Chief Complaint   Patient presents with   • Follow-up       Return in about 3 months (around 1/10/2024). Oncology History   Colon adenocarcinoma ([address])   5/23/2023 Biopsy    A. Transverse colon polyp cold snare, polypectomy:  -   Fragments of sessile serrated adenoma (deeper levels examined). -   Separate scant fragments of tubular adenoma, likely tissue contaminant. B. Sigmoid colon polyp hot snare, polypectomy:  -   Invasive adenocarcinoma, moderately differentiated, arising in fragments of traditional serrated adenoma. -   MMR (MLH1, MSH2, MSH6 and PMS2) studies by immunohistochemistry on B2 are pending.  -   See comment and synoptic report. 6/8/2023 Initial Diagnosis    Colon adenocarcinoma ([address])     6/23/2023 Surgery    A. Sigmoid colon, sigmoidectomy:  -   Sigmoid colon with tattoo pigment and multinucleated giant cell reaction.  -   Negative for residual adenoma and carcinoma. -   Resection margins are negative for adenoma and carcinoma. -   Metastatic adenocarcinoma involving one of 19 lymph nodes (1/19, pN1a).      7/6/2023 -  Cancer Staged    Staging form: Colon and Rectum, AJCC 8th Edition  - Pathologic: Stage IIIA (pT1, pN1, cM0) - Signed by Susie Fraser MD on 7/6/2023 7/31/2023 - 8/21/2023 Chemotherapy    alteplase (CATHFLO), 2 mg, Intracatheter, Every 1 Minute as needed, 2 of 4 cycles  oxaliplatin (ELOXATIN) chemo infusion, 245.7 mg (135 % of original dose 100 mg/m2), Intravenous, Once, 2 of 4 cycles  Dose modification: 135 mg/m2 (original dose 100 mg/m2, Cycle 1, Reason: Dose modified as per discussion with consulting physician)  Administration: 250 mg (7/31/2023), 250 mg (8/21/2023)         Staging: T1N1 colon cancer  Treatment history: 6/2023 robot RT colectomy  Current treatment: postop  Disease status: postop    History of Present Illness: Interval f/u after RT hemicolectomy robo. Path T1N1. Did two cycles CAPOX and now is on xeloda alone. Completing 3 mo this week. Feels overall well. Stools several times a day but feels overall well. Recently ran in a ten mile race. Review of Systems  Complete ROS Surg Onc:   Constitutional: The patient denies new or recent history of general fatigue, no recent weight loss, no change in appetite. Eyes: No complaints of visual problems, no scleral icterus. ENT: no complaints of ear pain, no hoarseness, no difficulty swallowing,  no tinnitus and no new masses in head, oral cavity, or neck. Cardiovascular: No complaints of chest pain, no palpitations, no ankle edema. Respiratory: No complaints of shortness of breath, no cough. Gastrointestinal: No complaints of jaundice, no bloody stools, no pale stools. Genitourinary: No complaints of dysuria, no hematuria, no nocturia, no frequent urination, no urethral discharge. Musculoskeletal: No complaints of weakness, paralysis, joint stiffness or arthralgias. Integumentary: No complaints of rash, no new lesions. Neurological: No complaints of convulsions, no seizures, no dizziness. Hematologic/Lymphatic: No complaints of easy bruising. Endocrine:  No hot or cold intolerance. No polydipsia, polyphagia, or polyuria. Allergy/immunology:  No environmental allergies. No food allergies. Not immunocompromised. Skin:  No pallor or rash. No wound.         Patient Active Problem List   Diagnosis   • Colon adenocarcinoma [facility])     Past Medical History:   Diagnosis Date   • Abnormal Pap smear of cervix    • Sinus congestion     chronic   • Varicella      Past Surgical History:   Procedure Laterality Date   • BREAST EXCISIONAL BIOPSY Right 2008    benign   • FOOT NEUROMA SURGERY Left    • FOOT SURGERY     • [address] W/ ANASTOMOSIS N/A 2023    Procedure: LAPAROSCOPIC SIGMOID COLECTOMY WITH ROBOTICS, FLEXIBLE PROCTOSCOPY;  Surgeon: Corinne Gold MD;  Location: [facility];  Service: Surgical Oncology   • US BREAST NEEDLE LOC RIGHT Right 10/08/2008     Family History   Problem Relation Age of Onset   • Diabetes Mother    • No Known Problems Father    • Breast cancer Maternal Grandmother         50-60's   • Stroke Maternal Grandmother    • Diabetes Maternal Grandmother    • Cancer Maternal Grandfather    • No Known Problems Paternal Grandmother    • No Known Problems Brother    • No Known Problems Half-Sister    • Breast cancer Maternal Aunt         68-70   • Endometrial cancer Maternal Aunt         maybe 40's   • No Known Problems Maternal Aunt    • Colon cancer Neg Hx    • Ovarian cancer Neg Hx      Social History     Socioeconomic History   • Marital status: /Civil Union     Spouse name: Not on file   • Number of children: Not on file   • Years of education: Not on file   • Highest education level: Not on file   Occupational History   • Not on file   Tobacco Use   • Smoking status: Former     Types: Cigarettes     Quit date:      Years since quittin.7   • Smokeless tobacco: Never   Vaping Use   • Vaping Use: Never used   Substance and Sexual Activity   • Alcohol use: Yes     Comment: social   • Drug use: Never   • Sexual activity: Yes     Partners: Male     Birth control/protection: I.U.D.      Comment: no new partner in past year   Other Topics Concern   • Not on file   Social History Narrative   • Not on file     Social Determinants of Health     Financial Resource Strain: Not on file   Food Insecurity: Not on file   Transportation Needs: Not on file   Physical Activity: Not on file   Stress: Not on file   Social Connections: Not on file   Intimate Partner Violence: Not on file   Housing Stability: Not on file       Current Outpatient Medications:   •  capecitabine (XELODA) 150 MG tablet, TAKE 2 TABLETS BY MOUTH 2 TIMES A DAY FOR 2 WEEKS ON FOLLOWED BY 1 WEEK OFF, Disp: 56 tablet, Rfl: 2  •  capecitabine (XELODA) 500 MG tablet, TAKE 3 TABLETS (1500 MG) BY MOUTH 2 TIMES A DAY FOR 2 WEEKS ON FOLLOWED BY 1 WEEK OFF., Disp: 84 tablet, Rfl: 2  •  cholecalciferol (VITAMIN D3) 1,000 units tablet, Take 2,000 Units by mouth daily, Disp: , Rfl:   •  Levonorgestrel (MIRENA) 20 MCG/DAY IUD, 1 each by Intrauterine route once, Disp: , Rfl:   •  OLANZapine (ZyPREXA) 5 mg tablet, take 1 tablet by mouth at bedtime, Disp: 30 tablet, Rfl: 0  •  prochlorperazine (COMPAZINE) 10 mg tablet, Take 1 tablet (10 mg total) by mouth every 8 (eight) hours as needed for nausea or vomiting, Disp: 30 tablet, Rfl: 0  •  celecoxib (CeleBREX) 200 mg capsule, Take 1 capsule (200 mg total) by mouth daily for 7 days (Patient not taking: Reported on 7/6/2023), Disp: 7 capsule, Rfl: 0  •  enoxaparin (Lovenox) 40 mg/0.4 mL, Inject 0.4 mL (40 mg total) under the skin in the morning for 25 days (Patient not taking: Reported on 8/7/2023), Disp: 10 mL, Rfl: 0  •  gabapentin (NEURONTIN) 300 mg capsule, Take 1 capsule (300 mg total) by mouth 3 (three) times a day for 7 days (Patient not taking: Reported on 7/6/2023), Disp: 21 capsule, Rfl: 0  No Known Allergies  Vitals:    10/10/23 1002   BP: 120/80   Pulse: 65   Temp: (!) 97.1 °F (36.2 °C)   SpO2: 98%       Physical Exam  Constitutional: Patient is well-developed and well-nourished who appears the stated age in no acute distress. Patient is pleasant and talkative. HEENT:  Normocephalic. Sclerae are anicteric. Mucous membranes are moist. Neck is supple without adenopathy. No JVD. Chest: Equal chest rise, easy WOB  Cardiac: Heart is regular rate. Abdomen: Abdomen is non-tender, non-distended and without masses. Incisions healing well  Extremities: There is no clubbing or cyanosis. There is no edema. Symmetric.   Neuro: Grossly nonfocal. Gait is normal. Lymphatic: No evidence of cervical adenopathy bilaterally. No evidence of axillary adenopathy bilaterally. No evidence of inguinal adenopathy bilaterally. Skin: Warm, anicteric. Psych:  Patient is pleasant and talkative. Pathology:  As above    Labs:  Reviewed in Epic personally    Imaging  CT chest abdomen pelvis w contrast    Result Date: 6/12/2023  Narrative: CT CHEST, ABDOMEN AND PELVIS WITH IV CONTRAST INDICATION:   C18.9: Malignant neoplasm of colon, unspecified. COMPARISON:  None. TECHNIQUE: CT examination of the chest, abdomen and pelvis was performed. Multiplanar 2D reformatted images were created from the source data. This examination, like all CT scans performed in the [facility], was performed utilizing techniques to minimize radiation dose exposure, including the use of iterative reconstruction and automated exposure control. Radiation dose length product (DLP) for this visit:  717.16 mGy-cm IV Contrast:  80 mL of iohexol (OMNIPAQUE) Enteric Contrast: Enteric contrast was not administered. FINDINGS: CHEST LUNGS: No worrisome pulmonary nodule. There is no tracheal or endobronchial lesion. PLEURA:  Unremarkable. HEART/GREAT VESSELS: Heart is unremarkable for patient's age. No thoracic aortic aneurysm. MEDIASTINUM AND BRYAN:  Unremarkable. CHEST WALL AND LOWER NECK:  Unremarkable. ABDOMEN LIVER/BILIARY TREE:  Unremarkable. GALLBLADDER:  No calcified gallstones. No pericholecystic inflammatory change. SPLEEN:  Unremarkable. PANCREAS:  Unremarkable. ADRENAL GLANDS:  Unremarkable. KIDNEYS/URETERS: One or more sharply circumscribed subcentimeter renal hypodensities are noted. These lesions are too small to accurately characterize, but are statistically most likely to represent benign cortical renal cyst(s). According to the guidelines published in the [facility] Paper of the ACR Incidental Findings Committee (Radiology 2010), no further workup of these lesions is recommended.  No hydronephrosis. STOMACH AND BOWEL:  Unremarkable. APPENDIX:  No findings to suggest appendicitis. ABDOMINOPELVIC CAVITY:  No ascites. No pneumoperitoneum. No lymphadenopathy. VESSELS:  Unremarkable for patient's age. PELVIS REPRODUCTIVE ORGANS: There is an intrauterine device in the uterus. URINARY BLADDER:  Unremarkable. ABDOMINAL WALL/INGUINAL REGIONS:  Unremarkable. OSSEOUS STRUCTURES:  No acute fracture or destructive osseous lesion. Impression: No evidence for metastatic disease in the chest, abdomen, or pelvis. Workstation performed: [id number]       I reviewed the above laboratory and imaging data. Discussion/Summary:   48 yo female s/p robo RT gustavo 6/2023. T1N1. Completing 3 mo systemic therapy this week. Scans in Dec. Will f/u after this.

## 2023-11-03 ENCOUNTER — TELEPHONE (OUTPATIENT)
Dept: HEMATOLOGY ONCOLOGY | Facility: CLINIC | Age: 53
End: 2023-11-03

## 2023-11-03 NOTE — TELEPHONE ENCOUNTER
Appointment Change  Cancel, Reschedule, Change to Virtual      Who are you speaking with? Patient   If it is not the patient, is the caller listed on the communication consent form? N/A   Which provider is the appointment scheduled with? Dr. Holger Redyd   When was the original appointment scheduled? Please list date and time 1/2/24 9:00AM   At which location is the appointment scheduled to take place? [facility] [place]   Was the appointment rescheduled? Was the appointment changed from an in person visit to a virtual visit? If so, please list the details of the change. 1/3/24 9:30AM   What is the reason for the appointment change?  Provider

## 2023-12-03 NOTE — PROGRESS NOTES
Hematology/Oncology Outpatient Office Note    Date of Service: 2023    [facility]  [phone]    Reason for Consultation:   Chief Complaint   Patient presents with    Follow-up       Cancer Pathologic Stage at diagnosis: IIIA    Referral Physician: No ref. provider found    Primary Care Physician:  Caryn Benoit DO     Nickname: Edilia Jaramillo    Spouse: Deangelo Santacruz ECO     Today's ECO    Goals and Barriers:  Current Goal: Minimize effects of disease burden, extend life. Barriers to accomplishing this: None    Patient's Capacity to Self Care:  Patient is able to self care      ASSESSMENT & PLAN      Diagnosis ICD-10-CM Associated Orders   1. Colon adenocarcinoma ([address])  C18.9             This is a 46 y.o. c PMHx notable for sinus congestion, being seen in consultation for early stage colon adenocarcinoma      Discussion of decision making  Oncology history updated, accordingly, during this visit  Goals of care/patient communication  I discussed with the patient the clinical course leading up to their cancer diagnosis. I reviewed relevant office notes, imaging reports and pathology result as well. I told the patient that this is a case of curable disease and what this means. We discussed that the goal of anti-cancer therapy is to provide best quality of life, extend overall survival, and progression free survival as shown in clinical trials. We also discussed that there might be a point when the cancer will no longer respond to this anti-neoplastic therapy. I explained the risks/benefits of the proposed cancer therapy: CAPOX for 3 months and after discussion including understanding risks of possible life-threatening complications and therapy-related malignancy development, informed consent for blood products and treatment has been signed and obtained.   TNM/Staging At Diagnosis  Cancer Staging  Colon adenocarcinoma [facility])  Staging form: Colon and Rectum, AJCC 8th Edition  - Pathologic: Stage IIIA (pT1, pN1, cM0) - Signed by Demetra Farnsworth MD on 7/6/2023  Disease Features/Tumor Markers/Genetics  Tumor Marker: CEA   7/21/2023 0.8  12/4/2023: 1.4  Notable Path Features: MMR-proficient, 1/19 LN involved  Treatment: CAPOX  Other Supportive care:   Treatment Team Members  Surgeon: Dr. Aimee Muse: 7/21/2023: Hgb 13.3, WBC 5k, plt 240k  Diagnostics  6/11/2023 CT CAP w/c: No evidence for metastatic disease in the chest, abdomen, or pelvis  7/28/2023: signatera negative  9/1/2023 signatera negative  11/6/2023 signatera: negative  12/11/2023 CT CAP w/c: No evidence of metastatic disease in the chest, abdomen, or pelvis. This is a patient with Stage III L sided colon cancer with only one high risk features (LN). The patient is MMR-proficient. From an overall performance status basis, I believe the patient can tolerate systemic treatment. Prognosis is more hopeful as we are dealing with an originating left sided. Thoughts on approach to systemic therapy in the first line and subsequent lines of therapy: If no adjuvant therapy or if Xeloda/5-FU done: The next stage in the evolution of adjuvant therapy involved the evaluation of 5-FU with leucovorin in several key trials. The NSABP C-03 study72 reported a 3-year disease-free survival (DFS) rate of 73% for patients receiving 5-FU/leucovorin, compared with a rate of 64% for those who received a combination of the alkylating nitrosourea lomustine, the alkaloid vincristine, and 5-FU (MOF; P = .0004). The IMPACT (International Multicenter Pooled Analysis of Colorectal Cancer Trials)73 pooled data from 3 randomized trials that investigated high-dose 5-FU/leucovorin compared with no adjuvant therapy.  5-FU/leucovorin reduced mortality by 22% (P = .029) and CRC events by 35% (P < .0001) compared with no adjuvant therapy    In the X-ACT (Xeloda in Adjuvant Colon Cancer Therapy) trial,78 patients were randomized to capecitabine or the [facility] regimen. There were no statistically significant differences between the 2 arms in terms of DFS (64.2% vs. 60.6%, respectively; P = NS) or OS (81.3% and 77.6%; P = .05). However, capecitabine was associated with significantly fewer adverse events than the [facility] regimen (P < .001). The NSABP C-06 study,79 which compared tegafur with leucovorin versus the [facility] regimen, reported that 5-year DFS (68.2% vs. 67.0%, respectively; P = NS) and OS (78.7% vs. 78.5%; P = NS) were similar for the 2 treatments. IDEA trial  We went over the IDEA trial which showed that 3 months of CAPOX was non-inferior to 6 months of mFOLFOX6 in all but high risk Stage 3 colon cancer. The absolute 0.4% difference in 5-year overall survival was noted. In high risk stage III colon cancers like her (T4), there would be nearly 1 more person out of a 100 alive at 5 years than otherwise would have . Grade ? 2 neurotoxicity during active therapy and in the first month after stopping study treatment occurred in 16.0% of patients in the 3-month group vs 44.5% of those in the 6-month group (CAPOX vs mFOLFOX6). Based on  ACCENT/IDEA pooled analysis of 11 trials, Raina et al suggested that early oxaliplatin discontinuation after 50% of the intended treatment was given (usually reason of Grade 1-2 neuropathy) did not impact 3 year DFS or 5 year OS (note that these numbers were negatively affected when <50% of intended oxaliplatin was given). 2.5% of pts had grade 3/4 neuropathy after 3 months of chemotherapy versus 15.9% who had 6 months. As a result, it is reasonable to discontinue oxaliplatin after 3 months for most patients      Survival Rates    In [facility], patients with stage II or III disease were randomly assigned to receive adjuvant FOLFOX4 or 5-FU/leucovorin.  After a median follow-up of 9.5 years, 10-year overall survival among all 2,246 patients was 71.7% in the FOLFOX4 group vs 67.1% in the 5-FU/leucovorin group (hazard ratio [HR] = 0.85, P = .043), 78.4% vs 79.5% in those with stage II disease (HR = 1.00, P = .980), and 67.1% vs 59.0% in those with stage III disease (HR = 0.80, P = .016). PO option: Xeloda 5230-4294 mg/m2 BID for 12 weeks (14 days on and 7 days off). Can start at 500 mg/m2 initially to work-up along with Oxaliplatin 135 mg/m2 q3 weeks        Discussion of disease response monitoring: We discussed with the patient at length the role of imaging and potential blood monitoring of burden of disease. In addition, we discussed at length the role of increasingly recognized peripheral blood monitoring of disease burden. Cell-free Circulating Tumor DNA Variant Allele Frequency has been associated with survival in metastatic cancer, albeit mainly determined in retrospective studies to date (https://pubmed.ncbi.nlm.nih.gov/12436816/). The patient had all of their questions and concerns answered and we will elect to pursue this. Restaging CT Chest/abdomen/pelvis (CAP) scans will be planned q6 months for 5 years. H&P q3-6 months in the first 2 years and then q6 months thereafter until year 5. CEA q6 months. C-scope 12 months after surgery (3-6 months if there was a pre-obstructing lesion prior to surgery not allowing c-scope to be done)      Discussion of decision making    I personally reviewed the following lab results, the image studies, pathology, other specialty/physicians consult notes and recommendations, and outside medical records. I had a lengthy discussion with the patient and shared the work-up findings. We discussed the diagnosis and management plan as below.  I spent 32 minutes reviewing the records (labs, clinician notes, outside records, medical history, ordering medicine/tests/procedures, interpreting the imaging/labs previously done) and coordination of care as well as direct time with the patient today, of which greater than 50% of the time was spent in counseling and coordination of care with the patient/family. Plan/Labs  Restaging CT CAP ordered in 6 months along with preceding CEA, CMP  Signatera surveillance being 5 times until 5/2024  F/u Surg-Onc for post surgery care, will be due for 1 year C-scope 6/2024        Follow Up: 6 months    All questions were answered to the patient's satisfaction during this encounter. The patient knows the contact information for our office and knows to reach out for any relevant concerns related to this encounter. They are to call for any temperature 100.4 or higher, new symptoms including but not restricted to shaking chills, decreased appetite, nausea, vomiting, diarrhea, increased fatigue, shortness of breath or chest pain, confusion, and not feeling the strength to come to the clinic. For all other listed problems and medical diagnosis in their chart - they are managed by PCP and/or other specialists, which the patient acknowledges. Thank you very much for your consultation and making us a part of this patient's care. We are continuing to follow closely with you. Please do not hesitate to reach out to me with any additional questions or concerns. Hortencia Gentile MD  Hematology & Medical Oncology Staff Physician             Disclaimer: This document was prepared using [employer] Fluency Direct technology. If a word or phrase is confusing, or does not make sense, this is likely due to recognition error which was not discovered during this clinician's review. If you believe an error has occurred, please contact me through [phone] line for elias? cation. ONCOLOGY HISTORY OF PRESENT ILLNESS        Oncology History   Colon adenocarcinoma ([address])   5/23/2023 Biopsy    A. Transverse colon polyp cold snare, polypectomy:  -   Fragments of sessile serrated adenoma (deeper levels examined).      -   Separate scant fragments of tubular adenoma, likely tissue contaminant. B. Sigmoid colon polyp hot snare, polypectomy:  -   Invasive adenocarcinoma, moderately differentiated, arising in fragments of traditional serrated adenoma. -   MMR (MLH1, MSH2, MSH6 and PMS2) studies by immunohistochemistry on B2 are pending.  -   See comment and synoptic report. 6/8/2023 Initial Diagnosis    Colon adenocarcinoma ([address])     6/23/2023 Surgery    A. Sigmoid colon, sigmoidectomy:  -   Sigmoid colon with tattoo pigment and multinucleated giant cell reaction.  -   Negative for residual adenoma and carcinoma. -   Resection margins are negative for adenoma and carcinoma. -   Metastatic adenocarcinoma involving one of 19 lymph nodes (1/19, pN1a). 7/6/2023 -  Cancer Staged    Staging form: Colon and Rectum, AJCC 8th Edition  - Pathologic: Stage IIIA (pT1, pN1, cM0) - Signed by Ava Longoria MD on 7/6/2023 7/31/2023 - 8/21/2023 Chemotherapy    alteplase (CATHFLO), 2 mg, Intracatheter, Every 1 Minute as needed, 2 of 4 cycles  oxaliplatin (ELOXATIN) chemo infusion, 245.7 mg (135 % of original dose 100 mg/m2), Intravenous, Once, 2 of 4 cycles  Dose modification: 135 mg/m2 (original dose 100 mg/m2, Cycle 1, Reason: Dose modified as per discussion with consulting physician)  Administration: 250 mg (7/31/2023), 250 mg (8/21/2023)       Status post robotic sigmoidectomy [6/26/2023-Dr. Locke]. Mismatch repair protein intact. Her cancer was identified by routine colonoscopy [3/24/23]. Results of the colonoscopy with biopsy of one of the 2 polyps (sigmoid) that were removed showed invasive adenocarcinoma, moderately differentiated, arising in fragments of traditional serrated adenoma. Patient underwent subsequent sigmoid colectomy which showed metastatic adenocarcinoma involving one of 19 lymph nodes (1/19, pN1a). Patient denies any complications.   Denies any blood in stool or urine    CAPOX (Capecitabine 1000 mg/m2) was given q3 weeks for 2 cycles, and then discontinued oxaliplatin as she had 2 negative Signatera ct DNA and she decided to not pursue any further Oxaliplatin as a result due to neuropathy concerns    SUBJECTIVE  (INTERVAL HISTORY)      Clotting History None   Bleeding History None   Cancer History Colon   Family Cancer History pGrandfather (colon), mGrandmother (breast), mAunt (breast), mAunt (uterine)   H/O Blood/Plt Transfusion None   Tobacco/etoh/drug abuse 15-pack-year smoking history [quit 2012]    Hx COVID19 Infection and Vaccine Status January 2022 COVID, vaccinated    Cancer Screening history up-to-date,  Next colonoscopy due 6/2024   Occupation retired, was working for [employer]     9/11/2023: [address] [employer] 10/2/2023: C4 Xeloda    Doing very well, traveling to [place] for vacation. She did some jogging this morning. NO persistent neuropathy. I have reviewed the relevant past medical, surgical, social and family history. I have also reviewed allergies and medications for this patient. Review of Systems    Baseline weight: 155 lbs    Denies F/C, N/V, SOB, CP, LH, HA, rash, itching, gen weakness, melena, hematuria, hematochezia, falls, diarrhea, or constipation       A 10-point review of system was performed, pertinent positive and negative were detailed as above. Otherwise, the 10-point review of system was negative.       Past Medical History:   Diagnosis Date    Abnormal Pap smear of cervix     Sinus congestion     chronic    Varicella        Past Surgical History:   Procedure Laterality Date    BREAST EXCISIONAL BIOPSY Right 2008    benign    FOOT NEUROMA SURGERY Left     FOOT SURGERY      HEMICOLOECTOMY W/ ANASTOMOSIS N/A 6/23/2023    Procedure: LAPAROSCOPIC SIGMOID COLECTOMY WITH ROBOTICS, FLEXIBLE PROCTOSCOPY;  Surgeon: Corinne Gold MD;  Location: [facility];  Service: Surgical Oncology    US BREAST NEEDLE LOC RIGHT Right 10/08/2008       Family History   Problem Relation Age of Onset    Diabetes Mother     No Known Problems Father     Breast cancer Maternal Grandmother         50-60's    Stroke Maternal Grandmother     Diabetes Maternal Grandmother     Cancer Maternal Grandfather     No Known Problems Paternal Grandmother     No Known Problems Brother     No Known Problems Half-Sister     Breast cancer Maternal Aunt         68-70    Endometrial cancer Maternal Aunt         maybe 40's    No Known Problems Maternal Aunt     Colon cancer Neg Hx     Ovarian cancer Neg Hx        Social History     Socioeconomic History    Marital status: /Civil Union     Spouse name: Not on file    Number of children: Not on file    Years of education: Not on file    Highest education level: Not on file   Occupational History    Not on file   Tobacco Use    Smoking status: Former     Current packs/day: 0.00     Types: Cigarettes     Quit date:      Years since quittin.9    Smokeless tobacco: Never   Vaping Use    Vaping status: Never Used   Substance and Sexual Activity    Alcohol use: Yes     Comment: social    Drug use: Never    Sexual activity: Yes     Partners: Male     Birth control/protection: I.U.D.      Comment: no new partner in past year   Other Topics Concern    Not on file   Social History Narrative    Not on file     Social Determinants of Health     Financial Resource Strain: Low Risk  (2023)    Received from [employer]    Overall Financial Resource Strain (CARDIA)     Difficulty of Paying Living Expenses: Not hard at all   Food Insecurity: No Food Insecurity (2023)    Received from [employer]    Hunger Vital Sign     Worried About [place] in the Last Year: Never true     Ran Out of Food in the Last Year: Never true   Transportation Needs: No Transportation Needs (2023)    Received from [facility] [place] - Transportation     Lack of Transportation (Medical): No     Lack of Transportation (Non-Medical): No   Physical Activity: Sufficiently Active (11/6/2023)    Received from [address] [facility]     Days of Exercise per Week: 5 days     Minutes of Exercise per Session: 50 min   Stress: Stress Concern Present (11/6/2023)    Received from [facility] [address]     Feeling of Stress : To some extent   Social Connections: Not on file   Intimate Partner Violence: Not on file   Housing Stability: Low Risk  (11/6/2023)    Received from [address]     Unable to Pay for Housing in the Last Year: No     Number of Places Lived in the Last Year: 1     Unstable Housing in the Last Year: No       No Known Allergies    Current Outpatient Medications   Medication Sig Dispense Refill    cholecalciferol (VITAMIN D3) 1,000 units tablet Take 2,000 Units by mouth daily      Levonorgestrel (MIRENA) 20 MCG/DAY IUD 1 each by Intrauterine route once      celecoxib (CeleBREX) 200 mg capsule Take 1 capsule (200 mg total) by mouth daily for 7 days (Patient not taking: Reported on 7/6/2023) 7 capsule 0    enoxaparin (Lovenox) 40 mg/0.4 mL Inject 0.4 mL (40 mg total) under the skin in the morning for 25 days (Patient not taking: Reported on 8/7/2023) 10 mL 0    gabapentin (NEURONTIN) 300 mg capsule Take 1 capsule (300 mg total) by mouth 3 (three) times a day for 7 days (Patient not taking: Reported on 7/6/2023) 21 capsule 0    OLANZapine (ZyPREXA) 5 mg tablet take 1 tablet by mouth at bedtime (Patient not taking: Reported on 12/13/2023) 30 tablet 0    prochlorperazine (COMPAZINE) 10 mg tablet Take 1 tablet (10 mg total) by mouth every 8 (eight) hours as needed for nausea or vomiting (Patient not taking: Reported on 12/13/2023) 30 tablet 0     No current facility-administered medications for this visit.        (Not in a hospital admission)      Objective:     24 Hour Vitals Assessment:     Vitals:    12/13/23 0945   BP: 120/80   Pulse: (!) 52   Resp: 18   Temp: (!) 97.2 °F (36.2 °C)   SpO2: 97%     Weight at last visit: 154 lbs    Weight today: 160 lbs    PHYSICIAN EXAM:    General: Appearance: alert, cooperative, no distress. HEENT: Normocephalic, atraumatic. No scleral icterus. conjunctivae clear. EOMI. Chest: No tenderness to palpation. No open wound noted. Lungs: Clear to auscultation bilaterally, Respirations unlabored. Cardiac: Bradycardia, +S1and S2  Abdomen: Soft, non-tender, non-distended. Bowel sounds are normal.   Extremities:  No edema, cyanosis, clubbing. Skin: Skin color, turgor are normal. No rashes. Lymphatics: no palpable supra-cervical, axillary, or inguinal adenopathy  Neurologic: Awake, Alert, and oriented, no gross focal deficits noted b/l. DATA REVIEW:    Pathology Result:    Final Diagnosis   Date Value Ref Range Status   06/23/2023   Final    A. Sigmoid colon, sigmoidectomy:  -   Sigmoid colon with tattoo pigment and multinucleated giant cell reaction.  -   Negative for residual adenoma and carcinoma. -   Resection margins are negative for adenoma and carcinoma. -   Metastatic adenocarcinoma involving one of 19 lymph nodes (1/19, pN1a). Interpretation performed at [address] ([place]), [address] [facility]      05/23/2023   Final    A. Transverse colon polyp cold snare, polypectomy:  -   Fragments of sessile serrated adenoma (deeper levels examined). -   Separate scant fragments of tubular adenoma, likely tissue contaminant. B. Sigmoid colon polyp hot snare, polypectomy:  -   Invasive adenocarcinoma, moderately differentiated, arising in fragments of traditional serrated adenoma. -   MMR (MLH1, MSH2, MSH6 and PMS2) studies by immunohistochemistry on B2 are pending.  -   See comment and synoptic report.     Comment:   Part B) CD31/CKC immunostain on B3 is negative for lymphovascular invasion. Select slide B2 is reviewed by Dr. oMy Magana who concurs with the diagnosis. Diagnosis is communicated via [employer] to Dr. Scooter Wong on 6/1/2023 at [address] Interpretation performed at [facility] [address] [facility]           Image Results:   Image result are reviewed and documented in Hematology/Oncology history    CT chest abdomen pelvis w contrast  Narrative: CT CHEST, ABDOMEN AND PELVIS WITH IV CONTRAST    INDICATION:   C18.9: Malignant neoplasm of colon, unspecified. COMPARISON: 6/11/2023    TECHNIQUE: CT examination of the chest, abdomen and pelvis was performed. Multiplanar 2D reformatted images were created from the source data. This examination, like all CT scans performed in the [facility], was performed utilizing techniques to minimize radiation dose exposure, including the use of iterative reconstruction and automated exposure control. Radiation dose length   product (DLP) for this visit:  735.49 mGy-cm    IV Contrast:  80 mL of iohexol (OMNIPAQUE)  Enteric Contrast: Enteric contrast was administered. FINDINGS:    CHEST    LUNGS:  Lungs are clear. There is no tracheal or endobronchial lesion. PLEURA:  Unremarkable. HEART/GREAT VESSELS: Heart is unremarkable for patient's age. No thoracic aortic aneurysm. MEDIASTINUM AND BRYAN:  Unremarkable. CHEST WALL AND LOWER NECK:  Unremarkable. ABDOMEN    LIVER/BILIARY TREE: Stable subcentimeter hypodensity in the liver, likely a small cyst.    GALLBLADDER:  No calcified gallstones. No pericholecystic inflammatory change. SPLEEN:  Unremarkable. PANCREAS:  Unremarkable. ADRENAL GLANDS:  Unremarkable. KIDNEYS/URETERS: There is a stable small left renal cyst. No hydronephrosis. STOMACH AND BOWEL:  Unremarkable status post partial sigmoid resection. APPENDIX:  No findings to suggest appendicitis. ABDOMINOPELVIC CAVITY:  No ascites. No pneumoperitoneum. No lymphadenopathy. VESSELS:  Unremarkable for patient's age. PELVIS    REPRODUCTIVE ORGANS:  Unremarkable for patient's age. There is an IUD in place. URINARY BLADDER:  Unremarkable. ABDOMINAL WALL/INGUINAL REGIONS:  Unremarkable. OSSEOUS STRUCTURES:  No acute fracture or destructive osseous lesion. Impression: No evidence of metastatic disease in the chest, abdomen, or pelvis. Workstation performed: [id number]      LABS:  Lab data are reviewed and documented in HemOn history. Lab Results   Component Value Date    HGB 14.3 12/04/2023    HCT 42.6 12/04/2023     (H) 12/04/2023     12/04/2023    WBC 4.96 12/04/2023    NRBC 0 12/04/2023     Lab Results   Component Value Date    K 4.4 12/04/2023     12/04/2023    CO2 30 12/04/2023    BUN 17 12/04/2023    CREATININE 1.01 12/04/2023    GLUF 95 12/04/2023    CALCIUM 9.7 12/04/2023    AST 25 12/04/2023    ALT 22 12/04/2023    ALKPHOS 75 12/04/2023    EGFR 63 12/04/2023       No results found for: "IRON", "TIBC", "FERRITIN"    No results found for: "[employer]"    No results for input(s): "WBC", "CREAT", "PLT" in the last 72 hours.     By:  An Westfall MD, 12/13/2023, 9:56 AM

## 2023-12-04 ENCOUNTER — APPOINTMENT (OUTPATIENT)
Dept: LAB | Facility: HOSPITAL | Age: 53
End: 2023-12-04
Payer: COMMERCIAL

## 2023-12-04 ENCOUNTER — TELEPHONE (OUTPATIENT)
Dept: HEMATOLOGY ONCOLOGY | Facility: CLINIC | Age: 53
End: 2023-12-04

## 2023-12-04 DIAGNOSIS — C18.9 MALIGNANT NEOPLASM OF COLON, UNSPECIFIED PART OF COLON (HCC): ICD-10-CM

## 2023-12-04 DIAGNOSIS — F33.1 MAJOR DEPRESSIVE DISORDER, RECURRENT EPISODE, MODERATE (HCC): ICD-10-CM

## 2023-12-04 DIAGNOSIS — C18.9 COLON ADENOCARCINOMA (HCC): ICD-10-CM

## 2023-12-04 DIAGNOSIS — F43.10 POSTTRAUMATIC STRESS DISORDER: ICD-10-CM

## 2023-12-04 LAB
ALBUMIN SERPL BCP-MCNC: 4.5 G/DL (ref 3.5–5)
ALP SERPL-CCNC: 75 U/L (ref 34–104)
ALT SERPL W P-5'-P-CCNC: 22 U/L (ref 7–52)
ANION GAP SERPL CALCULATED.3IONS-SCNC: 9 MMOL/L
AST SERPL W P-5'-P-CCNC: 25 U/L (ref 13–39)
BASOPHILS # BLD AUTO: 0.03 THOUSANDS/ÂΜL (ref 0–0.1)
BASOPHILS NFR BLD AUTO: 1 % (ref 0–1)
BILIRUB SERPL-MCNC: 0.8 MG/DL (ref 0.2–1)
BUN SERPL-MCNC: 17 MG/DL (ref 5–25)
CALCIUM SERPL-MCNC: 9.7 MG/DL (ref 8.4–10.2)
CEA SERPL-MCNC: 1.4 NG/ML (ref 0–3)
CHLORIDE SERPL-SCNC: 102 MMOL/L (ref 96–108)
CHOLEST SERPL-MCNC: 187 MG/DL
CO2 SERPL-SCNC: 30 MMOL/L (ref 21–32)
CREAT SERPL-MCNC: 1.01 MG/DL (ref 0.6–1.3)
EOSINOPHIL # BLD AUTO: 0.12 THOUSAND/ÂΜL (ref 0–0.61)
EOSINOPHIL NFR BLD AUTO: 2 % (ref 0–6)
ERYTHROCYTE [DISTWIDTH] IN BLOOD BY AUTOMATED COUNT: 11.7 % (ref 11.6–15.1)
GFR SERPL CREATININE-BSD FRML MDRD: 63 ML/MIN/1.73SQ M
GLUCOSE P FAST SERPL-MCNC: 95 MG/DL (ref 65–99)
HCT VFR BLD AUTO: 42.6 % (ref 34.8–46.1)
HDLC SERPL-MCNC: 72 MG/DL
HGB BLD-MCNC: 14.3 G/DL (ref 11.5–15.4)
IMM GRANULOCYTES # BLD AUTO: 0.02 THOUSAND/UL (ref 0–0.2)
IMM GRANULOCYTES NFR BLD AUTO: 0 % (ref 0–2)
LDLC SERPL CALC-MCNC: 101 MG/DL (ref 0–100)
LYMPHOCYTES # BLD AUTO: 1.94 THOUSANDS/ÂΜL (ref 0.6–4.47)
LYMPHOCYTES NFR BLD AUTO: 39 % (ref 14–44)
MCH RBC QN AUTO: 34 PG (ref 26.8–34.3)
MCHC RBC AUTO-ENTMCNC: 33.6 G/DL (ref 31.4–37.4)
MCV RBC AUTO: 101 FL (ref 82–98)
MONOCYTES # BLD AUTO: 0.36 THOUSAND/ÂΜL (ref 0.17–1.22)
MONOCYTES NFR BLD AUTO: 7 % (ref 4–12)
NEUTROPHILS # BLD AUTO: 2.49 THOUSANDS/ÂΜL (ref 1.85–7.62)
NEUTS SEG NFR BLD AUTO: 51 % (ref 43–75)
NONHDLC SERPL-MCNC: 115 MG/DL
NRBC BLD AUTO-RTO: 0 /100 WBCS
PLATELET # BLD AUTO: 255 THOUSANDS/UL (ref 149–390)
PMV BLD AUTO: 11.1 FL (ref 8.9–12.7)
POTASSIUM SERPL-SCNC: 4.4 MMOL/L (ref 3.5–5.3)
PROT SERPL-MCNC: 7.2 G/DL (ref 6.4–8.4)
RBC # BLD AUTO: 4.21 MILLION/UL (ref 3.81–5.12)
SODIUM SERPL-SCNC: 141 MMOL/L (ref 135–147)
TRIGL SERPL-MCNC: 72 MG/DL
WBC # BLD AUTO: 4.96 THOUSAND/UL (ref 4.31–10.16)

## 2023-12-04 PROCEDURE — 80053 COMPREHEN METABOLIC PANEL: CPT

## 2023-12-04 PROCEDURE — 82378 CARCINOEMBRYONIC ANTIGEN: CPT

## 2023-12-04 PROCEDURE — 80061 LIPID PANEL: CPT

## 2023-12-04 PROCEDURE — 85025 COMPLETE CBC W/AUTO DIFF WBC: CPT

## 2023-12-04 PROCEDURE — 36415 COLL VENOUS BLD VENIPUNCTURE: CPT

## 2023-12-04 NOTE — TELEPHONE ENCOUNTER
Appointment Change  Cancel, Reschedule, Change to Virtual      Who are you speaking with? Patient   If it is not the patient, is the caller listed on the communication consent form? N/A   Which provider is the appointment scheduled with? Dr. Iron Perez   When was the original appointment scheduled? Please list date and time 01/03/24 9AM   At which location is the appointment scheduled to take place? [facility]   Was the appointment rescheduled? Was the appointment changed from an in person visit to a virtual visit? If so, please list the details of the change. 01/03/24 2:45PM   What is the reason for the appointment change?  Provider

## 2023-12-11 ENCOUNTER — HOSPITAL ENCOUNTER (OUTPATIENT)
Dept: CT IMAGING | Facility: HOSPITAL | Age: 53
Discharge: HOME | End: 2023-12-11
Attending: INTERNAL MEDICINE
Payer: COMMERCIAL

## 2023-12-11 DIAGNOSIS — C18.9 COLON ADENOCARCINOMA (HCC): ICD-10-CM

## 2023-12-11 PROCEDURE — G1004 CDSM NDSC: HCPCS

## 2023-12-11 PROCEDURE — 71260 CT THORAX DX C+: CPT

## 2023-12-11 PROCEDURE — 74177 CT ABD & PELVIS W/CONTRAST: CPT

## 2023-12-11 RX ADMIN — IOHEXOL 80 ML: 350 INJECTION, SOLUTION INTRAVENOUS at 07:14

## 2023-12-12 ENCOUNTER — TELEPHONE (OUTPATIENT)
Dept: HEMATOLOGY ONCOLOGY | Facility: CLINIC | Age: 53
End: 2023-12-12

## 2023-12-12 NOTE — TELEPHONE ENCOUNTER
Called the radiology reading room to have patient's scan read prior to her appointment tomorrow with Noemi Salazar 12/13.     CT-CAP 12/11 ACCESSION# [id number]

## 2023-12-13 ENCOUNTER — OFFICE VISIT (OUTPATIENT)
Dept: HEMATOLOGY ONCOLOGY | Facility: CLINIC | Age: 53
End: 2023-12-13
Payer: COMMERCIAL

## 2023-12-13 VITALS
BODY MASS INDEX: 25.11 KG/M2 | DIASTOLIC BLOOD PRESSURE: 80 MMHG | TEMPERATURE: 97.2 F | HEIGHT: 67 IN | HEART RATE: 52 BPM | SYSTOLIC BLOOD PRESSURE: 120 MMHG | OXYGEN SATURATION: 97 % | WEIGHT: 160 LBS | RESPIRATION RATE: 18 BRPM

## 2023-12-13 DIAGNOSIS — C18.9 COLON ADENOCARCINOMA (HCC): Primary | ICD-10-CM

## 2023-12-13 PROCEDURE — 99214 OFFICE O/P EST MOD 30 MIN: CPT | Performed by: INTERNAL MEDICINE

## 2023-12-29 PROBLEM — Z85.038 PERSONAL HISTORY OF COLON CANCER: Status: ACTIVE | Noted: 2023-06-08

## 2023-12-29 PROBLEM — Z08 ENCOUNTER FOR FOLLOW-UP SURVEILLANCE OF COLON CANCER: Status: ACTIVE | Noted: 2023-12-29

## 2023-12-29 PROBLEM — Z85.038 ENCOUNTER FOR FOLLOW-UP SURVEILLANCE OF COLON CANCER: Status: ACTIVE | Noted: 2023-12-29

## 2024-01-03 ENCOUNTER — OFFICE VISIT (OUTPATIENT)
Dept: SURGICAL ONCOLOGY | Facility: CLINIC | Age: 54
End: 2024-01-03
Payer: COMMERCIAL

## 2024-01-03 VITALS
SYSTOLIC BLOOD PRESSURE: 150 MMHG | WEIGHT: 160.4 LBS | OXYGEN SATURATION: 98 % | BODY MASS INDEX: 25.18 KG/M2 | TEMPERATURE: 98.5 F | HEART RATE: 70 BPM | DIASTOLIC BLOOD PRESSURE: 80 MMHG | HEIGHT: 67 IN

## 2024-01-03 DIAGNOSIS — Z08 ENCOUNTER FOR FOLLOW-UP SURVEILLANCE OF COLON CANCER: Primary | ICD-10-CM

## 2024-01-03 DIAGNOSIS — Z85.038 PERSONAL HISTORY OF COLON CANCER: ICD-10-CM

## 2024-01-03 DIAGNOSIS — Z85.038 ENCOUNTER FOR FOLLOW-UP SURVEILLANCE OF COLON CANCER: Primary | ICD-10-CM

## 2024-01-03 PROCEDURE — 99214 OFFICE O/P EST MOD 30 MIN: CPT | Performed by: STUDENT IN AN ORGANIZED HEALTH CARE EDUCATION/TRAINING PROGRAM

## 2024-01-03 NOTE — PROGRESS NOTES
Surgical Oncology Follow Up    [facility] [address] [id number] [phone]    Irma Beaulieuyer  1970  [id number]    Diagnoses and all orders for this visit:    Encounter for follow-up surveillance of colon cancer    Personal history of colon cancer        Chief Complaint   Patient presents with    Follow-up       No follow-ups on file.      Oncology History   Personal history of colon cancer   5/23/2023 Biopsy    A. Transverse colon polyp cold snare, polypectomy:  -   Fragments of sessile serrated adenoma (deeper levels examined).     -   Separate scant fragments of tubular adenoma, likely tissue contaminant.       B. Sigmoid colon polyp hot snare, polypectomy:  -   Invasive adenocarcinoma, moderately differentiated, arising in fragments of traditional serrated adenoma.  -   MMR (MLH1, MSH2, MSH6 and PMS2) studies by immunohistochemistry on B2 are pending.  -   See comment and synoptic report.     6/8/2023 Initial Diagnosis    Colon adenocarcinoma (HCC)     6/23/2023 Surgery    A. Sigmoid colon, sigmoidectomy:  -   Sigmoid colon with tattoo pigment and multinucleated giant cell reaction.  -   Negative for residual adenoma and carcinoma.   -   Resection margins are negative for adenoma and carcinoma.   -   Metastatic adenocarcinoma involving one of 19 lymph nodes (1/19, pN1a).     7/6/2023 -  Cancer Staged    Staging form: Colon and Rectum, AJCC 8th Edition  - Pathologic: Stage IIIA (pT1, pN1, cM0) - Signed by Ileana Locke MD on 7/6/2023 7/31/2023 - 8/21/2023 Chemotherapy    alteplase (CATHFLO), 2 mg, Intracatheter, Every 1 Minute as needed, 2 of 4 cycles  oxaliplatin (ELOXATIN) chemo infusion, 245.7 mg (135 % of original dose 100 mg/m2), Intravenous, Once, 2 of 4 cycles  Dose modification: 135 mg/m2 (original dose 100 mg/m2, Cycle 1, Reason: Dose modified as per discussion with consulting  physician)  Administration: 250 mg (7/31/2023), 250 mg (8/21/2023)         Staging: T1N1 colon cancer  Treatment history: 6/2023 robot RT colectomy  Current treatment: obs   Disease status: MARGARITA    History of Present Illness: Interval f/u after RT hemicolectomy robo. Path T1N1. Did two cycles CAPOX and then xeloda for total of 4 mo adjuvant treatment. Feels overall well. Stools after every meal. Doing well overall but has been crampy last several days. No blood etc.  Scan MARGARITA on 12/2023.    Review of Systems  Complete ROS Surg Onc:   Constitutional: The patient denies new or recent history of general fatigue, no recent weight loss, no change in appetite.   Eyes: No complaints of visual problems, no scleral icterus.   ENT: no complaints of ear pain, no hoarseness, no difficulty swallowing,  no tinnitus and no new masses in head, oral cavity, or neck.   Cardiovascular: No complaints of chest pain, no palpitations, no ankle edema.   Respiratory: No complaints of shortness of breath, no cough.   Gastrointestinal: No complaints of jaundice, no bloody stools, no pale stools.   Genitourinary: No complaints of dysuria, no hematuria, no nocturia, no frequent urination, no urethral discharge.   Musculoskeletal: No complaints of weakness, paralysis, joint stiffness or arthralgias.  Integumentary: No complaints of rash, no new lesions.   Neurological: No complaints of convulsions, no seizures, no dizziness.   Hematologic/Lymphatic: No complaints of easy bruising.   Endocrine:  No hot or cold intolerance.  No polydipsia, polyphagia, or polyuria.  Allergy/immunology:  No environmental allergies.  No food allergies.  Not immunocompromised.  Skin:  No pallor or rash.  No wound.        Patient Active Problem List   Diagnosis    Personal history of colon cancer    Encounter for follow-up surveillance of colon cancer     Past Medical History:   Diagnosis Date    Abnormal Pap smear of cervix     Sinus congestion     chronic    Varicella       Past Surgical History:   Procedure Laterality Date    BREAST EXCISIONAL BIOPSY Right     benign    FOOT NEUROMA SURGERY Left     FOOT SURGERY      HEMICOLOECTOMY W/ ANASTOMOSIS N/A 2023    Procedure: LAPAROSCOPIC SIGMOID COLECTOMY WITH ROBOTICS, FLEXIBLE PROCTOSCOPY;  Surgeon: Ileana Locke MD;  Location: [facility];  Service: Surgical Oncology    US BREAST NEEDLE LOC RIGHT Right 10/08/2008     Family History   Problem Relation Age of Onset    Diabetes Mother     No Known Problems Father     Breast cancer Maternal Grandmother         50-60's    Stroke Maternal Grandmother     Diabetes Maternal Grandmother     Cancer Maternal Grandfather     No Known Problems Paternal Grandmother     No Known Problems Brother     No Known Problems Half-Sister     Breast cancer Maternal Aunt         68-70    Endometrial cancer Maternal Aunt         maybe 40's    No Known Problems Maternal Aunt     Colon cancer Neg Hx     Ovarian cancer Neg Hx      Social History     Socioeconomic History    Marital status: /Civil Union     Spouse name: Not on file    Number of children: Not on file    Years of education: Not on file    Highest education level: Not on file   Occupational History    Not on file   Tobacco Use    Smoking status: Former     Current packs/day: 0.00     Types: Cigarettes     Quit date:      Years since quittin.0    Smokeless tobacco: Never   Vaping Use    Vaping status: Never Used   Substance and Sexual Activity    Alcohol use: Yes     Comment: social    Drug use: Never    Sexual activity: Yes     Partners: Male     Birth control/protection: I.U.D.     Comment: no new partner in past year   Other Topics Concern    Not on file   Social History Narrative    Not on file     Social Determinants of Health     Financial Resource Strain: Low Risk  (2023)    Received from [facility]    Overall Financial Resource Strain (CARDIA)     Difficulty of Paying Living  Expenses: Not hard at all   Food Insecurity: No Food Insecurity (11/6/2023)    Received from [facility]    Hunger Vital Sign     Worried About Running Out of Food in the Last Year: Never true     Ran Out of Food in the Last Year: Never true   Transportation Needs: No Transportation Needs (11/6/2023)    Received from [facility]    PRAPARE - Transportation     Lack of Transportation (Medical): No     Lack of Transportation (Non-Medical): No   Physical Activity: Sufficiently Active (11/6/2023)    Received from [facility]    Exercise Vital Sign     Days of Exercise per Week: 5 days     Minutes of Exercise per Session: 50 min   Stress: Stress Concern Present (11/6/2023)    Received from [facility]    [employer] - Occupational Stress Questionnaire     Feeling of Stress : To some extent   Social Connections: Not on file   Intimate Partner Violence: Not on file   Housing Stability: Low Risk  (11/6/2023)    Received from [facility]    Housing Stability Vital Sign     Unable to Pay for Housing in the Last Year: No     Number of Places Lived in the Last Year: 1     Unstable Housing in the Last Year: No       Current Outpatient Medications:     cholecalciferol (VITAMIN D3) 1,000 units tablet, Take 2,000 Units by mouth daily, Disp: , Rfl:     Levonorgestrel (MIRENA) 20 MCG/DAY IUD, 1 each by Intrauterine route once, Disp: , Rfl:     celecoxib (CeleBREX) 200 mg capsule, Take 1 capsule (200 mg total) by mouth daily for 7 days (Patient not taking: Reported on 7/6/2023), Disp: 7 capsule, Rfl: 0    enoxaparin (Lovenox) 40 mg/0.4 mL, Inject 0.4 mL (40 mg total) under the skin in the morning for 25 days (Patient not taking: Reported on 8/7/2023), Disp: 10 mL, Rfl: 0    gabapentin (NEURONTIN) 300 mg capsule, Take 1 capsule (300 mg total) by mouth 3  (three) times a day for 7 days (Patient not taking: Reported on 7/6/2023), Disp: 21 capsule, Rfl: 0    OLANZapine (ZyPREXA) 5 mg tablet, take 1 tablet by mouth at bedtime (Patient not taking: Reported on 12/13/2023), Disp: 30 tablet, Rfl: 0    prochlorperazine (COMPAZINE) 10 mg tablet, Take 1 tablet (10 mg total) by mouth every 8 (eight) hours as needed for nausea or vomiting (Patient not taking: Reported on 12/13/2023), Disp: 30 tablet, Rfl: 0  No Known Allergies  Vitals:    01/03/24 1500   BP: 150/80   Pulse: 70   Temp: 98.5 °F (36.9 °C)   SpO2: 98%       Physical Exam  Constitutional: Patient is well-developed and well-nourished who appears the stated age in no acute distress. Patient is pleasant and talkative.     HEENT:  Normocephalic.  Sclerae are anicteric. Mucous membranes are moist. Neck is supple without adenopathy. No JVD.     Chest: Equal chest rise, easy WOB  Cardiac: Heart is regular rate.     Abdomen: Abdomen is non-tender, non-distended and without masses.   Incisions healing well  Extremities: There is no clubbing or cyanosis. There is no edema.  Symmetric.  Neuro: Grossly nonfocal. Gait is normal.     Lymphatic: No evidence of cervical adenopathy bilaterally. No evidence of axillary adenopathy bilaterally. No evidence of inguinal adenopathy bilaterally.     Skin: Warm, anicteric.    Psych:  Patient is pleasant and talkative.    Pathology:  As above    Labs:  Reviewed in [facility] personally    Imaging  CT chest abdomen pelvis w contrast    Result Date: 6/12/2023  Narrative: CT CHEST, ABDOMEN AND PELVIS WITH IV CONTRAST INDICATION:   C18.9: Malignant neoplasm of colon, unspecified. COMPARISON:  None. TECHNIQUE: CT examination of the chest, abdomen and pelvis was performed. Multiplanar 2D reformatted images were created from the source data. This examination, like all CT scans performed in the [facility], was performed utilizing techniques to minimize radiation dose exposure, including  the use of iterative reconstruction and automated exposure control. Radiation dose length product (DLP) for this visit:  717.16 mGy-cm IV Contrast:  80 mL of iohexol (OMNIPAQUE) Enteric Contrast: Enteric contrast was not administered. FINDINGS: CHEST LUNGS: No worrisome pulmonary nodule. There is no tracheal or endobronchial lesion. PLEURA:  Unremarkable. HEART/GREAT VESSELS: Heart is unremarkable for patient's age.  No thoracic aortic aneurysm. MEDIASTINUM AND BRYAN:  Unremarkable. CHEST WALL AND LOWER NECK:  Unremarkable. ABDOMEN LIVER/BILIARY TREE:  Unremarkable. GALLBLADDER:  No calcified gallstones. No pericholecystic inflammatory change. SPLEEN:  Unremarkable. PANCREAS:  Unremarkable. ADRENAL GLANDS:  Unremarkable. KIDNEYS/URETERS: One or more sharply circumscribed subcentimeter renal hypodensities are noted. These lesions are too small to accurately characterize, but are statistically most likely to represent benign cortical renal cyst(s).  According to the guidelines published in the White Paper of the ACR Incidental Findings Committee (Radiology 2010), no further workup of these lesions is recommended. No hydronephrosis. STOMACH AND BOWEL:  Unremarkable. APPENDIX:  No findings to suggest appendicitis. ABDOMINOPELVIC CAVITY:  No ascites.  No pneumoperitoneum.  No lymphadenopathy. VESSELS:  Unremarkable for patient's age. PELVIS REPRODUCTIVE ORGANS: There is an intrauterine device in the uterus. URINARY BLADDER:  Unremarkable. ABDOMINAL WALL/INGUINAL REGIONS:  Unremarkable. OSSEOUS STRUCTURES:  No acute fracture or destructive osseous lesion.     Impression: No evidence for metastatic disease in the chest, abdomen, or pelvis. Workstation performed: [id number]       I reviewed the above laboratory and imaging data incl recent CT which is MARGARITA, recent med onc visit, path    Discussion/Summary:   51 yo female s/p robo RT gustavo 6/2023. T1N1 on path s/p 3 mo adjuvant therapy. Scans MARGARITA 12/2023.  Will see her 6 mo  with scan and one year scope. Discussed with Dr Hirsch

## 2024-02-19 ENCOUNTER — TELEPHONE (OUTPATIENT)
Dept: HEMATOLOGY ONCOLOGY | Facility: CLINIC | Age: 54
End: 2024-02-19

## 2024-03-11 ENCOUNTER — TELEPHONE (OUTPATIENT)
Age: 54
End: 2024-03-11

## 2024-03-11 ENCOUNTER — PREP FOR PROCEDURE (OUTPATIENT)
Age: 54
End: 2024-03-11

## 2024-03-11 ENCOUNTER — PATIENT MESSAGE (OUTPATIENT)
Age: 54
End: 2024-03-11

## 2024-03-11 DIAGNOSIS — Z85.038 ENCOUNTER FOR FOLLOW-UP SURVEILLANCE OF COLON CANCER: ICD-10-CM

## 2024-03-11 DIAGNOSIS — Z85.038 PERSONAL HISTORY OF COLON CANCER: Primary | ICD-10-CM

## 2024-03-11 DIAGNOSIS — C18.9 ADENOCARCINOMA OF COLON (HCC): Primary | ICD-10-CM

## 2024-03-11 DIAGNOSIS — Z08 ENCOUNTER FOR FOLLOW-UP SURVEILLANCE OF COLON CANCER: ICD-10-CM

## 2024-03-11 NOTE — TELEPHONE ENCOUNTER
Scheduled date of colonoscopy (as of today): 05/30/2024  Physician performing colonoscopy: DR COOK  Location of colonoscopy: [facility]  Bowel prep reviewed with patient: MIRALAX/DULCOLAX  Instructions reviewed with patient by: Debby via telephone. Procedure directions sent via [email or web address].  Clearances: n/a

## 2024-03-11 NOTE — TELEPHONE ENCOUNTER
03/11/24  Screened by: Debby Correa MA    Referring Provider 1 YR REPEAT    Pre- Screening:     There is no height or weight on file to calculate BMI.  Has patient been referred for a routine screening Colonoscopy? yes  Is the patient between 45-75 years old? yes      Previous Colonoscopy yes   If yes:    Date: 05/23/2023    Facility: [facility]    Reason: SCREENING          Does the patient want to see a Gastroenterologist prior to their procedure OR are they having any GI symptoms? no    Has the patient been hospitalized or had abdominal surgery in the past 6 months? no    Does the patient use supplemental oxygen? no    Does the patient take Coumadin, Lovenox, Plavix, Elliquis, Xarelto, or other blood thinning medication? no    Has the patient had a stroke, cardiac event, or stent placed in the past year? no        If patient is between 45yrs - 49yrs, please advise patient that we will have to confirm benefits & coverage with their insurance company for a routine screening colonoscopy.

## 2024-05-16 ENCOUNTER — ANESTHESIA (OUTPATIENT)
Dept: ANESTHESIOLOGY | Facility: AMBULATORY SURGERY CENTER | Age: 54
End: 2024-05-16

## 2024-05-16 ENCOUNTER — ANESTHESIA EVENT (OUTPATIENT)
Dept: ANESTHESIOLOGY | Facility: AMBULATORY SURGERY CENTER | Age: 54
End: 2024-05-16

## 2024-05-30 ENCOUNTER — ANESTHESIA (OUTPATIENT)
Dept: GASTROENTEROLOGY | Facility: AMBULATORY SURGERY CENTER | Age: 54
End: 2024-05-30

## 2024-05-30 ENCOUNTER — ANESTHESIA EVENT (OUTPATIENT)
Dept: GASTROENTEROLOGY | Facility: AMBULATORY SURGERY CENTER | Age: 54
End: 2024-05-30

## 2024-05-30 ENCOUNTER — HOSPITAL ENCOUNTER (OUTPATIENT)
Dept: GASTROENTEROLOGY | Facility: AMBULATORY SURGERY CENTER | Age: 54
Discharge: HOME | End: 2024-05-30
Payer: COMMERCIAL

## 2024-05-30 VITALS
WEIGHT: 160 LBS | HEIGHT: 67 IN | OXYGEN SATURATION: 100 % | TEMPERATURE: 98.6 F | BODY MASS INDEX: 25.11 KG/M2 | DIASTOLIC BLOOD PRESSURE: 87 MMHG | SYSTOLIC BLOOD PRESSURE: 149 MMHG | RESPIRATION RATE: 16 BRPM | HEART RATE: 61 BPM

## 2024-05-30 DIAGNOSIS — C18.9 ADENOCARCINOMA OF COLON (HCC): ICD-10-CM

## 2024-05-30 PROCEDURE — 45378 DIAGNOSTIC COLONOSCOPY: CPT | Performed by: INTERNAL MEDICINE

## 2024-05-30 RX ORDER — LIDOCAINE HYDROCHLORIDE 10 MG/ML
INJECTION, SOLUTION EPIDURAL; INFILTRATION; INTRACAUDAL; PERINEURAL AS NEEDED
Status: DISCONTINUED | OUTPATIENT
Start: 2024-05-30 | End: 2024-05-30

## 2024-05-30 RX ORDER — PROPOFOL 10 MG/ML
INJECTION, EMULSION INTRAVENOUS AS NEEDED
Status: DISCONTINUED | OUTPATIENT
Start: 2024-05-30 | End: 2024-05-30

## 2024-05-30 RX ORDER — SODIUM CHLORIDE, SODIUM LACTATE, POTASSIUM CHLORIDE, CALCIUM CHLORIDE 600; 310; 30; 20 MG/100ML; MG/100ML; MG/100ML; MG/100ML
50 INJECTION, SOLUTION INTRAVENOUS CONTINUOUS
Status: DISCONTINUED | OUTPATIENT
Start: 2024-05-30 | End: 2024-06-03 | Stop reason: HOSPADM

## 2024-05-30 RX ADMIN — PROPOFOL 30 MG: 10 INJECTION, EMULSION INTRAVENOUS at 08:39

## 2024-05-30 RX ADMIN — PROPOFOL 20 MG: 10 INJECTION, EMULSION INTRAVENOUS at 08:44

## 2024-05-30 RX ADMIN — PROPOFOL 100 MG: 10 INJECTION, EMULSION INTRAVENOUS at 08:33

## 2024-05-30 RX ADMIN — PROPOFOL 50 MG: 10 INJECTION, EMULSION INTRAVENOUS at 08:34

## 2024-05-30 RX ADMIN — LIDOCAINE HYDROCHLORIDE 50 MG: 10 INJECTION, SOLUTION EPIDURAL; INFILTRATION; INTRACAUDAL; PERINEURAL at 08:33

## 2024-05-30 RX ADMIN — PROPOFOL 20 MG: 10 INJECTION, EMULSION INTRAVENOUS at 08:37

## 2024-05-30 RX ADMIN — SODIUM CHLORIDE, SODIUM LACTATE, POTASSIUM CHLORIDE, CALCIUM CHLORIDE 50 ML/HR: 600; 310; 30; 20 INJECTION, SOLUTION INTRAVENOUS at 08:25

## 2024-05-30 NOTE — ANESTHESIA PREPROCEDURE EVALUATION
Procedure:  COLONOSCOPY    Relevant Problems   ANESTHESIA (within normal limits)      Oncology   (+) Personal history of colon cancer        Physical Exam    Airway    Mallampati score: II  TM Distance: >3 FB  Neck ROM: full     Dental   No notable dental hx     Cardiovascular  Cardiovascular exam normal    Pulmonary  Pulmonary exam normal     Other Findings  post-pubertal.      Anesthesia Plan  ASA Score- 2     Anesthesia Type- IV sedation with anesthesia with ASA Monitors.         Additional Monitors:     Airway Plan:     Comment: I discussed risks (reviewed with patient on the anesthesia consent form), benefits and alternatives of monitored sedation including the possibility under sedation to have recall or mild discomfort.  .       Plan Factors-    Chart reviewed.    Patient summary reviewed.                  Induction- intravenous.    Postoperative Plan-         Informed Consent- Anesthetic plan and risks discussed with patient.  I personally reviewed this patient with the CRNA. Discussed and agreed on the Anesthesia Plan with the CRNA..

## 2024-05-30 NOTE — H&P
History and Physical - [facility]  Irma Gee 53 y.o. female MRN: [id number]    HPI: Irma Gee is a 53 y.o. year old female who presents for colon cancer    REVIEW OF SYSTEMS: Per the HPI, and otherwise unremarkable.    Historical Information   Past Medical History:   Diagnosis Date    Abnormal Pap smear of cervix     Colon cancer (HCC)     Sinus congestion     chronic    Varicella      Past Surgical History:   Procedure Laterality Date    BREAST EXCISIONAL BIOPSY Right 2008    benign    FOOT NEUROMA SURGERY Left     FOOT SURGERY      HEMICOLOECTOMY W/ ANASTOMOSIS N/A 2023    Procedure: LAPAROSCOPIC SIGMOID COLECTOMY WITH ROBOTICS, FLEXIBLE PROCTOSCOPY;  Surgeon: Ileana Locke MD;  Location: [facility];  Service: Surgical Oncology    US BREAST NEEDLE LOC RIGHT Right 10/08/2008     Social History   Social History     Substance and Sexual Activity   Alcohol Use Yes    Comment: social     Social History     Substance and Sexual Activity   Drug Use Never     Social History     Tobacco Use   Smoking Status Former    Current packs/day: 0.00    Types: Cigarettes    Quit date:     Years since quittin.4   Smokeless Tobacco Never     Family History   Problem Relation Age of Onset    Diabetes Mother     No Known Problems Father     Breast cancer Maternal Grandmother         50-60's    Stroke Maternal Grandmother     Diabetes Maternal Grandmother     Cancer Maternal Grandfather     No Known Problems Paternal Grandmother     No Known Problems Brother     No Known Problems Half-Sister     Breast cancer Maternal Aunt         68-70    Endometrial cancer Maternal Aunt         maybe 40's    No Known Problems Maternal Aunt     Colon cancer Neg Hx     Ovarian cancer Neg Hx        Meds/Allergies       Current Outpatient Medications:     cholecalciferol (VITAMIN D3) 1,000 units tablet    celecoxib (CeleBREX) 200 mg capsule    enoxaparin (Lovenox) 40 mg/0.4 mL    gabapentin (NEURONTIN)  "300 mg capsule    Levonorgestrel (MIRENA) 20 MCG/DAY IUD    OLANZapine (ZyPREXA) 5 mg tablet    prochlorperazine (COMPAZINE) 10 mg tablet    Current Facility-Administered Medications:     lactated ringers infusion, 50 mL/hr, Intravenous, Continuous, 50 mL/hr at 05/30/24 0825    No Known Allergies    Objective     BP (!) 174/84   Pulse 67   Temp 98.6 °F (37 °C) (Temporal)   Resp 20   Ht 5' 7\" (1.702 m)   Wt 72.6 kg (160 lb)   SpO2 98%   BMI 25.06 kg/m²     PHYSICAL EXAM    Gen: NAD AAOx3  Head: Normocephalic, Atraumatic  CV: S1S2 RRR no m/r/g  CHEST: Clear b/l no c/r/w  ABD: soft, +BS NT/ND  EXT: no edema    ASSESSMENT/PLAN:  This is a 53 y.o. year old female here for colonoscopy, and she is stable and optimized for her procedure.        "

## 2024-05-30 NOTE — ANESTHESIA POSTPROCEDURE EVALUATION
Post-Op Assessment Note    CV Status:  Stable  Pain Score: 0    Pain management: adequate       Mental Status:  Alert and awake   Hydration Status:  Euvolemic   PONV Controlled:  Controlled   Airway Patency:  Patent     Post Op Vitals Reviewed: Yes    No anethesia notable event occurred.    Staff: CRNA               BP   124/74   Temp 97.4   Pulse 60   Resp 12   SpO2 97

## 2024-05-31 ENCOUNTER — APPOINTMENT (OUTPATIENT)
Dept: LAB | Facility: HOSPITAL | Age: 54
End: 2024-05-31
Payer: COMMERCIAL

## 2024-05-31 DIAGNOSIS — C18.9 COLON ADENOCARCINOMA (HCC): ICD-10-CM

## 2024-05-31 LAB
ALBUMIN SERPL BCP-MCNC: 4.2 G/DL (ref 3.5–5)
ALP SERPL-CCNC: 77 U/L (ref 34–104)
ALT SERPL W P-5'-P-CCNC: 16 U/L (ref 7–52)
ANION GAP SERPL CALCULATED.3IONS-SCNC: 13 MMOL/L (ref 4–13)
AST SERPL W P-5'-P-CCNC: 19 U/L (ref 13–39)
BILIRUB SERPL-MCNC: 0.59 MG/DL (ref 0.2–1)
BUN SERPL-MCNC: 20 MG/DL (ref 5–25)
CALCIUM SERPL-MCNC: 9.3 MG/DL (ref 8.4–10.2)
CEA SERPL-MCNC: 0.8 NG/ML (ref 0–3)
CHLORIDE SERPL-SCNC: 102 MMOL/L (ref 96–108)
CO2 SERPL-SCNC: 26 MMOL/L (ref 21–32)
CREAT SERPL-MCNC: 0.98 MG/DL (ref 0.6–1.3)
GFR SERPL CREATININE-BSD FRML MDRD: 65 ML/MIN/1.73SQ M
GLUCOSE P FAST SERPL-MCNC: 83 MG/DL (ref 65–99)
POTASSIUM SERPL-SCNC: 4.2 MMOL/L (ref 3.5–5.3)
PROT SERPL-MCNC: 7.2 G/DL (ref 6.4–8.4)
SODIUM SERPL-SCNC: 141 MMOL/L (ref 135–147)

## 2024-05-31 PROCEDURE — 82378 CARCINOEMBRYONIC ANTIGEN: CPT

## 2024-05-31 PROCEDURE — 80053 COMPREHEN METABOLIC PANEL: CPT

## 2024-05-31 PROCEDURE — 36415 COLL VENOUS BLD VENIPUNCTURE: CPT

## 2024-06-03 ENCOUNTER — HOSPITAL ENCOUNTER (OUTPATIENT)
Dept: CT IMAGING | Facility: HOSPITAL | Age: 54
Discharge: HOME | End: 2024-06-03
Attending: INTERNAL MEDICINE
Payer: COMMERCIAL

## 2024-06-03 DIAGNOSIS — C18.9 COLON ADENOCARCINOMA (HCC): ICD-10-CM

## 2024-06-03 PROCEDURE — 74177 CT ABD & PELVIS W/CONTRAST: CPT

## 2024-06-03 PROCEDURE — 71260 CT THORAX DX C+: CPT

## 2024-06-03 PROCEDURE — G1004 CDSM NDSC: HCPCS

## 2024-06-03 RX ADMIN — IOHEXOL 100 ML: 350 INJECTION, SOLUTION INTRAVENOUS at 07:44

## 2024-06-07 ENCOUNTER — TELEPHONE (OUTPATIENT)
Age: 54
End: 2024-06-07

## 2024-06-07 NOTE — TELEPHONE ENCOUNTER
I attempted to contact Olesya, however there was no answer.  She was last seen 6/15/17 and was to follow up prn.  I will update provider.   Patient called for a skin check new patient apt.  Offered first available/declined    Oral Minoxidil Counseling- I discussed with the patient the risks of oral minoxidil including but not limited to shortness of breath, swelling of the feet or ankles, dizziness, lightheadedness, unwanted hair growth and allergic reaction.  The patient verbalized understanding of the proper use and possible adverse effects of oral minoxidil.  All of the patient's questions and concerns were addressed.

## 2024-06-10 ENCOUNTER — TELEPHONE (OUTPATIENT)
Dept: HEMATOLOGY ONCOLOGY | Facility: CLINIC | Age: 54
End: 2024-06-10

## 2024-06-22 NOTE — PROGRESS NOTES
Hematology/Oncology Outpatient Office Note    Date of Service: 2024    [facility]  [address]  [place] [address]  [phone]    Reason for Consultation:   Chief Complaint   Patient presents with    Follow-up       Cancer Pathologic Stage at diagnosis: IIIA    Referral Physician: No ref. provider found    Primary Care Physician:  Kris Mares DO     Nickname: Irma    Spouse: Song    No kids    Original ECO     Today's ECO    Goals and Barriers:  Current Goal: Minimize effects of disease burden, extend life.   Barriers to accomplishing this: None    Patient's Capacity to Self Care:  Patient is able to self care      ASSESSMENT & PLAN      Diagnosis ICD-10-CM Associated Orders   1. Personal history of colon cancer  Z85.038             This is a 53 y.o. c PMHx notable for sinus congestion, being seen in consultation for early stage colon adenocarcinoma on surveillance      Discussion of decision making  Oncology history updated, accordingly, during this visit  Goals of care/patient communication  I discussed with the patient the clinical course leading up to their cancer diagnosis. I reviewed relevant office notes, imaging reports and pathology result as well.  I told the patient that this is a case of curable disease and what this means. We discussed that the goal of anti-cancer therapy is to provide best quality of life, extend overall survival, and progression free survival as shown in clinical trials. We also discussed that there might be a point when the cancer will no longer respond to this anti-neoplastic therapy.   I explained the risks/benefits of the proposed cancer therapy: CAPOX for 3 months and after discussion including understanding risks of possible life-threatening complications and therapy-related malignancy development, informed consent for blood products and treatment has been signed and obtained.  TNM/Staging At  Diagnosis  Cancer Staging  Colon adenocarcinoma (HCC)  Staging form: Colon and Rectum, AJCC 8th Edition  - Pathologic: Stage IIIA (pT1, pN1, cM0) - Signed by Ileana Locke MD on 7/6/2023  Disease Features/Tumor Markers/Genetics  Tumor Marker: CEA   7/21/2023 0.8  12/4/2023: 1.4  1/5/2024: signatera negative  3/31/2024: signatera negative  5/31/2024: CEA 0.8  Notable Path Features: MMR-proficient, 1/19 LN involved  Treatment: CAPOX  Other Supportive care:   Treatment Team Members  Surgeon: Dr. Locke  Rad Onc  Palliative  Labs: 7/21/2023: Hgb 13.3, WBC 5k, plt 240k  Diagnostics  6/11/2023 CT CAP w/c: No evidence for metastatic disease in the chest, abdomen, or pelvis  7/28/2023: signatera negative  9/1/2023 signatera negative  11/6/2023 signatera: negative  12/11/2023 CT CAP w/c: No evidence of metastatic disease in the chest, abdomen, or pelvis.  6/3/2024 CT CAP w/c: No new findings to suggest metastatic disease in the chest, abdomen, or pelvis     This is a patient with Stage III L sided colon cancer with only one high risk features (LN). The patient is MMR-proficient.     From an overall performance status basis, I believe the patient can tolerate systemic treatment.    Prognosis is more hopeful as we are dealing with an originating left sided.     Thoughts on approach to systemic therapy in the first line and subsequent lines of therapy:    If no adjuvant therapy or if Xeloda/5-FU done:   The next stage in the evolution of adjuvant therapy involved the evaluation of 5-FU with leucovorin in several key trials. The NSABP C-03 study72 reported a 3-year disease-free survival (DFS) rate of 73% for patients receiving 5-FU/leucovorin, compared with a rate of 64% for those who received a combination of the alkylating nitrosourea lomustine, the alkaloid vincristine, and 5-FU (MOF; P = .0004). The IMPACT (International Multicenter Pooled Analysis of Colorectal Cancer Trials)73 pooled data from 3 randomized trials that  investigated high-dose 5-FU/leucovorin compared with no adjuvant therapy. 5-FU/leucovorin reduced mortality by 22% (P = .029) and CRC events by 35% (P < .0001) compared with no adjuvant therapy    In the X-ACT (Xeloda in Adjuvant Colon Cancer Therapy) trial,78 patients were randomized to capecitabine or the [facility] regimen. There were no statistically significant differences between the 2 arms in terms of DFS (64.2% vs. 60.6%, respectively; P = NS) or OS (81.3% and 77.6%; P = .05). However, capecitabine was associated with significantly fewer adverse events than the [facility] regimen (P < .001). The NSAB C-06 study,79 which compared tegafur with leucovorin versus the [place] regimen, reported that 5-year DFS (68.2% vs. 67.0%, respectively; P = NS) and OS (78.7% vs. 78.5%; P = NS) were similar for the 2 treatments.        IDEA trial  We went over the IDEA trial which showed that 3 months of CAPOX was non-inferior to 6 months of mFOLFOX6 in all but high risk Stage 3 colon cancer. The absolute 0.4% difference in 5-year overall survival was noted.      In high risk stage III colon cancers like her (T4), there would be nearly 1 more person out of a 100 alive at 5 years than otherwise would have . Grade ? 2 neurotoxicity during active therapy and in the first month after stopping study treatment occurred in 16.0% of patients in the 3-month group vs 44.5% of those in the 6-month group (CAPOX vs mFOLFOX6).      Based on  ACCENT/IDEA pooled analysis of 11 trials, Raina et al suggested that early oxaliplatin discontinuation after 50% of the intended treatment was given (usually reason of Grade 1-2 neuropathy) did not impact 3 year DFS or 5 year OS (note that these numbers were negatively affected when <50% of intended oxaliplatin was given). 2.5% of pts had grade 3/4 neuropathy after 3 months of chemotherapy versus 15.9% who had 6 months. As a result, it is reasonable to discontinue oxaliplatin after 3  months for most patients      Survival Rates    In [facility], patients with stage II or III disease were randomly assigned to receive adjuvant FOLFOX4 or 5-FU/leucovorin. After a median follow-up of 9.5 years, 10-year overall survival among all 2,246 patients was 71.7% in the FOLFOX4 group vs 67.1% in the 5-FU/leucovorin group (hazard ratio [HR] = 0.85, P = .043), 78.4% vs 79.5% in those with stage II disease (HR = 1.00, P = .980), and 67.1% vs 59.0% in those with stage III disease (HR = 0.80, P = .016).    PO option: Xeloda 6511-8196 mg/m2 BID for 12 weeks (14 days on and 7 days off). Can start at 500 mg/m2 initially to work-up along with Oxaliplatin 135 mg/m2 q3 weeks        Discussion of disease response monitoring:  We discussed with the patient at length the role of imaging and potential blood monitoring of burden of disease. In addition, we discussed at length the role of increasingly recognized peripheral blood monitoring of disease burden.  Cell-free Circulating Tumor DNA Variant Allele Frequency has been associated with survival in metastatic cancer, albeit mainly determined in retrospective studies to date (https://pubmed.ncbi.nlm.nih.gov/52282137/). The patient had all of their questions and concerns answered and we will elect to pursue this.      Restaging CT Chest/abdomen/pelvis (CAP) scans will be planned q6 months for 5 years. H&P q3-6 months in the first 2 years and then q6 months thereafter until year 5. CEA q6 months. C-scope 12 months after surgery (3-6 months if there was a pre-obstructing lesion prior to surgery not allowing c-scope to be done)      Discussion of decision making    I personally reviewed the following lab results, the image studies, pathology, other specialty/physicians consult notes and recommendations, and outside medical records. I had a lengthy discussion with the patient and shared the work-up findings. We discussed the diagnosis and management plan as below. I spent 32 minutes  reviewing the records (labs, clinician notes, outside records, medical history, ordering medicine/tests/procedures, interpreting the imaging/labs previously done) and coordination of care as well as direct time with the patient today, of which greater than 50% of the time was spent in counseling and coordination of care with the patient/family.    Plan/Labs  Restaging CT CAP ordered in 6 months along with preceding CEA, CMP  Signatera surveillance prn  F/u Surg-Onc for post surgery care, due for 3 year C-scope 5/2027        Follow Up: 6 months    All questions were answered to the patient's satisfaction during this encounter. The patient knows the contact information for our office and knows to reach out for any relevant concerns related to this encounter. They are to call for any temperature 100.4 or higher, new symptoms including but not restricted to shaking chills, decreased appetite, nausea, vomiting, diarrhea, increased fatigue, shortness of breath or chest pain, confusion, and not feeling the strength to come to the clinic. For all other listed problems and medical diagnosis in their chart - they are managed by PCP and/or other specialists, which the patient acknowledges. Thank you very much for your consultation and making us a part of this patient's care. We are continuing to follow closely with you. Please do not hesitate to reach out to me with any additional questions or concerns.    Hortencia Montague MD  Hematology & Medical Oncology Staff Physician             Disclaimer: This document was prepared using [employer] technology. If a word or phrase is confusing, or does not make sense, this is likely due to recognition error which was not discovered during this clinician's review. If you believe an error has occurred, please contact me through [facility] service line for elias?cation.      ONCOLOGY HISTORY OF PRESENT ILLNESS        Oncology History   Personal history of colon cancer   5/23/2023  Biopsy    A. Transverse colon polyp cold snare, polypectomy:  -   Fragments of sessile serrated adenoma (deeper levels examined).     -   Separate scant fragments of tubular adenoma, likely tissue contaminant.       B. Sigmoid colon polyp hot snare, polypectomy:  -   Invasive adenocarcinoma, moderately differentiated, arising in fragments of traditional serrated adenoma.  -   MMR (MLH1, MSH2, MSH6 and PMS2) studies by immunohistochemistry on B2 are pending.  -   See comment and synoptic report.     6/8/2023 Initial Diagnosis    Colon adenocarcinoma (HCC)     6/23/2023 Surgery    A. Sigmoid colon, sigmoidectomy:  -   Sigmoid colon with tattoo pigment and multinucleated giant cell reaction.  -   Negative for residual adenoma and carcinoma.   -   Resection margins are negative for adenoma and carcinoma.   -   Metastatic adenocarcinoma involving one of 19 lymph nodes (1/19, pN1a).     7/6/2023 -  Cancer Staged    Staging form: Colon and Rectum, AJCC 8th Edition  - Pathologic: Stage IIIA (pT1, pN1, cM0) - Signed by Ileana Locke MD on 7/6/2023 7/31/2023 - 8/21/2023 Chemotherapy    alteplase (CATHFLO), 2 mg, Intracatheter, Every 1 Minute as needed, 2 of 4 cycles  oxaliplatin (ELOXATIN) chemo infusion, 245.7 mg (135 % of original dose 100 mg/m2), Intravenous, Once, 2 of 4 cycles  Dose modification: 135 mg/m2 (original dose 100 mg/m2, Cycle 1, Reason: Dose modified as per discussion with consulting physician)  Administration: 250 mg (7/31/2023), 250 mg (8/21/2023)       Status post robotic sigmoidectomy [6/26/2023-Dr. Locke].  Mismatch repair protein intact. Her cancer was identified by routine colonoscopy [3/24/23].  Results of the colonoscopy with biopsy of one of the 2 polyps (sigmoid) that were removed showed invasive adenocarcinoma, moderately differentiated, arising in fragments of traditional serrated adenoma.  Patient underwent subsequent sigmoid colectomy which showed metastatic adenocarcinoma involving  one of 19 lymph nodes (1/19, pN1a).   Patient denies any complications.  Denies any blood in stool or urine    CAPOX (Capecitabine 1000 mg/m2) was given q3 weeks for 2 cycles, and then discontinued oxaliplatin as she had 2 negative Signatera ct DNA and she decided to not pursue any further Oxaliplatin as a result due to neuropathy concerns    SUBJECTIVE  (INTERVAL HISTORY)      Clotting History None   Bleeding History None   Cancer History Colon   Family Cancer History pGrandfather (colon), mGrandmother (breast), mAunt (breast), mAunt (uterine)   H/O Blood/Plt Transfusion None   Tobacco/etoh/drug abuse 15-pack-year smoking history [quit 2012]    Hx COVID19 Infection and Vaccine Status January 2022 COVID, vaccinated    Cancer Screening history up-to-date,  Next colonoscopy due 6/2024   Occupation retired, was working for [employer]     9/11/2023: C3 Xeloda  Week of 10/2/2023: C4 Xeloda      Doing very well, enjoyed [place].    Doing her occasional jogging and doing lifting.     I have reviewed the relevant past medical, surgical, social and family history. I have also reviewed allergies and medications for this patient.    Review of Systems    Baseline weight: 155 lbs    Denies F/C, N/V, SOB, CP, LH, HA, rash, itching, gen weakness, melena, hematuria, hematochezia, falls, diarrhea, or constipation       A 10-point review of system was performed, pertinent positive and negative were detailed as above. Otherwise, the 10-point review of system was negative.      Past Medical History:   Diagnosis Date    Abnormal Pap smear of cervix     Colon cancer (HCC) 2023    Sinus congestion     chronic    Varicella        Past Surgical History:   Procedure Laterality Date    BREAST EXCISIONAL BIOPSY Right 2008    benign    FOOT NEUROMA SURGERY Left     FOOT SURGERY      HEMICOLOECTOMY W/ ANASTOMOSIS N/A 6/23/2023    Procedure: LAPAROSCOPIC SIGMOID COLECTOMY WITH ROBOTICS, FLEXIBLE PROCTOSCOPY;  Surgeon: Ileana Locke MD;   Location: [facility];  Service: Surgical Oncology    US BREAST NEEDLE LOC RIGHT Right 10/08/2008       Family History   Problem Relation Age of Onset    Diabetes Mother     No Known Problems Father     Breast cancer Maternal Grandmother         50-60's    Stroke Maternal Grandmother     Diabetes Maternal Grandmother     Cancer Maternal Grandfather     No Known Problems Paternal Grandmother     No Known Problems Brother     No Known Problems Half-Sister     Breast cancer Maternal Aunt         68-70    Endometrial cancer Maternal Aunt         maybe 40's    No Known Problems Maternal Aunt     Colon cancer Neg Hx     Ovarian cancer Neg Hx        Social History     Socioeconomic History    Marital status: /Civil Union     Spouse name: Not on file    Number of children: Not on file    Years of education: Not on file    Highest education level: Not on file   Occupational History    Not on file   Tobacco Use    Smoking status: Former     Current packs/day: 0.00     Types: Cigarettes     Quit date:      Years since quittin.4    Smokeless tobacco: Never   Vaping Use    Vaping status: Never Used   Substance and Sexual Activity    Alcohol use: Yes     Comment: social    Drug use: Never    Sexual activity: Yes     Partners: Male     Birth control/protection: I.U.D.     Comment: no new partner in past year   Other Topics Concern    Not on file   Social History Narrative    Not on file     Social Determinants of Health     Financial Resource Strain: Low Risk  (2023)    [facility]    Overall Financial Resource Strain (CARDIA)     Difficulty of Paying Living Expenses: Not hard at all   Food Insecurity: No Food Insecurity (2023)    [facility]    Hunger Vital Sign     Worried About Running Out of Food in the Last Year: Never true      Ran Out of Food in the Last Year: Never true   Transportation Needs: No Transportation Needs (11/6/2023)    [facility]    PRAPARE - Transportation     Lack of Transportation (Medical): No     Lack of Transportation (Non-Medical): No   Physical Activity: Sufficiently Active (11/6/2023)    [facility]    Exercise Vital Sign     Days of Exercise per Week: 5 days     Minutes of Exercise per Session: 50 min   Stress: Stress Concern Present (11/6/2023)    [facility] from [facility]    [employer] [place] [employer] - Occupational Stress Questionnaire     Feeling of Stress : To some extent   Social Connections: Not on file   Intimate Partner Violence: Not on file   Housing Stability: Low Risk  (11/6/2023)    [facility]    Housing Stability Vital Sign     Unable to Pay for Housing in the Last Year: No     Number of Places Lived in the Last Year: 1     In the last 12 months, was there a time when you did not have a steady place to sleep or slept in a shelter (including now)?: No       No Known Allergies    Current Outpatient Medications   Medication Sig Dispense Refill    cholecalciferol (VITAMIN D3) 1,000 units tablet Take 2,000 Units by mouth daily      celecoxib (CeleBREX) 200 mg capsule Take 1 capsule (200 mg total) by mouth daily for 7 days (Patient not taking: Reported on 7/6/2023) 7 capsule 0    enoxaparin (Lovenox) 40 mg/0.4 mL Inject 0.4 mL (40 mg total) under the skin in the morning for 25 days (Patient not taking: Reported on 8/7/2023) 10 mL 0    gabapentin (NEURONTIN) 300 mg capsule Take 1 capsule (300 mg total) by mouth 3 (three) times a day for 7 days (Patient not taking:  Reported on 7/6/2023) 21 capsule 0    Levonorgestrel (MIRENA) 20 MCG/DAY IUD 1 each by Intrauterine route once      OLANZapine (ZyPREXA) 5 mg tablet take 1 tablet by mouth at bedtime (Patient not taking: Reported on 12/13/2023) 30 tablet 0    prochlorperazine (COMPAZINE) 10 mg tablet Take 1 tablet (10 mg total) by mouth every 8 (eight) hours as needed for nausea or vomiting (Patient not taking: Reported on 12/13/2023) 30 tablet 0     No current facility-administered medications for this visit.       (Not in a hospital admission)      Objective:     24 Hour Vitals Assessment:     Vitals:    06/25/24 0903   BP: 116/68   Pulse: 65   Resp: 18   Temp: (!) 97.4 °F (36.3 °C)   SpO2: 97%       Weight at last visit: 160 lbs    Weight today: 157 lbs    PHYSICIAN EXAM:    General: Appearance: alert, cooperative, no distress.  HEENT: Normocephalic, atraumatic. No scleral icterus. conjunctivae clear. EOMI.  Chest: No tenderness to palpation. No open wound noted.  Lungs: Clear to auscultation bilaterally, Respirations unlabored.  Cardiac: Regular rate, +S1and S2  Abdomen: Soft, non-tender, non-distended. Bowel sounds are normal.   Extremities:  No edema, cyanosis, clubbing.  Skin: Skin color, turgor are normal. No rashes.  Lymphatics: no palpable supra-cervical, axillary, or inguinal adenopathy  Neurologic: Awake, Alert, and oriented, no gross focal deficits noted b/l.       DATA REVIEW:    Pathology Result:    Final Diagnosis   Date Value Ref Range Status   06/23/2023   Final    A. Sigmoid colon, sigmoidectomy:  -   Sigmoid colon with tattoo pigment and multinucleated giant cell reaction.  -   Negative for residual adenoma and carcinoma.   -   Resection margins are negative for adenoma and carcinoma.   -   Metastatic adenocarcinoma involving one of 19 lymph nodes (1/19, pN1a).    Interpretation performed at [facility], [facility] [address] [place] [id number]      05/23/2023   Final    A. Transverse colon polyp cold snare,  polypectomy:  -   Fragments of sessile serrated adenoma (deeper levels examined).     -   Separate scant fragments of tubular adenoma, likely tissue contaminant.      B. Sigmoid colon polyp hot snare, polypectomy:  -   Invasive adenocarcinoma, moderately differentiated, arising in fragments of traditional serrated adenoma.  -   MMR (MLH1, MSH2, MSH6 and PMS2) studies by immunohistochemistry on B2 are pending.  -   See comment and synoptic report.    Comment:   Part B) CD31/CKC immunostain on B3 is negative for lymphovascular invasion.    Select slide B2 is reviewed by Dr. Pilar Ramos who concurs with the diagnosis.    Diagnosis is communicated via [employer] to Dr. Reese Rose on 6/1/2023 at 0932.      Interpretation performed at [facility] [address] [place] [id number]           Image Results:   Image result are reviewed and documented in Hematology/Oncology history    CT chest abdomen pelvis w contrast  Narrative: CT CHEST, ABDOMEN AND PELVIS WITH IV CONTRAST    INDICATION: C18.9: Malignant neoplasm of colon, unspecified.    COMPARISON: CT chest/abdomen/pelvis dated 12/11/2023.    TECHNIQUE: CT examination of the chest, abdomen and pelvis was performed. Multiplanar 2D reformatted images were created from the source data.    This examination, like all CT scans performed in the On license of [facility], was performed utilizing techniques to minimize radiation dose exposure, including the use of iterative reconstruction and automated exposure control. Radiation dose length   product (DLP) for this visit: 654.83 mGy-cm    IV Contrast: 100 mL of iohexol (OMNIPAQUE)  Enteric Contrast: Administered.    FINDINGS:    CHEST    LUNGS: No worrisome pulmonary mass. No focal airspace consolidation. No tracheal or endobronchial lesion.    PLEURA: Unremarkable.    HEART/GREAT VESSELS: Heart is unremarkable for patient's age. No thoracic aortic aneurysm.    MEDIASTINUM AND BRYAN: Unremarkable.    CHEST WALL  "AND LOWER NECK: Unremarkable.    ABDOMEN    LIVER/BILIARY TREE: Unremarkable.    GALLBLADDER: No calcified gallstones. No pericholecystic inflammatory change.    SPLEEN: Unremarkable.    PANCREAS: Unremarkable.    ADRENAL GLANDS: Unremarkable.    KIDNEYS/URETERS: 1.2 cm left renal cyst. No hydronephrosis.    STOMACH AND BOWEL: No abnormal bowel wall thickening of the opacified bowel loops. No evidence for bowel obstruction. Stable appearance of the sigmoid colon anastomosis. Large fecal residual throughout the colon and rectum consistent with constipation.    APPENDIX: No findings to suggest appendicitis.    ABDOMINOPELVIC CAVITY: No ascites. No pneumoperitoneum. No lymphadenopathy.    VESSELS: Unremarkable for patient's age.    PELVIS    REPRODUCTIVE ORGANS: Intrauterine device positioned within the uterus.    URINARY BLADDER: Unremarkable.    ABDOMINAL WALL/INGUINAL REGIONS: Unremarkable.    BONES: No acute fracture or suspicious osseous lesion.  Impression: No new findings to suggest metastatic disease in the chest, abdomen, or pelvis.    Constipation noted.    Workstation performed: [id number]      LABS:  Lab data are reviewed and documented in HemOnc history.       Lab Results   Component Value Date    HGB 14.3 12/04/2023    HCT 42.6 12/04/2023     (H) 12/04/2023     12/04/2023    WBC 4.96 12/04/2023    NRBC 0 12/04/2023     Lab Results   Component Value Date    K 4.2 05/31/2024     05/31/2024    CO2 26 05/31/2024    BUN 20 05/31/2024    CREATININE 0.98 05/31/2024    GLUF 83 05/31/2024    CALCIUM 9.3 05/31/2024    AST 19 05/31/2024    ALT 16 05/31/2024    ALKPHOS 77 05/31/2024    EGFR 65 05/31/2024       No results found for: \"IRON\", \"TIBC\", \"FERRITIN\"    No results found for: \"EOLPRLXI46\"    No results for input(s): \"WBC\", \"CREAT\", \"PLT\" in the last 72 hours.    By:  Hortencia Montague MD, 6/25/2024, 9:12 AM                                  "

## 2024-06-25 ENCOUNTER — OFFICE VISIT (OUTPATIENT)
Dept: HEMATOLOGY ONCOLOGY | Facility: CLINIC | Age: 54
End: 2024-06-25
Payer: COMMERCIAL

## 2024-06-25 VITALS
DIASTOLIC BLOOD PRESSURE: 68 MMHG | WEIGHT: 157 LBS | TEMPERATURE: 97.4 F | BODY MASS INDEX: 24.64 KG/M2 | OXYGEN SATURATION: 97 % | RESPIRATION RATE: 18 BRPM | HEIGHT: 67 IN | SYSTOLIC BLOOD PRESSURE: 116 MMHG | HEART RATE: 65 BPM

## 2024-06-25 DIAGNOSIS — Z85.038 PERSONAL HISTORY OF COLON CANCER: Primary | ICD-10-CM

## 2024-06-25 PROCEDURE — 99214 OFFICE O/P EST MOD 30 MIN: CPT | Performed by: INTERNAL MEDICINE

## 2024-07-11 ENCOUNTER — OFFICE VISIT (OUTPATIENT)
Dept: SURGICAL ONCOLOGY | Facility: CLINIC | Age: 54
End: 2024-07-11
Payer: COMMERCIAL

## 2024-07-11 VITALS
BODY MASS INDEX: 24.28 KG/M2 | WEIGHT: 155 LBS | HEART RATE: 69 BPM | OXYGEN SATURATION: 97 % | TEMPERATURE: 98 F | SYSTOLIC BLOOD PRESSURE: 140 MMHG | DIASTOLIC BLOOD PRESSURE: 80 MMHG

## 2024-07-11 DIAGNOSIS — Z08 ENCOUNTER FOR FOLLOW-UP SURVEILLANCE OF COLON CANCER: Primary | ICD-10-CM

## 2024-07-11 DIAGNOSIS — K59.1 DIARRHEA, FUNCTIONAL: ICD-10-CM

## 2024-07-11 DIAGNOSIS — Z85.038 ENCOUNTER FOR FOLLOW-UP SURVEILLANCE OF COLON CANCER: Primary | ICD-10-CM

## 2024-07-11 DIAGNOSIS — Z85.038 PERSONAL HISTORY OF COLON CANCER: ICD-10-CM

## 2024-07-11 PROCEDURE — 99214 OFFICE O/P EST MOD 30 MIN: CPT | Performed by: STUDENT IN AN ORGANIZED HEALTH CARE EDUCATION/TRAINING PROGRAM

## 2024-07-11 NOTE — ASSESSMENT & PLAN NOTE
Pt continues to have crampy pain and diarrhea after PO intake daily. Encouraged course of probiotics and f/u with GI to eval for need for stool studies or daily laxative given lack of IC valve.

## 2024-07-11 NOTE — PROGRESS NOTES
Surgical Oncology Follow Up    [facility] [place]  [address] [id number] [phone]    Irma Gee  1970  [id number]\      ASSESSMENT AND PLAN    1. Encounter for follow-up surveillance of colon cancer  2. Personal history of colon cancer  Assessment & Plan:  53 yo female s/p robo RT gustavo 6/2023. T1N1 on path s/p 3 mo adjuvant therapy. Scans & c-scope recently MARGARITA.  Will see her 6 mo with scan.   3. Diarrhea, functional  Assessment & Plan:  Pt continues to have crampy pain and diarrhea after PO intake daily. Encouraged course of probiotics and f/u with GI to eval for need for stool studies or daily laxative given lack of IC valve.         Oncology History   Personal history of colon cancer   5/23/2023 Biopsy    A. Transverse colon polyp cold snare, polypectomy:  -   Fragments of sessile serrated adenoma (deeper levels examined).     -   Separate scant fragments of tubular adenoma, likely tissue contaminant.       B. Sigmoid colon polyp hot snare, polypectomy:  -   Invasive adenocarcinoma, moderately differentiated, arising in fragments of traditional serrated adenoma.  -   MMR (MLH1, MSH2, MSH6 and PMS2) studies by immunohistochemistry on B2 are pending.  -   See comment and synoptic report.     6/8/2023 Initial Diagnosis    Colon adenocarcinoma (HCC)     6/23/2023 Surgery    A. Sigmoid colon, sigmoidectomy:  -   Sigmoid colon with tattoo pigment and multinucleated giant cell reaction.  -   Negative for residual adenoma and carcinoma.   -   Resection margins are negative for adenoma and carcinoma.   -   Metastatic adenocarcinoma involving one of 19 lymph nodes (1/19, pN1a).     7/6/2023 -  Cancer Staged    Staging form: Colon and Rectum, AJCC 8th Edition  - Pathologic: Stage IIIA (pT1, pN1, cM0) - Signed by Ileana Locke MD on 7/6/2023 7/31/2023 - 8/21/2023 Chemotherapy    alteplase (CATHFLO), 2 mg, Intracatheter,  Every 1 Minute as needed, 2 of 4 cycles  oxaliplatin (ELOXATIN) chemo infusion, 245.7 mg (135 % of original dose 100 mg/m2), Intravenous, Once, 2 of 4 cycles  Dose modification: 135 mg/m2 (original dose 100 mg/m2, Cycle 1, Reason: Dose modified as per discussion with consulting physician)  Administration: 250 mg (7/31/2023), 250 mg (8/21/2023)         Staging: T1N1 colon cancer  Treatment history: 6/2023 robot RT colectomy  Current treatment: obs   Disease status: MARGARITA    History of Present Illness: Interval f/u after RT hemicolectomy robo. Path T1N1. Did two cycles CAPOX and then xeloda for total of 4 mo adjuvant treatment. Feels overall well. Stools after every meal still. Has not seen GI for these issues. Recent scan and one year postop scope MARGARITA.     Review of Systems  Complete ROS Surg Onc:   Constitutional: The patient denies new or recent history of general fatigue, no recent weight loss, no change in appetite.   Eyes: No complaints of visual problems, no scleral icterus.   ENT: no complaints of ear pain, no hoarseness, no difficulty swallowing,  no tinnitus and no new masses in head, oral cavity, or neck.   Cardiovascular: No complaints of chest pain, no palpitations, no ankle edema.   Respiratory: No complaints of shortness of breath, no cough.   Gastrointestinal: No complaints of jaundice, no bloody stools, no pale stools.   Genitourinary: No complaints of dysuria, no hematuria, no nocturia, no frequent urination, no urethral discharge.   Musculoskeletal: No complaints of weakness, paralysis, joint stiffness or arthralgias.  Integumentary: No complaints of rash, no new lesions.   Neurological: No complaints of convulsions, no seizures, no dizziness.   Hematologic/Lymphatic: No complaints of easy bruising.   Endocrine:  No hot or cold intolerance.  No polydipsia, polyphagia, or polyuria.  Allergy/immunology:  No environmental allergies.  No food allergies.  Not immunocompromised.  Skin:  No pallor or rash.   No wound.        Patient Active Problem List   Diagnosis    Personal history of colon cancer    Encounter for follow-up surveillance of colon cancer     Past Medical History:   Diagnosis Date    Abnormal Pap smear of cervix     Colon cancer (HCC)     Sinus congestion     chronic    Varicella      Past Surgical History:   Procedure Laterality Date    BREAST EXCISIONAL BIOPSY Right 2008    benign    FOOT NEUROMA SURGERY Left     FOOT SURGERY      HEMICOLOECTOMY W/ ANASTOMOSIS N/A 2023    Procedure: LAPAROSCOPIC SIGMOID COLECTOMY WITH ROBOTICS, FLEXIBLE PROCTOSCOPY;  Surgeon: Ileana Locke MD;  Location: [facility];  Service: Surgical Oncology    US BREAST NEEDLE LOC RIGHT Right 10/08/2008     Family History   Problem Relation Age of Onset    Diabetes Mother     No Known Problems Father     Breast cancer Maternal Grandmother         50-60's    Stroke Maternal Grandmother     Diabetes Maternal Grandmother     Cancer Maternal Grandfather     No Known Problems Paternal Grandmother     No Known Problems Brother     No Known Problems Half-Sister     Breast cancer Maternal Aunt         68-70    Endometrial cancer Maternal Aunt         maybe 40's    No Known Problems Maternal Aunt     Colon cancer Neg Hx     Ovarian cancer Neg Hx      Social History     Socioeconomic History    Marital status: /Civil Union     Spouse name: Not on file    Number of children: Not on file    Years of education: Not on file    Highest education level: Not on file   Occupational History    Not on file   Tobacco Use    Smoking status: Former     Current packs/day: 0.00     Types: Cigarettes     Quit date:      Years since quittin.5    Smokeless tobacco: Never   Vaping Use    Vaping status: Never Used   Substance and Sexual Activity    Alcohol use: Yes     Comment: social    Drug use: Never    Sexual activity: Yes     Partners: Male     Birth control/protection: I.U.D.     Comment: no new partner in past year   Other  Topics Concern    Not on file   Social History Narrative    Not on file     Social Determinants of Health     Financial Resource Strain: Low Risk  (11/6/2023)    [facility]    Overall Financial Resource Strain (CARDIA)     Difficulty of Paying Living Expenses: Not hard at all   Food Insecurity: No Food Insecurity (11/6/2023)    [facility]    Hunger Vital Sign     Worried About Running Out of Food in the Last Year: Never true     Ran Out of Food in the Last Year: Never true   Transportation Needs: No Transportation Needs (11/6/2023)    [facility]    PRAPARE - Transportation     Lack of Transportation (Medical): No     Lack of Transportation (Non-Medical): No   Physical Activity: Sufficiently Active (11/6/2023)    [facility]    Exercise Vital Sign     Days of Exercise per Week: 5 days     Minutes of Exercise per Session: 50 min   Stress: Stress Concern Present (11/6/2023)    [facility]    [employer] [place] [employer] - Occupational Stress Questionnaire     Feeling of Stress : To some extent   Social Connections: Not on file   Intimate Partner Violence: Not on file   Housing Stability: Low Risk  (11/6/2023)    [facility]    Housing Stability Vital Sign     Unable to Pay for Housing in the Last Year: No     Number of Places Lived in the Last Year: 1     In the last 12 months, was there a time when you did not have a steady place to sleep or slept in a shelter (including now)?: No        Current Outpatient Medications:     cholecalciferol (VITAMIN D3) 1,000 units tablet, Take 2,000 Units by mouth daily, Disp: , Rfl:     Levonorgestrel (MIRENA) 20 MCG/DAY IUD, 1 each by Intrauterine route once, Disp: , Rfl:     celecoxib (CeleBREX) 200 mg capsule, Take 1 capsule (200 mg total) by mouth daily for 7 days (Patient not taking: Reported on 7/6/2023), Disp: 7 capsule, Rfl: 0    enoxaparin (Lovenox) 40 mg/0.4 mL, Inject 0.4 mL (40 mg total) under the skin in the morning for 25 days (Patient not taking: Reported on 8/7/2023), Disp: 10 mL, Rfl: 0    gabapentin (NEURONTIN) 300 mg capsule, Take 1 capsule (300 mg total) by mouth 3 (three) times a day for 7 days (Patient not taking: Reported on 7/6/2023), Disp: 21 capsule, Rfl: 0    OLANZapine (ZyPREXA) 5 mg tablet, take 1 tablet by mouth at bedtime (Patient not taking: Reported on 12/13/2023), Disp: 30 tablet, Rfl: 0    prochlorperazine (COMPAZINE) 10 mg tablet, Take 1 tablet (10 mg total) by mouth every 8 (eight) hours as needed for nausea or vomiting (Patient not taking: Reported on 12/13/2023), Disp: 30 tablet, Rfl: 0  No Known Allergies  Vitals:    07/11/24 0952   BP: 140/80   Pulse: 69   Temp: 98 °F (36.7 °C)   SpO2: 97%       Physical Exam  Constitutional: Patient is well-developed and well-nourished who appears the stated age in no acute distress. Patient is pleasant and talkative.     HEENT:  Normocephalic.  Sclerae are anicteric. Mucous membranes are moist. Neck is supple without adenopathy. No JVD.     Chest: Equal chest rise, easy WOB  Cardiac: Heart is regular rate.     Abdomen: Abdomen is non-tender, non-distended and without masses.   Incisions healing well  Extremities: There is no clubbing or cyanosis. There is no edema.  Symmetric.  Neuro: Grossly nonfocal. Gait is normal.     Lymphatic: No evidence of cervical adenopathy bilaterally. No evidence of axillary adenopathy bilaterally. No evidence of inguinal adenopathy bilaterally.     Skin:  Warm, anicteric.    Psych:  Patient is pleasant and talkative.    Pathology:  As above    Labs:  Reviewed in Epic personally    Imaging  CT chest abdomen pelvis w contrast    Result Date: 6/12/2023  Narrative: CT CHEST, ABDOMEN AND PELVIS WITH IV CONTRAST INDICATION:   C18.9: Malignant neoplasm of colon, unspecified. COMPARISON:  None. TECHNIQUE: CT examination of the chest, abdomen and pelvis was performed. Multiplanar 2D reformatted images were created from the source data. This examination, like all CT scans performed in the [facility], was performed utilizing techniques to minimize radiation dose exposure, including the use of iterative reconstruction and automated exposure control. Radiation dose length product (DLP) for this visit:  717.16 mGy-cm IV Contrast:  80 mL of iohexol (OMNIPAQUE) Enteric Contrast: Enteric contrast was not administered. FINDINGS: CHEST LUNGS: No worrisome pulmonary nodule. There is no tracheal or endobronchial lesion. PLEURA:  Unremarkable. HEART/GREAT VESSELS: Heart is unremarkable for patient's age.  No thoracic aortic aneurysm. MEDIASTINUM AND BRYAN:  Unremarkable. CHEST WALL AND LOWER NECK:  Unremarkable. ABDOMEN LIVER/BILIARY TREE:  Unremarkable. GALLBLADDER:  No calcified gallstones. No pericholecystic inflammatory change. SPLEEN:  Unremarkable. PANCREAS:  Unremarkable. ADRENAL GLANDS:  Unremarkable. KIDNEYS/URETERS: One or more sharply circumscribed subcentimeter renal hypodensities are noted. These lesions are too small to accurately characterize, but are statistically most likely to represent benign cortical renal cyst(s).  According to the guidelines published in the White Paper of the ACR Incidental Findings Committee (Radiology 2010), no further workup of these lesions is recommended. No hydronephrosis. STOMACH AND BOWEL:  Unremarkable. APPENDIX:  No findings to suggest appendicitis. ABDOMINOPELVIC CAVITY:  No ascites.  No pneumoperitoneum.  No  lymphadenopathy. VESSELS:  Unremarkable for patient's age. PELVIS REPRODUCTIVE ORGANS: There is an intrauterine device in the uterus. URINARY BLADDER:  Unremarkable. ABDOMINAL WALL/INGUINAL REGIONS:  Unremarkable. OSSEOUS STRUCTURES:  No acute fracture or destructive osseous lesion.     Impression: No evidence for metastatic disease in the chest, abdomen, or pelvis. Workstation performed: [id number]       I reviewed the above laboratory and imaging data incl recent CT which is MARGARITA, C-SCOPE, PATH, LABS

## 2024-10-23 ENCOUNTER — TELEPHONE (OUTPATIENT)
Dept: SURGICAL ONCOLOGY | Facility: CLINIC | Age: 54
End: 2024-10-23

## 2024-11-26 ENCOUNTER — APPOINTMENT (OUTPATIENT)
Dept: LAB | Facility: HOSPITAL | Age: 54
End: 2024-11-26
Payer: COMMERCIAL

## 2024-11-26 DIAGNOSIS — Z08 ENCOUNTER FOR FOLLOW-UP SURVEILLANCE OF COLON CANCER: ICD-10-CM

## 2024-11-26 DIAGNOSIS — Z85.038 ENCOUNTER FOR FOLLOW-UP SURVEILLANCE OF COLON CANCER: ICD-10-CM

## 2024-11-26 DIAGNOSIS — Z85.038 PERSONAL HISTORY OF COLON CANCER: ICD-10-CM

## 2024-11-26 LAB
ALBUMIN SERPL BCG-MCNC: 4.3 G/DL (ref 3.5–5)
ALP SERPL-CCNC: 83 U/L (ref 34–104)
ALT SERPL W P-5'-P-CCNC: 25 U/L (ref 7–52)
ANION GAP SERPL CALCULATED.3IONS-SCNC: 6 MMOL/L (ref 4–13)
AST SERPL W P-5'-P-CCNC: 21 U/L (ref 13–39)
BASOPHILS # BLD AUTO: 0.03 THOUSANDS/ΜL (ref 0–0.1)
BASOPHILS NFR BLD AUTO: 1 % (ref 0–1)
BILIRUB SERPL-MCNC: 0.77 MG/DL (ref 0.2–1)
BUN SERPL-MCNC: 23 MG/DL (ref 5–25)
CALCIUM SERPL-MCNC: 9.3 MG/DL (ref 8.4–10.2)
CEA SERPL-MCNC: 0.9 NG/ML (ref 0–3)
CHLORIDE SERPL-SCNC: 101 MMOL/L (ref 96–108)
CO2 SERPL-SCNC: 29 MMOL/L (ref 21–32)
CREAT SERPL-MCNC: 0.94 MG/DL (ref 0.6–1.3)
EOSINOPHIL # BLD AUTO: 0.15 THOUSAND/ΜL (ref 0–0.61)
EOSINOPHIL NFR BLD AUTO: 3 % (ref 0–6)
ERYTHROCYTE [DISTWIDTH] IN BLOOD BY AUTOMATED COUNT: 12.9 % (ref 11.6–15.1)
GFR SERPL CREATININE-BSD FRML MDRD: 68 ML/MIN/1.73SQ M
GLUCOSE P FAST SERPL-MCNC: 91 MG/DL (ref 65–99)
HCT VFR BLD AUTO: 41 % (ref 34.8–46.1)
HGB BLD-MCNC: 13.7 G/DL (ref 11.5–15.4)
IMM GRANULOCYTES # BLD AUTO: 0.01 THOUSAND/UL (ref 0–0.2)
IMM GRANULOCYTES NFR BLD AUTO: 0 % (ref 0–2)
LYMPHOCYTES # BLD AUTO: 2.02 THOUSANDS/ΜL (ref 0.6–4.47)
LYMPHOCYTES NFR BLD AUTO: 40 % (ref 14–44)
MCH RBC QN AUTO: 30.9 PG (ref 26.8–34.3)
MCHC RBC AUTO-ENTMCNC: 33.4 G/DL (ref 31.4–37.4)
MCV RBC AUTO: 93 FL (ref 82–98)
MONOCYTES # BLD AUTO: 0.38 THOUSAND/ΜL (ref 0.17–1.22)
MONOCYTES NFR BLD AUTO: 7 % (ref 4–12)
NEUTROPHILS # BLD AUTO: 2.53 THOUSANDS/ΜL (ref 1.85–7.62)
NEUTS SEG NFR BLD AUTO: 49 % (ref 43–75)
NRBC BLD AUTO-RTO: 0 /100 WBCS
PLATELET # BLD AUTO: 229 THOUSANDS/UL (ref 149–390)
PMV BLD AUTO: 10.5 FL (ref 8.9–12.7)
POTASSIUM SERPL-SCNC: 4.4 MMOL/L (ref 3.5–5.3)
PROT SERPL-MCNC: 7.2 G/DL (ref 6.4–8.4)
RBC # BLD AUTO: 4.43 MILLION/UL (ref 3.81–5.12)
SODIUM SERPL-SCNC: 136 MMOL/L (ref 135–147)
WBC # BLD AUTO: 5.12 THOUSAND/UL (ref 4.31–10.16)

## 2024-11-26 PROCEDURE — 80053 COMPREHEN METABOLIC PANEL: CPT

## 2024-11-26 PROCEDURE — 82378 CARCINOEMBRYONIC ANTIGEN: CPT

## 2024-11-26 PROCEDURE — 36415 COLL VENOUS BLD VENIPUNCTURE: CPT

## 2024-11-26 PROCEDURE — 85025 COMPLETE CBC W/AUTO DIFF WBC: CPT

## 2024-11-27 ENCOUNTER — RESULTS FOLLOW-UP (OUTPATIENT)
Dept: GASTROENTEROLOGY | Facility: CLINIC | Age: 54
End: 2024-11-27

## 2024-11-29 NOTE — PROGRESS NOTES
Hematology/Oncology Outpatient Office Note    Date of Service: 2024    [facility] [place]  [address]  [place] [address]  [phone]    Reason for Consultation:   Chief Complaint   Patient presents with    Follow-up       Cancer Pathologic Stage at diagnosis: IIIA    Referral Physician: No ref. provider found    Primary Care Physician:  Kris Mares DO     Nickname: Irma    Spouse: Song    No kids    Original ECO     Today's ECO    Goals and Barriers:  Current Goal: Minimize effects of disease burden, extend life.   Barriers to accomplishing this: None    Patient's Capacity to Self Care:  Patient is able to self care      ASSESSMENT & PLAN      Diagnosis ICD-10-CM Associated Orders   1. Personal history of colon cancer  Z85.038             This is a 54 y.o. c PMHx notable for sinus congestion, being seen in consultation for early stage colon adenocarcinoma on surveillance      Discussion of decision making  Oncology history updated, accordingly, during this visit  Goals of care/patient communication  I discussed with the patient the clinical course leading up to their cancer diagnosis. I reviewed relevant office notes, imaging reports and pathology result as well.  I told the patient that this is a case of curable disease and what this means. We discussed that the goal of anti-cancer therapy is to provide best quality of life, extend overall survival, and progression free survival as shown in clinical trials. We also discussed that there might be a point when the cancer will no longer respond to this anti-neoplastic therapy.   I explained the risks/benefits of the proposed cancer therapy: CAPOX for 3 months and after discussion including understanding risks of possible life-threatening complications and therapy-related malignancy development, informed consent for blood products and treatment has been signed and obtained.  TNM/Staging At  Diagnosis  Cancer Staging  Colon adenocarcinoma (HCC)  Staging form: Colon and Rectum, AJCC 8th Edition  - Pathologic: Stage IIIA (pT1, pN1, cM0) - Signed by Ileana Locke MD on 7/6/2023  Disease Features/Tumor Markers/Genetics  Tumor Marker: CEA   7/21/2023 0.8  12/4/2023: 1.4  1/5/2024: signatera negative  3/31/2024: signatera negative  5/31/2024: CEA 0.8  11/26/2024: CEA 0.9  Notable Path Features: MMR-proficient, 1/19 LN involved  Treatment: CAPOX  Other Supportive care:   Treatment Team Members  Surgeon: Dr. Locke  Rad Onc  Palliative  Labs: 7/21/2023: Hgb 13.3, WBC 5k, plt 240k  Diagnostics  6/11/2023 CT CAP w/c: No evidence for metastatic disease in the chest, abdomen, or pelvis  7/28/2023: signatera negative  9/1/2023 signatera negative  11/6/2023 signatera: negative  12/11/2023 CT CAP w/c: No evidence of metastatic disease in the chest, abdomen, or pelvis.  6/3/2024 CT CAP w/c: No new findings to suggest metastatic disease in the chest, abdomen, or pelvis   12/2/2024 CT CAP w/c: No recurrent or metastatic disease. No pulmonary lesions. No abdominal or pelvic lymphadenopathy. Partial sigmoid resection with patent anastomosis. No evidence of hepatic metastatic disease      This is a patient with Stage III L sided colon cancer with only one high risk features (LN). The patient is MMR-proficient.     From an overall performance status basis, I believe the patient can tolerate systemic treatment.    Prognosis is more hopeful as we are dealing with an originating left sided.    Discussion of disease response monitoring:  We discussed with the patient at length the role of imaging and potential blood monitoring of burden of disease. In addition, we discussed at length the role of increasingly recognized peripheral blood monitoring of disease burden.  Cell-free Circulating Tumor DNA Variant Allele Frequency has been associated with survival in metastatic cancer, albeit mainly determined in retrospective studies  to date (https://pubmed.ncbi.nlm.nih.gov/75576944/). The patient had all of their questions and concerns answered and we will elect to pursue this.      Restaging CT Chest/abdomen/pelvis (CAP) scans will be planned q6 months for 5 years. H&P q3-6 months in the first 2 years and then q6 months thereafter until year 5. CEA q6 months. C-scope 12 months after surgery (3-6 months if there was a pre-obstructing lesion prior to surgery not allowing c-scope to be done)      Discussion of decision making    I personally reviewed the following lab results, the image studies, pathology, other specialty/physicians consult notes and recommendations, and outside medical records. I had a lengthy discussion with the patient and shared the work-up findings. We discussed the diagnosis and management plan as below. I spent 31 minutes reviewing the records (labs, clinician notes, outside records, medical history, ordering medicine/tests/procedures, interpreting the imaging/labs previously done) and coordination of care as well as direct time with the patient today, of which greater than 50% of the time was spent in counseling and coordination of care with the patient/family.    Plan/Labs  Restaging CT CAP ordered in 6 months along with preceding CEA, CMP  Signatera surveillance (renewed orders for next 6 months)  F/u Surg-Onc for post surgery care, due for 3 year C-scope 5/2027        Follow Up: 6 months    All questions were answered to the patient's satisfaction during this encounter. The patient knows the contact information for our office and knows to reach out for any relevant concerns related to this encounter. They are to call for any temperature 100.4 or higher, new symptoms including but not restricted to shaking chills, decreased appetite, nausea, vomiting, diarrhea, increased fatigue, shortness of breath or chest pain, confusion, and not feeling the strength to come to the clinic. For all other listed problems and medical  diagnosis in their chart - they are managed by PCP and/or other specialists, which the patient acknowledges. Thank you very much for your consultation and making us a part of this patient's care. We are continuing to follow closely with you. Please do not hesitate to reach out to me with any additional questions or concerns.    Hortencia Montague MD  Hematology & Medical Oncology Staff Physician             Disclaimer: This document was prepared using [employer] technology. If a word or phrase is confusing, or does not make sense, this is likely due to recognition error which was not discovered during this clinician's review. If you believe an error has occurred, please contact me through [employer] service line for elias?cation.      ONCOLOGY HISTORY OF PRESENT ILLNESS        Oncology History   Personal history of colon cancer   5/23/2023 Biopsy    A. Transverse colon polyp cold snare, polypectomy:  -   Fragments of sessile serrated adenoma (deeper levels examined).     -   Separate scant fragments of tubular adenoma, likely tissue contaminant.       B. Sigmoid colon polyp hot snare, polypectomy:  -   Invasive adenocarcinoma, moderately differentiated, arising in fragments of traditional serrated adenoma.  -   MMR (MLH1, MSH2, MSH6 and PMS2) studies by immunohistochemistry on B2 are pending.  -   See comment and synoptic report.     6/8/2023 Initial Diagnosis    Colon adenocarcinoma (HCC)     6/23/2023 Surgery    A. Sigmoid colon, sigmoidectomy:  -   Sigmoid colon with tattoo pigment and multinucleated giant cell reaction.  -   Negative for residual adenoma and carcinoma.   -   Resection margins are negative for adenoma and carcinoma.   -   Metastatic adenocarcinoma involving one of 19 lymph nodes (1/19, pN1a).     7/6/2023 -  Cancer Staged    Staging form: Colon and Rectum, AJCC 8th Edition  - Pathologic: Stage IIIA (pT1, pN1, cM0) - Signed by Ileana Locke MD on 7/6/2023 7/31/2023 - 8/21/2023  Chemotherapy    alteplase (CATHFLO), 2 mg, Intracatheter, Every 1 Minute as needed, 2 of 4 cycles  oxaliplatin (ELOXATIN) chemo infusion, 245.7 mg (135 % of original dose 100 mg/m2), Intravenous, Once, 2 of 4 cycles  Dose modification: 135 mg/m2 (original dose 100 mg/m2, Cycle 1, Reason: Dose modified as per discussion with consulting physician)  Administration: 250 mg (7/31/2023), 250 mg (8/21/2023)       Status post robotic sigmoidectomy [6/26/2023-Dr. Locke].  Mismatch repair protein intact. Her cancer was identified by routine colonoscopy [3/24/23].  Results of the colonoscopy with biopsy of one of the 2 polyps (sigmoid) that were removed showed invasive adenocarcinoma, moderately differentiated, arising in fragments of traditional serrated adenoma.  Patient underwent subsequent sigmoid colectomy which showed metastatic adenocarcinoma involving one of 19 lymph nodes (1/19, pN1a).   Patient denies any complications.  Denies any blood in stool or urine    CAPOX (Capecitabine 1000 mg/m2) was given q3 weeks for 2 cycles, and then discontinued oxaliplatin as she had 2 negative Signatera ct DNA and she decided to not pursue any further Oxaliplatin as a result due to neuropathy concerns    SUBJECTIVE  (INTERVAL HISTORY)      Clotting History None   Bleeding History None   Cancer History Colon   Family Cancer History pGrandfather (colon), mGrandmother (breast), mAunt (breast), mAunt (uterine)   H/O Blood/Plt Transfusion None   Tobacco/etoh/drug abuse 15-pack-year smoking history [quit 2012]    Hx COVID19 Infection and Vaccine Status January 2022 COVID, vaccinated    Cancer Screening history up-to-date,  Next colonoscopy due 6/2024   Occupation retired, was working for [employer]     9/11/2023: C3 Xeloda  Week of 10/2/2023: C4 Xeloda    Gets sinus infections/URI q3 months since her cancer treatments.    Doing very well overall.    Doing her occasional jogging and doing lifting.     I have reviewed the relevant past  medical, surgical, social and family history. I have also reviewed allergies and medications for this patient.    Review of Systems    Baseline weight: 155 lbs    Denies F/C, N/V, SOB, CP, LH, HA, rash, itching, gen weakness, melena, hematuria, hematochezia, falls, diarrhea, or constipation       A 10-point review of system was performed, pertinent positive and negative were detailed as above. Otherwise, the 10-point review of system was negative.      Past Medical History:   Diagnosis Date    Abnormal Pap smear of cervix     Colon cancer (HCC) 2023    Sinus congestion     chronic    Varicella        Past Surgical History:   Procedure Laterality Date    BREAST EXCISIONAL BIOPSY Right 2008    benign    FOOT NEUROMA SURGERY Left     FOOT SURGERY      HEMICOLOECTOMY W/ ANASTOMOSIS N/A 6/23/2023    Procedure: LAPAROSCOPIC SIGMOID COLECTOMY WITH ROBOTICS, FLEXIBLE PROCTOSCOPY;  Surgeon: Ileana Locke MD;  Location: [facility];  Service: Surgical Oncology    US BREAST NEEDLE LOC RIGHT Right 10/08/2008       Family History   Problem Relation Age of Onset    Diabetes Mother     No Known Problems Father     Breast cancer Maternal Grandmother         50-60's    Stroke Maternal Grandmother     Diabetes Maternal Grandmother     Cancer Maternal Grandfather     No Known Problems Paternal Grandmother     No Known Problems Brother     No Known Problems Half-Sister     Breast cancer Maternal Aunt         68-70    Endometrial cancer Maternal Aunt         maybe 40's    No Known Problems Maternal Aunt     Colon cancer Neg Hx     Ovarian cancer Neg Hx        Social History     Socioeconomic History    Marital status: /Civil Union     Spouse name: Not on file    Number of children: Not on file    Years of education: Not on file    Highest education level: Not on file   Occupational History    Not on file   Tobacco Use    Smoking status: Former     Current packs/day: 0.00     Types: Cigarettes     Quit date: 2004     Years  since quittin.9    Smokeless tobacco: Never   Vaping Use    Vaping status: Never Used   Substance and Sexual Activity    Alcohol use: Yes     Comment: social    Drug use: Never    Sexual activity: Yes     Partners: Male     Birth control/protection: I.U.D.     Comment: no new partner in past year   Other Topics Concern    Not on file   Social History Narrative    Not on file     Social Drivers of Health     Financial Resource Strain: Low Risk  (2024)    Received from [facility]    Overall Financial Resource Strain (CARDIA)     Difficulty of Paying Living Expenses: Not hard at all   Food Insecurity: No Food Insecurity (2024)    Received from [facility]    Hunger Vital Sign     Worried About Running Out of Food in the Last Year: Never true     Ran Out of Food in the Last Year: Never true   Transportation Needs: No Transportation Needs (2024)    Received from [facility]    PRAPARE - Transportation     Lack of Transportation (Medical): No     Lack of Transportation (Non-Medical): No   Physical Activity: Sufficiently Active (2024)    Received from [facility]    Exercise Vital Sign     Days of Exercise per Week: 5 days     Minutes of Exercise per Session: 50 min   Stress: Stress Concern Present (2024)    Received from [facility]    Citizen of [place] [employer] - Occupational Stress Questionnaire     Feeling of Stress : To some extent   Social Connections: Not on file   Intimate Partner Violence: Not on file   Housing Stability: Low Risk  (2024)    Received from [facility]    Housing Stability Vital Sign     Unable to Pay for Housing in the Last Year: No     Number of Times Moved in the Last Year: 0     Homeless in the Last Year: No       No Known Allergies    Current Outpatient Medications    Medication Sig Dispense Refill    cholecalciferol (VITAMIN D3) 1,000 units tablet Take 2,000 Units by mouth daily      Levonorgestrel (MIRENA) 20 MCG/DAY IUD 1 each by Intrauterine route once      celecoxib (CeleBREX) 200 mg capsule Take 1 capsule (200 mg total) by mouth daily for 7 days (Patient not taking: Reported on 7/6/2023) 7 capsule 0    enoxaparin (Lovenox) 40 mg/0.4 mL Inject 0.4 mL (40 mg total) under the skin in the morning for 25 days (Patient not taking: Reported on 8/7/2023) 10 mL 0    gabapentin (NEURONTIN) 300 mg capsule Take 1 capsule (300 mg total) by mouth 3 (three) times a day for 7 days (Patient not taking: Reported on 7/6/2023) 21 capsule 0    OLANZapine (ZyPREXA) 5 mg tablet take 1 tablet by mouth at bedtime (Patient not taking: Reported on 12/13/2023) 30 tablet 0    prochlorperazine (COMPAZINE) 10 mg tablet Take 1 tablet (10 mg total) by mouth every 8 (eight) hours as needed for nausea or vomiting (Patient not taking: Reported on 12/13/2023) 30 tablet 0     No current facility-administered medications for this visit.       (Not in a hospital admission)      Objective:     24 Hour Vitals Assessment:     Vitals:    12/09/24 0932   BP: 152/84   Pulse: 69   Resp: 18   Temp: (!) 96.3 °F (35.7 °C)   SpO2: 99%         Weight at last visit: 157 lbs    Weight today: 152 lbs    PHYSICIAN EXAM:    General: Appearance: alert, cooperative, no distress.  HEENT: Normocephalic, atraumatic. No scleral icterus. conjunctivae clear. EOMI.  Chest: No tenderness to palpation. No open wound noted.  Lungs: Clear to auscultation bilaterally, Respirations unlabored.  Cardiac: Regular rate, +S1and S2  Abdomen: Soft, non-tender, non-distended. Bowel sounds are normal.   Extremities:  No edema, cyanosis, clubbing.  Skin: Skin color, turgor are normal. No rashes.  Lymphatics: no palpable supra-cervical, axillary, or inguinal adenopathy  Neurologic: Awake, Alert, and oriented, no gross focal deficits noted b/l.        DATA REVIEW:    Pathology Result:    Final Diagnosis   Date Value Ref Range Status   06/23/2023   Final    A. Sigmoid colon, sigmoidectomy:  -   Sigmoid colon with tattoo pigment and multinucleated giant cell reaction.  -   Negative for residual adenoma and carcinoma.   -   Resection margins are negative for adenoma and carcinoma.   -   Metastatic adenocarcinoma involving one of 19 lymph nodes (1/19, pN1a).    Interpretation performed at [facility], [facility] [address] [place] [id number]      05/23/2023   Final    A. Transverse colon polyp cold snare, polypectomy:  -   Fragments of sessile serrated adenoma (deeper levels examined).     -   Separate scant fragments of tubular adenoma, likely tissue contaminant.      B. Sigmoid colon polyp hot snare, polypectomy:  -   Invasive adenocarcinoma, moderately differentiated, arising in fragments of traditional serrated adenoma.  -   MMR (MLH1, MSH2, MSH6 and PMS2) studies by immunohistochemistry on B2 are pending.  -   See comment and synoptic report.    Comment:   Part B) CD31/CKC immunostain on B3 is negative for lymphovascular invasion.    Select slide B2 is reviewed by Dr. Pilar Ramos who concurs with the diagnosis.    Diagnosis is communicated via Instant Information to Dr. Reese Rose on 6/1/2023 at 0932.      Interpretation performed at [facility] [address] [address]           Image Results:   Image result are reviewed and documented in Hematology/Oncology history    CT chest abdomen pelvis w contrast  Narrative: CT CHEST, ABDOMEN AND PELVIS WITH IV CONTRAST    INDICATION:   Personal history of other malignant neoplasm of large intestine. .    COMPARISON: 6/3/2024.    TECHNIQUE: CT examination of the chest, abdomen and pelvis was performed. Multiplanar 2D reformatted images were created from the source data.    This examination, like all CT scans performed in the [facility], was performed utilizing techniques to minimize  radiation dose exposure, including the use of iterative reconstruction and automated exposure control. Radiation dose length   product (DLP) for this visit:    IV Contrast: 50 cc of Omnipaque 350  Enteric Contrast: Enteric contrast was administered.    FINDINGS:    CHEST    LUNGS: Biapical pleural-parenchymal scarring. No pulmonary nodules.  There is no tracheal or endobronchial lesion.    PLEURA:  Unremarkable.    HEART/GREAT VESSELS: Heart is unremarkable for patient's age.  No thoracic aortic aneurysm.    MEDIASTINUM AND BRYAN:  Unremarkable.    CHEST WALL AND LOWER NECK:  Unremarkable.    ABDOMEN    LIVER/BILIARY TREE:  One or more subcentimeter sharply circumscribed low-density hepatic lesion(s) are noted, too small to accurately characterize, but statistically most likely to represent subcentimeter hepatic cysts.  No suspicious solid hepatic   lesion is identified.  Hepatic contours are normal.  No biliary dilatation.    GALLBLADDER:  No calcified gallstones. No pericholecystic inflammatory change.    SPLEEN:  Unremarkable.    PANCREAS:  Unremarkable.    ADRENAL GLANDS:  Unremarkable.    KIDNEYS/URETERS:  One or more simple renal cyst(s) is noted.  Otherwise unremarkable kidneys.  No hydronephrosis.    STOMACH AND BOWEL: Partial sigmoid resection with patent anastomosis. No foci of abnormal bowel wall thickening..    APPENDIX:  No findings to suggest appendicitis.    ABDOMINOPELVIC CAVITY:  No ascites.  No pneumoperitoneum.  No lymphadenopathy.    VESSELS:  Unremarkable for patient's age.    PELVIS    REPRODUCTIVE ORGANS: Uterus is normal in size with IUD in place. No adnexal mass.    URINARY BLADDER: Minimal air in the bladder which can be seen in setting of cystitis or recent instrumentation..    ABDOMINAL WALL/INGUINAL REGIONS:  Unremarkable.    OSSEOUS STRUCTURES:  No acute fracture or destructive osseous lesion.  Impression: No recurrent or metastatic disease. No pulmonary lesions. No abdominal or pelvic  "lymphadenopathy. Partial sigmoid resection with patent anastomosis. No evidence of hepatic metastatic disease.    Electronically signed: 12/03/2024 07:04 AM Yuri Hernandez MD      LABS:  Lab data are reviewed and documented in [facility]On history.       Lab Results   Component Value Date    HGB 13.7 11/26/2024    HCT 41.0 11/26/2024    MCV 93 11/26/2024     11/26/2024    WBC 5.12 11/26/2024    NRBC 0 11/26/2024     Lab Results   Component Value Date    K 4.4 11/26/2024     11/26/2024    CO2 29 11/26/2024    BUN 23 11/26/2024    CREATININE 0.94 11/26/2024    GLUF 91 11/26/2024    CALCIUM 9.3 11/26/2024    AST 21 11/26/2024    ALT 25 11/26/2024    ALKPHOS 83 11/26/2024    EGFR 68 11/26/2024       No results found for: \"IRON\", \"TIBC\", \"FERRITIN\"    No results found for: \"YNYQRWYW23\"    No results for input(s): \"WBC\", \"CREAT\", \"PLT\" in the last 72 hours.    By:  Hortencia Montague MD, 12/9/2024, 9:42 AM                                  "

## 2024-12-02 ENCOUNTER — HOSPITAL ENCOUNTER (OUTPATIENT)
Dept: CT IMAGING | Facility: HOSPITAL | Age: 54
Discharge: HOME | End: 2024-12-02
Attending: INTERNAL MEDICINE
Payer: COMMERCIAL

## 2024-12-02 DIAGNOSIS — Z85.038 PERSONAL HISTORY OF COLON CANCER: ICD-10-CM

## 2024-12-02 PROCEDURE — 74177 CT ABD & PELVIS W/CONTRAST: CPT

## 2024-12-02 PROCEDURE — 71260 CT THORAX DX C+: CPT

## 2024-12-02 RX ADMIN — IOHEXOL 50 ML: 350 INJECTION, SOLUTION INTRAVENOUS at 07:05

## 2024-12-09 ENCOUNTER — OFFICE VISIT (OUTPATIENT)
Dept: HEMATOLOGY ONCOLOGY | Facility: CLINIC | Age: 54
End: 2024-12-09
Payer: COMMERCIAL

## 2024-12-09 VITALS
WEIGHT: 152.5 LBS | HEART RATE: 69 BPM | BODY MASS INDEX: 23.93 KG/M2 | RESPIRATION RATE: 18 BRPM | OXYGEN SATURATION: 99 % | TEMPERATURE: 96.3 F | DIASTOLIC BLOOD PRESSURE: 84 MMHG | HEIGHT: 67 IN | SYSTOLIC BLOOD PRESSURE: 152 MMHG

## 2024-12-09 DIAGNOSIS — Z85.038 PERSONAL HISTORY OF COLON CANCER: Primary | ICD-10-CM

## 2024-12-09 PROCEDURE — 99214 OFFICE O/P EST MOD 30 MIN: CPT | Performed by: INTERNAL MEDICINE

## 2024-12-10 ENCOUNTER — TELEPHONE (OUTPATIENT)
Dept: SURGICAL ONCOLOGY | Facility: CLINIC | Age: 54
End: 2024-12-10

## 2024-12-10 ENCOUNTER — APPOINTMENT (OUTPATIENT)
Dept: LAB | Facility: HOSPITAL | Age: 54
End: 2024-12-10
Payer: COMMERCIAL

## 2024-12-10 DIAGNOSIS — Z85.038 PERSONAL HISTORY OF COLON CANCER: ICD-10-CM

## 2024-12-10 PROCEDURE — 36415 COLL VENOUS BLD VENIPUNCTURE: CPT

## 2024-12-10 PROCEDURE — 82784 ASSAY IGA/IGD/IGG/IGM EACH: CPT

## 2024-12-10 NOTE — TELEPHONE ENCOUNTER
Called patient to reschedule cancelled follow up with Dr. Locke, patient does not want to reschedule with Dr. Locke due to seeing Dr. Montague, patient understands how Dr. Montague is for Medical Oncology and Dr. Locke is for Surgical Oncology and patient does not want to see two oncologists she stated.

## 2024-12-11 LAB — IGG SERPL-MCNC: 1214 MG/DL (ref 635–1741)

## 2025-05-01 ENCOUNTER — OFFICE VISIT (OUTPATIENT)
Dept: OBGYN CLINIC | Facility: CLINIC | Age: 55
End: 2025-05-01
Payer: COMMERCIAL

## 2025-05-01 VITALS
DIASTOLIC BLOOD PRESSURE: 74 MMHG | BODY MASS INDEX: 24.17 KG/M2 | SYSTOLIC BLOOD PRESSURE: 134 MMHG | HEIGHT: 67 IN | WEIGHT: 154 LBS

## 2025-05-01 DIAGNOSIS — N95.1 SYMPTOMS, SUCH AS FLUSHING, SLEEPLESSNESS, HEADACHE, LACK OF CONCENTRATION, ASSOCIATED WITH THE MENOPAUSE: ICD-10-CM

## 2025-05-01 DIAGNOSIS — Z85.038 PERSONAL HISTORY OF COLON CANCER: ICD-10-CM

## 2025-05-01 DIAGNOSIS — Z01.419 ENCOUNTER FOR GYNECOLOGICAL EXAMINATION WITHOUT ABNORMAL FINDING: Primary | ICD-10-CM

## 2025-05-01 DIAGNOSIS — Z12.4 ENCOUNTER FOR PAPANICOLAOU SMEAR FOR CERVICAL CANCER SCREENING: ICD-10-CM

## 2025-05-01 PROCEDURE — S0612 ANNUAL GYNECOLOGICAL EXAMINA: HCPCS | Performed by: NURSE PRACTITIONER

## 2025-05-01 NOTE — PROGRESS NOTES
Assessment/Plan:  Calcium 1200-1500mg + 600-1000 IU Vit D daily. Exercise 150 minutes per week minimum including weight bearing exercises. Pap with high risk HPV Q 5 years.    Annual mammogram and monthly breast self exam recommended.  Is going to hold off for a little due to every 6 month CT C/A/P   H/o colon cancer 2 years out Colonoscopy- up to date       Silicone based lubricant or coconut oil with sex as needed  Menopause sx The New menopause book .           Diagnoses and all orders for this visit:    Encounter for gynecological examination without abnormal finding  -     Thinprep Tis Pap and HPV mRNA E6/E7 Reflex HPV 16,18/45    Encounter for Papanicolaou smear for cervical cancer screening  -     Thinprep Tis Pap and HPV mRNA E6/E7 Reflex HPV 16,18/45    Personal history of colon cancer    Symptoms, such as flushing, sleeplessness, headache, lack of concentration, associated with the menopause    Other orders  -     Liquid-based pap, screening          Subjective:      Patient ID: Irma Gee is a 54 y.o. female.    Here for annual gyn c/o menopause sx biggest issue joint pain/body aches/ brain fog No significant hot flashes/night sweats Denies vaginal dryness painful intercourse  Not necessarily interested in HRT prefers to do things naturally Retired from  so able to get by Works out strength training taking Creatine supplement  Has Mirena IUD since 04/2018 no period since 30s : PAP 8/15/2022 neg/neg HPV DX Stage IIIA colon cancer underwent her first screening colonoscopy, revealing 2 polyps, one at the transverse colon demonstrating sessile serrated adenoma histology, and the other at the sigmoid colon, demonstrating invasive adenocarcinoma, moderately differentiated. The sigmoid polyp was marked with a tattoo just distal to the polyp. She underwent robotic colon resection 6/23/2023 she was recommended 4 cycles CAPOX Cts every 6 months at her 2 year patricia now.  Last mammo 2022         The  "following portions of the patient's history were reviewed and updated as appropriate: allergies, current medications, past family history, past medical history, past social history, past surgical history, and problem list.    Review of Systems   Constitutional:  Negative for fatigue and unexpected weight change.   Gastrointestinal:  Negative for abdominal distention, abdominal pain, constipation and diarrhea.   Genitourinary:  Negative for difficulty urinating, dyspareunia, dysuria, frequency, genital sores, menstrual problem, pelvic pain, urgency, vaginal bleeding, vaginal discharge and vaginal pain.   Neurological:  Negative for headaches.   Psychiatric/Behavioral: Negative.  Negative for dysphoric mood. The patient is not nervous/anxious.          Objective:      /74 (BP Location: Left arm, Patient Position: Sitting, Cuff Size: Standard)   Ht 5' 7\" (1.702 m)   Wt 69.9 kg (154 lb)   BMI 24.12 kg/m²          Physical Exam  Vitals and nursing note reviewed.   Constitutional:       General: She is not in acute distress.     Appearance: Normal appearance.   HENT:      Head: Normocephalic and atraumatic.   Pulmonary:      Effort: Pulmonary effort is normal.   Chest:   Breasts:     Breasts are symmetrical.      Right: Normal. No mass, nipple discharge, skin change or tenderness.      Left: Normal. No mass, nipple discharge, skin change or tenderness.   Abdominal:      General: There is no distension.      Palpations: Abdomen is soft.      Tenderness: There is no abdominal tenderness. There is no guarding or rebound.   Genitourinary:     General: Normal vulva.      Exam position: Lithotomy position.      Labia:         Right: No rash, tenderness, lesion or injury.         Left: No rash, tenderness, lesion or injury.       Urethra: No prolapse, urethral pain, urethral swelling or urethral lesion.      Vagina: Normal. No erythema or lesions.      Cervix: No cervical motion tenderness, discharge, lesion or cervical " bleeding.      Uterus: Normal.       Adnexa: Right adnexa normal and left adnexa normal.        Right: No mass or tenderness.          Left: No mass or tenderness.        Rectum: No mass or external hemorrhoid.      Comments: IUD strings noted PAP from cervix   Musculoskeletal:         General: Normal range of motion.   Lymphadenopathy:      Upper Body:      Right upper body: No axillary adenopathy.      Left upper body: No axillary adenopathy.      Lower Body: No right inguinal adenopathy. No left inguinal adenopathy.   Skin:     General: Skin is warm and dry.   Neurological:      Mental Status: She is alert and oriented to person, place, and time.   Psychiatric:         Mood and Affect: Mood normal.         Behavior: Behavior normal.         Thought Content: Thought content normal.         Judgment: Judgment normal.

## 2025-05-01 NOTE — PATIENT INSTRUCTIONS
Calcium 1200-1500mg + 600-1000 IU Vit D daily. Exercise 150 minutes per week minimum including weight bearing exercises. Pap with high risk HPV Q 5 years.    Annual mammogram and monthly breast self exam recommended.  Is going to hold off for a little due to every 6 month CT C/A/P   H/o colon cancer 2 years out Colonoscopy- up to date       Silicone based lubricant or coconut oil with sex as needed  Menopause sx The New menopause book .

## 2025-05-05 ENCOUNTER — RESULTS FOLLOW-UP (OUTPATIENT)
Dept: OBGYN CLINIC | Facility: CLINIC | Age: 55
End: 2025-05-05

## 2025-05-07 ENCOUNTER — APPOINTMENT (OUTPATIENT)
Dept: RADIOLOGY | Facility: CLINIC | Age: 55
End: 2025-05-07
Payer: COMMERCIAL

## 2025-05-07 ENCOUNTER — OFFICE VISIT (OUTPATIENT)
Dept: OBGYN CLINIC | Facility: CLINIC | Age: 55
End: 2025-05-07
Payer: COMMERCIAL

## 2025-05-07 ENCOUNTER — APPOINTMENT (OUTPATIENT)
Dept: RADIOLOGY | Facility: CLINIC | Age: 55
End: 2025-05-07
Attending: ORTHOPAEDIC SURGERY
Payer: COMMERCIAL

## 2025-05-07 VITALS — BODY MASS INDEX: 24.01 KG/M2 | HEIGHT: 67 IN | WEIGHT: 153 LBS

## 2025-05-07 DIAGNOSIS — M16.12 PRIMARY OSTEOARTHRITIS OF LEFT HIP: Primary | ICD-10-CM

## 2025-05-07 DIAGNOSIS — M25.552 PAIN IN LEFT HIP: ICD-10-CM

## 2025-05-07 DIAGNOSIS — M25.552 PAIN IN LEFT HIP: Primary | ICD-10-CM

## 2025-05-07 PROCEDURE — 73502 X-RAY EXAM HIP UNI 2-3 VIEWS: CPT

## 2025-05-07 PROCEDURE — 99204 OFFICE O/P NEW MOD 45 MIN: CPT | Performed by: ORTHOPAEDIC SURGERY

## 2025-05-07 NOTE — PROGRESS NOTES
Assessment:     1. Primary osteoarthritis of left hip        Plan:     Problem List Items Addressed This Visit          Musculoskeletal and Integument    Primary osteoarthritis of left hip - Primary    Findings consistent with left hip primary osteoarthritis.  Discussed findings and treatment options with the patient.  I reviewed patient's left hip x-ray and prior pelvis CT scan with her.  I discussed prognosis of her hip condition.  Discussed conservative versus surgical interventions.  I recommend patient to do low impact exercises with stationary bike or swimming.  She should avoid high impact activities.  Take over-the-counter NSAID as needed.  Possible cortisone injection in the left hip joint if the pain becomes more severe.  Discussed surgical intervention with total hip arthroplasty.  Patient would like to pursue conservative management first.  I will see patient back 2- 3 months for reevaluation.  All patient's questions were answered to her satisfaction.  This note is created using dictation transcription.  It may contain typographical errors, grammatical errors, improperly dictated words, background noise and other errors.         Relevant Orders    Ambulatory Referral to Physical Therapy      Subjective:     Patient ID: Irma Gee is a 54 y.o. female.  Chief Complaint:  54-year-old female with a month duration of left hip pain.  She denies any injury.  Her pain has been gradual increase.  She is complaining of pain in her groin area.  She has been taking over-the-counter NSAID which does provide her with some pain relief.  She has increased pain with prolonged walking, running, and standing.  She also has sensation of the giving away.  Mild low back pain but no radiating symptoms.  She denied bowel or bladder problem.    Allergy:  No Known Allergies  Medications:  all current active meds have been reviewed  Past Medical History:  Past Medical History:   Diagnosis Date    Abnormal Pap smear of cervix      Cancer (HCC) 23    Schedule one year follow up colonoscopy    Colon cancer (HCC)     Sinus congestion     chronic    Varicella      Past Surgical History:  Past Surgical History:   Procedure Laterality Date    BREAST CYST ASPIRATION      I don't know dates    BREAST EXCISIONAL BIOPSY Right 2008    benign    COLON SURGERY  22    FOOT NEUROMA SURGERY Left     FOOT SURGERY      HEMICOLOECTOMY W/ ANASTOMOSIS N/A 2023    Procedure: LAPAROSCOPIC SIGMOID COLECTOMY WITH ROBOTICS, FLEXIBLE PROCTOSCOPY;  Surgeon: Ileana Locke MD;  Location: [facility];  Service: Surgical Oncology    US BREAST NEEDLE LOC RIGHT Right 10/08/2008     Family History:  Family History   Problem Relation Age of Onset    Diabetes Mother     No Known Problems Father     Breast cancer Maternal Grandmother         50-60's    Stroke Maternal Grandmother     Diabetes Maternal Grandmother     Cancer Maternal Grandfather     No Known Problems Paternal Grandmother     No Known Problems Brother     No Known Problems Half-Sister     Breast cancer Maternal Aunt         68-70    Endometrial cancer Maternal Aunt         maybe 40's    No Known Problems Maternal Aunt     Colon cancer Neg Hx     Ovarian cancer Neg Hx      Social History:  Social History     Substance and Sexual Activity   Alcohol Use Yes    Comment: social     Social History     Substance and Sexual Activity   Drug Use Never     Social History     Tobacco Use   Smoking Status Former    Current packs/day: 0.00    Types: Cigarettes    Quit date:     Years since quittin.3   Smokeless Tobacco Never     Review of Systems   Constitutional: Negative.    HENT: Negative.     Eyes: Negative.    Respiratory: Negative.     Cardiovascular: Negative.    Gastrointestinal: Negative.    Endocrine: Negative.    Genitourinary: Negative.    Musculoskeletal:  Positive for arthralgias (Left hip).   Skin: Negative.    Allergic/Immunologic: Negative.    Hematological: Negative.   "  Psychiatric/Behavioral: Negative.           Objective:  BP Readings from Last 1 Encounters:   05/01/25 134/74      Wt Readings from Last 1 Encounters:   05/07/25 69.4 kg (153 lb)      BMI:   Estimated body mass index is 23.96 kg/m² as calculated from the following:    Height as of this encounter: 5' 7\" (1.702 m).    Weight as of this encounter: 69.4 kg (153 lb).  BSA:   Estimated body surface area is 1.8 meters squared as calculated from the following:    Height as of this encounter: 5' 7\" (1.702 m).    Weight as of this encounter: 69.4 kg (153 lb).   Physical Exam  Vitals and nursing note reviewed.   Constitutional:       Appearance: Normal appearance. She is well-developed.   HENT:      Head: Normocephalic and atraumatic.      Right Ear: External ear normal.      Left Ear: External ear normal.   Eyes:      Extraocular Movements: Extraocular movements intact.      Conjunctiva/sclera: Conjunctivae normal.   Pulmonary:      Effort: Pulmonary effort is normal.   Musculoskeletal:      Cervical back: Neck supple.   Skin:     General: Skin is warm and dry.   Neurological:      Mental Status: She is alert and oriented to person, place, and time.      Deep Tendon Reflexes: Reflexes are normal and symmetric.   Psychiatric:         Mood and Affect: Mood normal.         Behavior: Behavior normal.       Left Hip Exam     Tenderness   The patient is experiencing no tenderness.     Range of Motion   The patient has normal left hip ROM.  Left hip external rotation: Pain.   Left hip internal rotation: Pain.     Muscle Strength   The patient has normal left hip strength.     Tests   YUNIER: positive  Dawna: negative    Other   Erythema: absent  Scars: absent  Sensation: normal  Pulse: present            I have personally reviewed pertinent films in PACS and my interpretation is left hip x-ray show hip dysplasia.  Narrowing of the hip joint consistent with osteoarthritis.  Patient's prior abdomen CT also show degenerative changes in " the left hip with subchondral cysts in the acetabulum.

## 2025-05-07 NOTE — ASSESSMENT & PLAN NOTE
Findings consistent with left hip primary osteoarthritis.  Discussed findings and treatment options with the patient.  I reviewed patient's left hip x-ray and prior pelvis CT scan with her.  I discussed prognosis of her hip condition.  Discussed conservative versus surgical interventions.  I recommend patient to do low impact exercises with stationary bike or swimming.  She should avoid high impact activities.  Take over-the-counter NSAID as needed.  Possible cortisone injection in the left hip joint if the pain becomes more severe.  Discussed surgical intervention with total hip arthroplasty.  Patient would like to pursue conservative management first.  I will see patient back 2- 3 months for reevaluation.  All patient's questions were answered to her satisfaction.  This note is created using dictation transcription.  It may contain typographical errors, grammatical errors, improperly dictated words, background noise and other errors.

## 2025-05-19 ENCOUNTER — EVALUATION (OUTPATIENT)
Dept: PHYSICAL THERAPY | Facility: CLINIC | Age: 55
End: 2025-05-19
Attending: ORTHOPAEDIC SURGERY
Payer: COMMERCIAL

## 2025-05-19 DIAGNOSIS — M25.552 LEFT HIP PAIN: Primary | ICD-10-CM

## 2025-05-19 PROCEDURE — 97162 PT EVAL MOD COMPLEX 30 MIN: CPT | Performed by: PHYSICAL THERAPIST

## 2025-05-19 PROCEDURE — 97110 THERAPEUTIC EXERCISES: CPT | Performed by: PHYSICAL THERAPIST

## 2025-05-19 NOTE — PROGRESS NOTES
PT Evaluation     Today's date: 2025  Patient name: Irma Gee  : 1970  MRN: [id number]  Referring provider: Carl Pat MD  Dx:   Encounter Diagnosis     ICD-10-CM    1. Left hip pain  M25.552                      Assessment  Impairments: abnormal coordination, abnormal muscle firing, abnormal muscle tone, abnormal or restricted ROM, abnormal movement, activity intolerance, impaired balance, impaired physical strength, pain with function and poor posture   Symptom irritability: moderate    Assessment details: Problem List:  1) Limited L hip mobility   2) Limited lumbar extension    Irma Gee is a pleasant 54 y.o. female who presents with signs and symptoms consistent with L hip mobility deficits leading to pain and referral down anterior L hip.  Clinically demosntrates + FADIR testing and limited L hip IR and extension deficits suggesting capsular restriction.  This leads to limited ability to perform ambulation and running due to pain and discomfort. This suggesting no further referral appears necessary at this time based upon examination results.  I expect she will benefit from at least 6 weeks of PT to maximize function and return to PLOF pain-free.        Comparable signs:  1) Unable to squat  2) Unable to rotation R   Understanding of Dx/Px/POC: good     Prognosis: good    Goals  Short Term Goals (4 weeks)  1.) Establish independence with HEP  2.) Decrease subjective pain levels from NPRS at least to 2-5/10 at rest and with activity  3.) Improve L hip ROM at least 5-10 degrees into all planes to allow for improved ease of movement with less guarding    Long Term Goals (8 weeks)  1.) Improve L hip ROM to WNL in all planes to restore normal movement with ADLs and function  2.) Improve L hip strength to 5/5 in all planes in order to return to pain-free ADLs and function  3.) Improve FOTO score at least to 75 points showing improved self reported disability        Plan  Patient would  benefit from: PT eval and skilled physical therapy  Planned modality interventions: biofeedback, cryotherapy, electrical stimulation/Russian stimulation and TENS    Planned therapy interventions: abdominal trunk stabilization, activity modification, ADL retraining, ADL training, balance, balance/weight bearing training, behavior modification, body mechanics training, coordination, compression, flexibility, functional ROM exercises, gait training, graded activity, graded exercise, graded motor, home exercise program, IADL retraining, transfer training, therapeutic training, therapeutic exercise, therapeutic activities, stretching, strengthening, sensory integrative techniques, self care, postural training, patient/caregiver education, neuromuscular re-education, nerve gliding, muscle pump exercises, motor coordination training, massage, manual therapy, IASTM and joint mobilization  Speech planned therapy intervention: NMES    Frequency: 2x week  Duration in weeks: 8  Plan of Care beginning date: 5/19/2025  Plan of Care expiration date: 7/21/2025  Treatment plan discussed with: patient  Plan details: Initiate POC for stability; monitor sxs and progress as able         Subjective Evaluation    History of Present Illness  Mechanism of injury: Irma Gee is a 54 y.o. female who presents with increased L hip pain starting 1 year ago with SAMMI that is intermittent.  Notes that tensing at her L hip or turning.  Best in the AM; worse with lifting or carrying something heavy with.  Burning pain at anterior L hip worse with carrying.  Notes mid knee/thigh worse with overuse and lifting.  Preparing for a hike may have to step up with L foot and can get a shooting pain.   Decided to seek out MD consult where X-rays were (-) for fx and script for OPPT provided.  Denies night pain or changes in bowel or bladder function.  Reports increased burning pain more frequent at anterior at anterior L hip worse also with transitions NT  signs or sxs.  F/U with MD in 3 months for a potential hip injection.  Wishes to return to PLOF pain-free run or do a tough mudder and hike.  No diffuclty with sitting, standing starts to bother; 3 miles it starts to hurt.               Not a recurrent problem   Quality of life: good    Patient Goals  Patient goals for therapy: decreased edema, decreased pain, improved balance, increased motion, increased strength, independence with ADLs/IADLs and return to sport/leisure activities    Pain  Current pain ratin  At best pain ratin  At worst pain ratin  Location: L hip  Quality: sharp, dull ache and burning  Relieving factors: medications, change in position, relaxation and rest (ibuprofen prn)  Aggravating factors: lifting, running and walking  Progression: worsening    Social Support  Steps to enter house: yes  Stairs in house: yes   3  Lives in: multiple-level home and apartment  Lives with: spouse    Employment status: not working  Exercise history: Legs with freeweights; Split routines 5 days away; HIIT workouts, jump training (better for pain)      Diagnostic Tests  X-ray: abnormal  Treatments  Previous treatment: physical therapy  Current treatment: physical therapy        Objective     Active Range of Motion     Lumbar   Flexion:  WFL  Extension:  Restriction level: maximal  Left lateral flexion:  WFL  Right lateral flexion:  with pain Restriction level: minimal  Left rotation:  WFL  Right rotation:  WFL and with pain  Left Hip   Flexion: 95 degrees   External rotation (90/90): 38 degrees   Internal rotation (90/90): 35 degrees     Right Hip   Flexion: 110 degrees   External rotation (90/90): 45 degrees   Internal rotation (90/90): 54 degrees     Strength/Myotome Testing     Lumbar   Left   Heel walk: normal  Toe walk: normal    Right   Heel walk: normal  Toe walk: normal    Left Hip   Planes of Motion   Flexion: 4+  Extension: 4+  Adduction: 5  External rotation: 4+  Internal rotation: 5    Right  Hip   Planes of Motion   Flexion: 4+  Extension: 4+  Adduction: 5  External rotation: 5  Internal rotation: 5    Left Knee   Flexion: 5  Extension: 5    Right Knee   Flexion: 5  Extension: 5    Left Ankle/Foot   Dorsiflexion: 5  Plantar flexion: 5    Right Ankle/Foot   Dorsiflexion: 5  Plantar flexion: 5    Tests     Lumbar     Left   Negative passive SLR and slump test.     Right   Negative passive SLR and slump test.     Left Pelvic Girdle/Sacrum   Positive: thigh thrust and active SLR test.     Right Pelvic Girdle/Sacrum   Negative: thigh thrust and active SLR test.     Left Hip   Positive FADIR.     Right Hip   Positive FADIR.     Functional Assessment      Squat    Left within functional limits, right within functional limits and pain.     Single Leg Stance   Left: 30 seconds  Right: 30 seconds            Daily Treatment Diary     Precautions: none  CO-MORBIDITIES: Colon cancer history 1 year  TO MD On: 7//19/25  FOTO Completed On: 5/19/25    POC Expires Reeval for Medicare to be completed  Unit Limit Auth Expiration Date PT/OT/STVisit Limit   7/21/25 By visit NA NA 12/31/25 50    Completed on visit                    Auth Status DATE 5/19        Approved Visit # 1         Remaining 49        MANUAL THERAPY         L hip PA mobilization                                                      THERAPEUTIC EXERCISE HEP         1/2 kneel stretch 10x         Prone hip ext 10x         STD ext 10x                                                                                                                                 NEUROMUSCULAR REEDUCATION                                                                                                                                                       THERAPEUTIC ACTIVITY                                                  GAIT TRAINING                                                  MODALITIES

## 2025-05-23 ENCOUNTER — OFFICE VISIT (OUTPATIENT)
Dept: PHYSICAL THERAPY | Facility: CLINIC | Age: 55
End: 2025-05-23
Attending: ORTHOPAEDIC SURGERY
Payer: COMMERCIAL

## 2025-05-23 DIAGNOSIS — M25.552 LEFT HIP PAIN: Primary | ICD-10-CM

## 2025-05-23 PROCEDURE — 97110 THERAPEUTIC EXERCISES: CPT | Performed by: PHYSICAL THERAPIST

## 2025-05-23 PROCEDURE — 97140 MANUAL THERAPY 1/> REGIONS: CPT | Performed by: PHYSICAL THERAPIST

## 2025-05-23 NOTE — PROGRESS NOTES
"Daily Note     Today's date: 2025  Patient name: Iram Gee  : 1970  MRN: [id number]  Referring provider: Carl Pat MD  Dx:   Encounter Diagnosis     ICD-10-CM    1. Left hip pain  M25.552                      Subjective: Continues to have pain and discomfort worse now.        Objective: See treatment diary below      Assessment: Noting 10 degrees on ER with prone assessment on R LE vs 20 degrees on L.  Sbing L remains limited overall.  Glute medius strength assessment limited overall grossly 4-/5. LLD noted with supine to sit test as well leading to pain.    Continues to have limited control of ERs with ambulation and added curtsy lunges and walking lunges.        Plan: Continue per plan of care.      Daily Treatment Diary     Precautions: none  CO-MORBIDITIES: Colon cancer history 1 year  TO MD On: 25  FOTO Completed On: 25    POC Expires Reeval for Medicare to be completed  Unit Limit Auth Expiration Date PT/OT/STVisit Limit   25 By visit NA NA 25 50    Completed on visit                    Auth Status DATE        Approved Visit # 1 2        Remaining 49 48       MANUAL THERAPY         L hip PA mobilization  5 min        SIJ assess andanterior MET  3 min        Prone quad stretch and ER  10 min        Test and re-test  10 min                         THERAPEUTIC EXERCISE HEP         1/2 kneel stretch 10x  10x3\" with ER       Prone hip ext 10x         STD ext 10x  2x10 with OP       Quad rock with ER   10x3\"       Squat   10x       Curtsy squat   10xea        Wlaking lunge   3x20 ft       Side stepping   5 laps 10 ft GTB       Piriformis stretch seated   3x30\"                                                                   NEUROMUSCULAR REEDUCATION                                                                                                                                                       THERAPEUTIC ACTIVITY                                                "   GAIT TRAINING                                                  MODALITIES

## 2025-05-28 ENCOUNTER — OFFICE VISIT (OUTPATIENT)
Dept: FAMILY MEDICINE CLINIC | Facility: CLINIC | Age: 55
End: 2025-05-28
Payer: COMMERCIAL

## 2025-05-28 VITALS
DIASTOLIC BLOOD PRESSURE: 82 MMHG | HEIGHT: 67 IN | BODY MASS INDEX: 24.39 KG/M2 | SYSTOLIC BLOOD PRESSURE: 140 MMHG | WEIGHT: 155.4 LBS | OXYGEN SATURATION: 98 % | HEART RATE: 70 BPM

## 2025-05-28 DIAGNOSIS — M16.12 PRIMARY OSTEOARTHRITIS OF LEFT HIP: ICD-10-CM

## 2025-05-28 DIAGNOSIS — Z12.31 ENCOUNTER FOR SCREENING MAMMOGRAM FOR BREAST CANCER: ICD-10-CM

## 2025-05-28 DIAGNOSIS — Z00.00 ANNUAL PHYSICAL EXAM: Primary | ICD-10-CM

## 2025-05-28 DIAGNOSIS — Z13.29 SCREENING FOR THYROID DISORDER: ICD-10-CM

## 2025-05-28 DIAGNOSIS — R03.0 ELEVATED SYSTOLIC BLOOD PRESSURE READING WITHOUT DIAGNOSIS OF HYPERTENSION: ICD-10-CM

## 2025-05-28 DIAGNOSIS — M72.2 PLANTAR FASCIAL FIBROMATOSIS: ICD-10-CM

## 2025-05-28 DIAGNOSIS — M25.50 ARTHRALGIA, UNSPECIFIED JOINT: ICD-10-CM

## 2025-05-28 DIAGNOSIS — Z13.21 ENCOUNTER FOR VITAMIN DEFICIENCY SCREENING: ICD-10-CM

## 2025-05-28 DIAGNOSIS — F33.9 RECURRENT MAJOR DEPRESSIVE DISORDER, REMISSION STATUS UNSPECIFIED (HCC): ICD-10-CM

## 2025-05-28 DIAGNOSIS — Z13.1 SCREENING FOR DIABETES MELLITUS: ICD-10-CM

## 2025-05-28 DIAGNOSIS — Z85.038 PERSONAL HISTORY OF COLON CANCER: ICD-10-CM

## 2025-05-28 DIAGNOSIS — Z13.6 SCREENING FOR CARDIOVASCULAR CONDITION: ICD-10-CM

## 2025-05-28 PROBLEM — F33.1 MAJOR DEPRESSIVE DISORDER, RECURRENT, MODERATE (HCC): Status: ACTIVE | Noted: 2023-11-06

## 2025-05-28 PROBLEM — F32.5 MAJOR DEPRESSION IN REMISSION (HCC): Status: ACTIVE | Noted: 2023-11-06

## 2025-05-28 PROBLEM — K58.9 IRRITABLE BOWEL SYNDROME: Status: ACTIVE | Noted: 2024-09-22

## 2025-05-28 PROBLEM — F43.10 POST-TRAUMATIC STRESS DISORDER, UNSPECIFIED: Status: ACTIVE | Noted: 2023-11-06

## 2025-05-28 PROBLEM — Z08 ENCOUNTER FOR FOLLOW-UP SURVEILLANCE OF COLON CANCER: Status: RESOLVED | Noted: 2023-12-29 | Resolved: 2025-05-28

## 2025-05-28 PROBLEM — K59.1 DIARRHEA, FUNCTIONAL: Status: RESOLVED | Noted: 2024-07-11 | Resolved: 2025-05-28

## 2025-05-28 PROCEDURE — 99386 PREV VISIT NEW AGE 40-64: CPT | Performed by: FAMILY MEDICINE

## 2025-05-28 PROCEDURE — 99213 OFFICE O/P EST LOW 20 MIN: CPT | Performed by: FAMILY MEDICINE

## 2025-05-28 NOTE — ASSESSMENT & PLAN NOTE
"S/p hemicolectomy and chemo,   She follows with hematology at [facility]   Per hematology last office note, \"Restaging CT Chest/abdomen/pelvis (CAP) scans will be planned q6 months for 5 years. H&P q3-6 months in the first 2 years and then q6 months thereafter until year 5. CEA q6 months. C-scope 12 months after surgery (3-6 months if there was a pre-obstructing lesion prior to surgery not allowing c-scope to be done) \"  Scheduled for CT scan tomorrow         "

## 2025-05-28 NOTE — PATIENT INSTRUCTIONS
"Patient Education     Routine physical for adults   The Basics   Written by the doctors and editors at [facility]   What is a physical? -- A physical is a routine visit, or \"check-up,\" with your doctor. You might also hear it called a \"wellness visit\" or \"preventive visit.\"  During each visit, the doctor will:   Ask about your physical and mental health   Ask about your habits, behaviors, and lifestyle   Do an exam   Give you vaccines if needed   Talk to you about any medicines you take   Give advice about your health   Answer your questions  Getting regular check-ups is an important part of taking care of your health. It can help your doctor find and treat any problems you have. But it's also important for preventing health problems.  A routine physical is different from a \"sick visit.\" A sick visit is when you see a doctor because of a health concern or problem. Since physicals are scheduled ahead of time, you can think about what you want to ask the doctor.  How often should I get a physical? -- It depends on your age and health. In general, for people age 21 years and older:   If you are younger than 50 years, you might be able to get a physical every 3 years.   If you are 50 years or older, your doctor might recommend a physical every year.  If you have an ongoing health condition, like diabetes or high blood pressure, your doctor will probably want to see you more often.  What happens during a physical? -- In general, each visit will include:   Physical exam - The doctor or nurse will check your height, weight, heart rate, and blood pressure. They will also look at your eyes and ears. They will ask about how you are feeling and whether you have any symptoms that bother you.   Medicines - It's a good idea to bring a list of all the medicines you take to each doctor visit. Your doctor will talk to you about your medicines and answer any questions. Tell them if you are having any side effects that bother you. You " "should also tell them if you are having trouble paying for any of your medicines.   Habits and behaviors - This includes:   Your diet   Your exercise habits   Whether you smoke, drink alcohol, or use drugs   Whether you are sexually active   Whether you feel safe at home  Your doctor will talk to you about things you can do to improve your health and lower your risk of health problems. They will also offer help and support. For example, if you want to quit smoking, they can give you advice and might prescribe medicines. If you want to improve your diet or get more physical activity, they can help you with this, too.   Lab tests, if needed - The tests you get will depend on your age and situation. For example, your doctor might want to check your:   Cholesterol   Blood sugar   Iron level   Vaccines - The recommended vaccines will depend on your age, health, and what vaccines you already had. Vaccines are very important because they can prevent certain serious or deadly infections.   Discussion of screening - \"Screening\" means checking for diseases or other health problems before they cause symptoms. Your doctor can recommend screening based on your age, risk, and preferences. This might include tests to check for:   Cancer, such as breast, prostate, cervical, ovarian, colorectal, prostate, lung, or skin cancer   Sexually transmitted infections, such as chlamydia and gonorrhea   Mental health conditions like depression and anxiety  Your doctor will talk to you about the different types of screening tests. They can help you decide which screenings to have. They can also explain what the results might mean.   Answering questions - The physical is a good time to ask the doctor or nurse questions about your health. If needed, they can refer you to other doctors or specialists, too.  Adults older than 65 years often need other care, too. As you get older, your doctor will talk to you about:   How to prevent falling at " home   Hearing or vision tests   Memory testing   How to take your medicines safely   Making sure that you have the help and support you need at home  All topics are updated as new evidence becomes available and our peer review process is complete.  This topic retrieved from [employer] on: May 02, 2024.  Topic 365803 Version 1.0  Release: 32.4.3 - C32.122  © 2024 UpToDate, Inc. and/or its affiliates. All rights reserved.  Consumer Information Use and Disclaimer   Disclaimer: This generalized information is a limited summary of diagnosis, treatment, and/or medication information. It is not meant to be comprehensive and should be used as a tool to help the user understand and/or assess potential diagnostic and treatment options. It does NOT include all information about conditions, treatments, medications, side effects, or risks that may apply to a specific patient. It is not intended to be medical advice or a substitute for the medical advice, diagnosis, or treatment of a health care provider based on the health care provider's examination and assessment of a patient's specific and unique circumstances. Patients must speak with a health care provider for complete information about their health, medical questions, and treatment options, including any risks or benefits regarding use of medications. This information does not endorse any treatments or medications as safe, effective, or approved for treating a specific patient. UpToDate, Inc. and its affiliates disclaim any warranty or liability relating to this information or the use thereof.The use of this information is governed by the Terms of Use, available at https://www.woltersOneTwoSeeuwer.com/en/know/clinical-effectiveness-terms. 2024© UpToDate, Inc. and its affiliates and/or licensors. All rights reserved.  Copyright   © 2024 UpToDate, Inc. and/or its affiliates. All rights reserved.

## 2025-05-28 NOTE — PROGRESS NOTES
"Adult Annual Physical  Name: Irma Gee      : 1970      MRN: [id number]  Encounter Provider: Pratibha Eckert DO  Encounter Date: 2025   Encounter department: [facility]    :  Assessment & Plan  Annual physical exam  Discussed diet and exercise.   Screening labs ordered  Immunizations up to date  Due for mammogram  Up to date on cervical cancer screening.        Personal history of colon cancer  S/p hemicolectomy and chemo,   She follows with hematology at [facility]   Per hematology last office note, \"Restaging CT Chest/abdomen/pelvis (CAP) scans will be planned q6 months for 5 years. H&P q3-6 months in the first 2 years and then q6 months thereafter until year 5. CEA q6 months. C-scope 12 months after surgery (3-6 months if there was a pre-obstructing lesion prior to surgery not allowing c-scope to be done) \"  Scheduled for CT scan tomorrow         Plantar fascial fibromatosis  Comes and goes. Has orthotic and wears brace when she sleeps.   Doing well right now.          Primary osteoarthritis of left hip  Seeing ortho for this.   In middle of PT right now         Recurrent major depressive disorder, remission status unspecified (HCC)  [facility] and does not want to discuss         Screening for diabetes mellitus    Orders:    Hemoglobin A1C    Screening for thyroid disorder    Orders:    TSH, 3rd generation with Free T4 reflex    Encounter for vitamin deficiency screening    Orders:    Vitamin D 25 hydroxy    Arthralgia, unspecified joint  Check crp, ir[place]a, anti ccp and lyme  No synovitis on exam.   Orders:    C-reactive protein    IRLANDA Screen w/Reflex Cascade    Cyclic citrul peptide antibody, IgG    Lyme Total AB W Reflex to IGM/IGG    Screening for cardiovascular condition    Orders:    Lipid panel    Encounter for screening mammogram for breast cancer    Orders:    Mammo screening bilateral w 3d and cad; Future    Elevated systolic blood pressure " "reading without diagnosis of hypertension  Suspect that bp elevation is related to stress as she lost car keys this AM and had to uber to today's visit.   Will monitor bp in ambulatory setting  DASH diet.   Recheck 6 months.        Annual physical exam               Preventive Screenings:    - Cervical cancer screening: screening up-to-date     Immunizations:  - Immunizations due: Zoster (Shingrix)         History of Present Illness     Adult Annual Physical:  Patient presents for annual physical.     Diet and Physical Activity:  - Diet/Nutrition: no special diet.  - Exercise:. lifts and does some HIT workouts    Depression Screening:  - PHQ-2 Score: 2    General Health:  - Sleep: sleeps well.  - Hearing: tinnitus and normal hearing bilateral ears.  - Vision: no vision problems, wears glasses and most recent eye exam > 1 year ago.  - Dental: regular dental visits.    /GYN Health:  - Follows with GYN: yes.   - Menopause: postmenopausal.     Patient is a 54 year old female with history of colon cancer s/p hemicolectomy who is being seen today as a new patient for annual physical.   Previous PCP = [facility] in [place].   Mammogram is overdue  Cervical cancer screening is up to date (5/1/25)  History of colon cancer for which she had hemicolectomy and chemo  Follows regularly with hematology and is scheduled for CT scan tomorrow.   She is retired  and [facility].     Only other concern today is some pain in her body. Joints, bones. All over. No swelling of joints. Different than the paresthesias that she had mentioned to previous PCP at last physical.   Hard for her to describe.     Wants her vitamin D level checked. Has been low in past.     Review of Systems  Medical History Reviewed by provider this encounter:  Tobacco  Allergies  Meds  Problems  Med Hx  Surg Hx  Fam Hx  Soc   Hx    .  Medications Ordered Prior to Encounter[1]     Objective   /77   Pulse 70   Ht 5' 7\" (1.702 " m)   Wt 70.5 kg (155 lb 6.4 oz)   SpO2 98%   BMI 24.34 kg/m²     Physical Exam  Vitals and nursing note reviewed.   Constitutional:       General: She is not in acute distress.     Appearance: Normal appearance. She is not ill-appearing, toxic-appearing or diaphoretic.   HENT:      Head: Normocephalic and atraumatic.      Right Ear: Tympanic membrane normal.      Left Ear: Tympanic membrane normal.      Nose: Nose normal.      Mouth/Throat:      Mouth: Mucous membranes are moist.      Pharynx: No posterior oropharyngeal erythema.     Eyes:      Conjunctiva/sclera: Conjunctivae normal.       Cardiovascular:      Rate and Rhythm: Normal rate and regular rhythm.      Heart sounds: No murmur heard.  Pulmonary:      Effort: Pulmonary effort is normal.      Breath sounds: Normal breath sounds.   Abdominal:      General: There is no distension.      Palpations: Abdomen is soft. There is no mass.      Tenderness: There is no abdominal tenderness.     Musculoskeletal:         General: No swelling, tenderness or deformity. Normal range of motion.      Right lower leg: No edema.      Left lower leg: No edema.     Skin:     Findings: No rash.     Neurological:      General: No focal deficit present.      Mental Status: She is alert and oriented to person, place, and time.      Deep Tendon Reflexes: Reflexes normal.     Psychiatric:      Comments: anxious              [1]   Current Outpatient Medications on File Prior to Visit   Medication Sig Dispense Refill    Levonorgestrel (MIRENA) 20 MCG/DAY IUD 1 each by Intrauterine route once      [DISCONTINUED] celecoxib (CeleBREX) 200 mg capsule Take 1 capsule (200 mg total) by mouth daily for 7 days (Patient not taking: Reported on 7/6/2023) 7 capsule 0    [DISCONTINUED] cholecalciferol (VITAMIN D3) 1,000 units tablet Take 2,000 Units by mouth daily (Patient not taking: Reported on 5/7/2025)      [DISCONTINUED] enoxaparin (Lovenox) 40 mg/0.4 mL Inject 0.4 mL (40 mg total) under the  skin in the morning for 25 days (Patient not taking: Reported on 8/7/2023) 10 mL 0    [DISCONTINUED] gabapentin (NEURONTIN) 300 mg capsule Take 1 capsule (300 mg total) by mouth 3 (three) times a day for 7 days (Patient not taking: Reported on 7/6/2023) 21 capsule 0    [DISCONTINUED] OLANZapine (ZyPREXA) 5 mg tablet take 1 tablet by mouth at bedtime (Patient not taking: Reported on 12/13/2023) 30 tablet 0    [DISCONTINUED] prochlorperazine (COMPAZINE) 10 mg tablet Take 1 tablet (10 mg total) by mouth every 8 (eight) hours as needed for nausea or vomiting (Patient not taking: Reported on 12/13/2023) 30 tablet 0     No current facility-administered medications on file prior to visit.

## 2025-05-29 ENCOUNTER — APPOINTMENT (OUTPATIENT)
Dept: LAB | Facility: HOSPITAL | Age: 55
End: 2025-05-29
Payer: COMMERCIAL

## 2025-05-29 ENCOUNTER — RESULTS FOLLOW-UP (OUTPATIENT)
Dept: FAMILY MEDICINE CLINIC | Facility: CLINIC | Age: 55
End: 2025-05-29

## 2025-05-29 DIAGNOSIS — Z00.00 UNSPECIFIED GENERAL MEDICAL EXAMINATION: ICD-10-CM

## 2025-05-29 DIAGNOSIS — Z85.038 PERSONAL HISTORY OF COLON CANCER: ICD-10-CM

## 2025-05-29 LAB
25(OH)D3 SERPL-MCNC: 57.7 NG/ML (ref 30–100)
ALBUMIN SERPL BCG-MCNC: 4.3 G/DL (ref 3.5–5)
ALP SERPL-CCNC: 75 U/L (ref 34–104)
ALT SERPL W P-5'-P-CCNC: 14 U/L (ref 7–52)
ANION GAP SERPL CALCULATED.3IONS-SCNC: 10 MMOL/L (ref 4–13)
AST SERPL W P-5'-P-CCNC: 20 U/L (ref 13–39)
B BURGDOR IGG+IGM SER QL IA: NEGATIVE
BASOPHILS # BLD AUTO: 0.03 THOUSANDS/ÂΜL (ref 0–0.1)
BASOPHILS NFR BLD AUTO: 1 % (ref 0–1)
BILIRUB SERPL-MCNC: 0.79 MG/DL (ref 0.2–1)
BUN SERPL-MCNC: 27 MG/DL (ref 5–25)
CALCIUM SERPL-MCNC: 9.2 MG/DL (ref 8.4–10.2)
CEA SERPL-MCNC: 1.1 NG/ML (ref 0–3)
CHLORIDE SERPL-SCNC: 100 MMOL/L (ref 96–108)
CHOLEST SERPL-MCNC: 185 MG/DL (ref ?–200)
CO2 SERPL-SCNC: 29 MMOL/L (ref 21–32)
CREAT SERPL-MCNC: 0.95 MG/DL (ref 0.6–1.3)
CRP SERPL QL: 1.6 MG/L
EOSINOPHIL # BLD AUTO: 0.16 THOUSAND/ÂΜL (ref 0–0.61)
EOSINOPHIL NFR BLD AUTO: 3 % (ref 0–6)
ERYTHROCYTE [DISTWIDTH] IN BLOOD BY AUTOMATED COUNT: 12.1 % (ref 11.6–15.1)
EST. AVERAGE GLUCOSE BLD GHB EST-MCNC: 108 MG/DL
GFR SERPL CREATININE-BSD FRML MDRD: 67 ML/MIN/1.73SQ M
GLUCOSE P FAST SERPL-MCNC: 93 MG/DL (ref 65–99)
HBA1C MFR BLD: 5.4 %
HCT VFR BLD AUTO: 39.8 % (ref 34.8–46.1)
HDLC SERPL-MCNC: 63 MG/DL
HGB BLD-MCNC: 12.6 G/DL (ref 11.5–15.4)
IMM GRANULOCYTES # BLD AUTO: 0.01 THOUSAND/UL (ref 0–0.2)
IMM GRANULOCYTES NFR BLD AUTO: 0 % (ref 0–2)
LDLC SERPL CALC-MCNC: 109 MG/DL (ref 0–100)
LYMPHOCYTES # BLD AUTO: 2.01 THOUSANDS/ÂΜL (ref 0.6–4.47)
LYMPHOCYTES NFR BLD AUTO: 36 % (ref 14–44)
MCH RBC QN AUTO: 30.4 PG (ref 26.8–34.3)
MCHC RBC AUTO-ENTMCNC: 31.7 G/DL (ref 31.4–37.4)
MCV RBC AUTO: 96 FL (ref 82–98)
MONOCYTES # BLD AUTO: 0.44 THOUSAND/ÂΜL (ref 0.17–1.22)
MONOCYTES NFR BLD AUTO: 8 % (ref 4–12)
NEUTROPHILS # BLD AUTO: 2.88 THOUSANDS/ÂΜL (ref 1.85–7.62)
NEUTS SEG NFR BLD AUTO: 52 % (ref 43–75)
NONHDLC SERPL-MCNC: 122 MG/DL
NRBC BLD AUTO-RTO: 0 /100 WBCS
PLATELET # BLD AUTO: 215 THOUSANDS/UL (ref 149–390)
PMV BLD AUTO: 11.4 FL (ref 8.9–12.7)
POTASSIUM SERPL-SCNC: 4 MMOL/L (ref 3.5–5.3)
PROT SERPL-MCNC: 7 G/DL (ref 6.4–8.4)
RBC # BLD AUTO: 4.15 MILLION/UL (ref 3.81–5.12)
SODIUM SERPL-SCNC: 139 MMOL/L (ref 135–147)
TRIGL SERPL-MCNC: 65 MG/DL (ref ?–150)
TSH SERPL DL<=0.05 MIU/L-ACNC: 2.67 UIU/ML (ref 0.45–4.5)
WBC # BLD AUTO: 5.53 THOUSAND/UL (ref 4.31–10.16)

## 2025-05-29 PROCEDURE — 84443 ASSAY THYROID STIM HORMONE: CPT | Performed by: FAMILY MEDICINE

## 2025-05-29 PROCEDURE — 85025 COMPLETE CBC W/AUTO DIFF WBC: CPT

## 2025-05-29 PROCEDURE — 86225 DNA ANTIBODY NATIVE: CPT | Performed by: FAMILY MEDICINE

## 2025-05-29 PROCEDURE — 86038 ANTINUCLEAR ANTIBODIES: CPT | Performed by: FAMILY MEDICINE

## 2025-05-29 PROCEDURE — 36415 COLL VENOUS BLD VENIPUNCTURE: CPT | Performed by: FAMILY MEDICINE

## 2025-05-29 PROCEDURE — 82306 VITAMIN D 25 HYDROXY: CPT | Performed by: FAMILY MEDICINE

## 2025-05-29 PROCEDURE — 83036 HEMOGLOBIN GLYCOSYLATED A1C: CPT | Performed by: FAMILY MEDICINE

## 2025-05-29 PROCEDURE — 86618 LYME DISEASE ANTIBODY: CPT | Performed by: FAMILY MEDICINE

## 2025-05-29 PROCEDURE — 80053 COMPREHEN METABOLIC PANEL: CPT

## 2025-05-29 PROCEDURE — 82378 CARCINOEMBRYONIC ANTIGEN: CPT

## 2025-05-29 PROCEDURE — 80061 LIPID PANEL: CPT | Performed by: FAMILY MEDICINE

## 2025-05-29 PROCEDURE — 86140 C-REACTIVE PROTEIN: CPT | Performed by: FAMILY MEDICINE

## 2025-05-29 PROCEDURE — 86200 CCP ANTIBODY: CPT | Performed by: FAMILY MEDICINE

## 2025-05-30 ENCOUNTER — OFFICE VISIT (OUTPATIENT)
Dept: PHYSICAL THERAPY | Facility: CLINIC | Age: 55
End: 2025-05-30
Attending: ORTHOPAEDIC SURGERY
Payer: COMMERCIAL

## 2025-05-30 DIAGNOSIS — M25.552 LEFT HIP PAIN: Primary | ICD-10-CM

## 2025-05-30 PROCEDURE — 97110 THERAPEUTIC EXERCISES: CPT | Performed by: PHYSICAL THERAPIST

## 2025-05-30 PROCEDURE — 97140 MANUAL THERAPY 1/> REGIONS: CPT | Performed by: PHYSICAL THERAPIST

## 2025-05-30 NOTE — PROGRESS NOTES
"Daily Note     Today's date: 2025  Patient name: Irma Gee  : 1970  MRN: [id number]  Referring provider: Carl Pat MD  Dx:   Encounter Diagnosis     ICD-10-CM    1. Left hip pain  M25.552                      Subjective: Notes improved mobility, still soreness at her hip.  Denies major numbness or tingling.  Happy with progress to date.       Objective: See treatment diary below      Assessment: Noting increased frontal plane instability with lateral step down performance from pelvis and valgus collapse.  Wth mirror feedback and repeated gluteal activation with stance and OKC hip ABD able to recruit with improved ability with lat step down performance.  Continued weakness with SL hip ABD.  Will continue to benefit from hip stability as able. Continued LLD with supine to sit improved with anterior manual innominant mobilization.       Plan: Continue per plan of care.  Progress with hip stabilization     Daily Treatment Diary     Precautions: none  CO-MORBIDITIES: Colon cancer history 1 year  TO MD On: 25  FOTO Completed On: 25    POC Expires Reeval for Medicare to be completed  Unit Limit Auth Expiration Date PT/OT/STVisit Limit   25 By visit NA NA 25 50    Completed on visit                    Auth Status DATE       Approved Visit # 1 2 3       Remaining 49 [phone]      MANUAL THERAPY         L hip PA mobilization  5 min        SIJ assess andanterior MET  3 min  5 min       Prone quad stretch and ER  10 min        Test and re-test  10 min       Anterior L innominant rotation Gr 3-4   3 min                THERAPEUTIC EXERCISE HEP         1/2 kneel stretch 10x  10x3\" with ER Roberto 10/3\"      Prone hip ext 10x         STD ext 10x  2x10 with OP GTB 30x      Quad rock with ER   10x3\"       Squat   10x 2x10      Curtsy squat   10xea  2x10      Wlaking lunge   3x20 ft 3x20 ft      Side stepping   5 laps 10 ft GTB 5 laps 10 ft GTB      Piriformis stretch seated   " "3x30\" 3x30\" ea       Lat step down    8\" 20x                                                        NEUROMUSCULAR REEDUCATION                                                                                                                                                       THERAPEUTIC ACTIVITY                                                  GAIT TRAINING                                                  MODALITIES                                          "

## 2025-06-01 LAB
CCP AB SER IA-ACNC: 1.1 (ref ?–10)
DSDNA IGG SERPL IA-ACNC: <0.9 IU/ML (ref ?–15)
NUCLEAR IGG SER IA-RTO: <0.09 RATIO (ref ?–1)

## 2025-06-02 ENCOUNTER — APPOINTMENT (OUTPATIENT)
Dept: PHYSICAL THERAPY | Facility: CLINIC | Age: 55
End: 2025-06-02
Attending: ORTHOPAEDIC SURGERY
Payer: COMMERCIAL

## 2025-06-05 ENCOUNTER — HOSPITAL ENCOUNTER (OUTPATIENT)
Dept: CT IMAGING | Facility: HOSPITAL | Age: 55
Discharge: HOME | End: 2025-06-05
Attending: INTERNAL MEDICINE
Payer: COMMERCIAL

## 2025-06-05 DIAGNOSIS — Z85.038 PERSONAL HISTORY OF COLON CANCER: ICD-10-CM

## 2025-06-05 PROCEDURE — 71260 CT THORAX DX C+: CPT

## 2025-06-05 PROCEDURE — 74177 CT ABD & PELVIS W/CONTRAST: CPT

## 2025-06-05 RX ADMIN — IOHEXOL 50 ML: 350 INJECTION, SOLUTION INTRAVENOUS at 06:59

## 2025-06-10 ENCOUNTER — OFFICE VISIT (OUTPATIENT)
Dept: PHYSICAL THERAPY | Facility: CLINIC | Age: 55
End: 2025-06-10
Attending: ORTHOPAEDIC SURGERY
Payer: COMMERCIAL

## 2025-06-10 DIAGNOSIS — M25.552 LEFT HIP PAIN: Primary | ICD-10-CM

## 2025-06-10 PROCEDURE — 97112 NEUROMUSCULAR REEDUCATION: CPT | Performed by: PHYSICAL THERAPIST

## 2025-06-10 PROCEDURE — 97140 MANUAL THERAPY 1/> REGIONS: CPT | Performed by: PHYSICAL THERAPIST

## 2025-06-10 PROCEDURE — 97110 THERAPEUTIC EXERCISES: CPT | Performed by: PHYSICAL THERAPIST

## 2025-06-10 NOTE — PROGRESS NOTES
"Daily Note     Today's date: 6/10/2025  Patient name: Irma Gee  : 1970  MRN: [id number]  Referring provider: Carl Pat MD  Dx:   Encounter Diagnosis     ICD-10-CM    1. Left hip pain  M25.552                      Subjective: Having continued burning discomfort at anterior hip.  At rest having aching as well and getting frustrated with progress- was able to perform a tough mudder race this past weekend but having stiffness and pain with jogging.       Objective: See treatment diary below      Assessment: Most pain with illiopsoas activaiton- less pain with deep pressure to femoral triagle and long axis rolling with release.  Continues to have pain with performance of L hip flexion and heel sliding.  Trial of BFR for pain-relief and activation.  Less pain with SLS and stance static post.  Will continue to work on release of anterior hip flexors.       Plan: Continue per plan of care.      Daily Treatment Diary     Precautions: none  CO-MORBIDITIES: Colon cancer history 1 year  TO MD On: 25  FOTO Completed On: 25    POC Expires Reeval for Medicare to be completed  Unit Limit Auth Expiration Date PT/OT/STVisit Limit   25 By visit NA NA 25 50    Completed on visit                    Auth Status DATE 5/19 5/23 5/30 6/10     Approved Visit # 1 2 3 4      Remaining [phone]     MANUAL THERAPY         L hip PA mobilization  5 min   5 min      SIJ assess andanterior MET  3 min  5 min       Prone quad stretch and ER  10 min        Test and re-test  10 min  10 min      Anterior L innominant rotation Gr 3-4   3 min                THERAPEUTIC EXERCISE HEP         1/2 kneel stretch 10x  10x3\" with ER Roberto 10/3\" 10x3\"     Prone hip ext 10x         STD ext 10x  2x10 with OP GTB 30x      Quad rock with ER   10x3\"       Squat   10x 2x10      Curtsy squat   10xea  2x10      Wlaking lunge   3x20 ft 3x20 ft      Side stepping   5 laps 10 ft GTB 5 laps 10 ft GTB      Piriformis stretch seated   " "3x30\" 3x30\" ea       Lat step down    8\" 20x      Heel slide     BFR 75 reps      SLR     BFR 75 reps     STD march     BFR 75 reps                         NEUROMUSCULAR REEDUCATION           PNE on pain-control     15 min                                                                                                                                        THERAPEUTIC ACTIVITY                                                  GAIT TRAINING                                                  MODALITIES                                            "

## 2025-06-11 PROBLEM — C18.8 OVERLAPPING MALIGNANT NEOPLASM OF COLON (HCC): Chronic | Status: ACTIVE | Noted: 2023-06-08

## 2025-06-11 NOTE — PROGRESS NOTES
"Name: Irma Gee      : 1970      MRN: [id number]  Encounter Provider: Hortencia Montague MD  Encounter Date: 2025   Encounter department: [facility]  :  Assessment & Plan  Overlapping malignant neoplasm of colon (HCC)    This is a 54 y.o. c PMHx notable for sinus congestion, being seen in consultation for early stage colon adenocarcinoma on surveillance  Orders:    CEA; Future    Comprehensive metabolic panel; Future    CBC and differential; Future    CT chest abdomen pelvis w contrast; Future      Assessment & Plan        Return in about 6 months (around 2025) for Office Visit, Imaging - See orders.    History of Present Illness   Chief Complaint   Patient presents with    Follow-up     History of Present Illness          Objective   /80 (BP Location: Left arm, Patient Position: Sitting, Cuff Size: Adult)   Pulse 68   Temp 97.5 °F (36.4 °C) (Skin)   Resp 16   Ht 5' 7\" (1.702 m)   Wt 68.9 kg (152 lb)   SpO2 97%   BMI 23.81 kg/m²         Cancer Pathologic Stage at diagnosis: IIIA    Referral Physician: No ref. provider found    Primary Care Physician:  Pratibha Eckert DO     Nickname: Irma    Spouse: Song de león    Original ECO     Today's ECO    Goals and Barriers:  Current Goal: Minimize effects of disease burden, extend life.   Barriers to accomplishing this: None    Patient's Capacity to Self Care:  Patient is able to self care        This is a 54 y.o. c PMHx notable for sinus congestion,L hip arthritis, being seen in consultation for early stage colon adenocarcinoma on surveillance      Discussion of decision making  Oncology history updated, accordingly, during this visit  Goals of care/patient communication  I discussed with the patient the clinical course leading up to their cancer diagnosis. I reviewed relevant office notes, imaging reports and pathology result as well.  I told the patient that this is a case " of curable disease and what this means. We discussed that the goal of anti-cancer therapy is to provide best quality of life, extend overall survival, and progression free survival as shown in clinical trials. We also discussed that there might be a point when the cancer will no longer respond to this anti-neoplastic therapy.   I explained the risks/benefits of the proposed cancer therapy: CAPOX for 3 months and after discussion including understanding risks of possible life-threatening complications and therapy-related malignancy development, informed consent for blood products and treatment has been signed and obtained.  TNM/Staging At Diagnosis  Cancer Staging  Colon adenocarcinoma (HCC)  Staging form: Colon and Rectum, AJCC 8th Edition  - Pathologic: Stage IIIA (pT1, pN1, cM0) - Signed by Ileana Locke MD on 7/6/2023  Disease Features/Tumor Markers/Genetics  Tumor Marker: CEA   7/21/2023 0.8  12/4/2023: 1.4  1/5/2024: signatera negative  3/31/2024: signatera negative  5/31/2024: CEA 0.8  11/26/2024: CEA 0.9  Notable Path Features: MMR-proficient, 1/19 LN involved  Treatment: CAPOX  Other Supportive care:   Treatment Team Members  Surgeon: Dr. Locke  Rad Onc  Palliative  Labs: 7/21/2023: Hgb 13.3, WBC 5k, plt 240k  Diagnostics  6/11/2023 CT CAP w/c: No evidence for metastatic disease in the chest, abdomen, or pelvis  7/28/2023: signatera negative  9/1/2023 signatera negative  11/6/2023 signatera: negative  12/11/2023 CT CAP w/c: No evidence of metastatic disease in the chest, abdomen, or pelvis.  6/3/2024 CT CAP w/c: No new findings to suggest metastatic disease in the chest, abdomen, or pelvis   12/2/2024 CT CAP w/c: No recurrent or metastatic disease. No pulmonary lesions. No abdominal or pelvic lymphadenopathy. Partial sigmoid resection with patent anastomosis. No evidence of hepatic metastatic disease  6/5/2025 CT CAP w/c: No recurrent or metastatic disease. No significant adenopathy. No suspicious  pulmonary nodule. No new liver lesions are identified          This is a patient with Stage III L sided colon cancer with only one high risk features (LN). The patient is MMR-proficient.     From an overall performance status basis, I believe the patient can tolerate systemic treatment.    Prognosis is more hopeful as we are dealing with an originating left sided.    Discussion of disease response monitoring:  We discussed with the patient at length the role of imaging and potential blood monitoring of burden of disease. In addition, we discussed at length the role of increasingly recognized peripheral blood monitoring of disease burden.  Cell-free Circulating Tumor DNA Variant Allele Frequency has been associated with survival in metastatic cancer, albeit mainly determined in retrospective studies to date (https://pubmed.ncbi.nlm.nih.gov/99506432/). The patient had all of their questions and concerns answered and we will elect to pursue this.      Restaging CT Chest/abdomen/pelvis (CAP) scans will be planned q6 months for 5 years. H&P q3-6 months in the first 2 years and then q6 months thereafter until year 5. CEA q6 months. C-scope 12 months after surgery (3-6 months if there was a pre-obstructing lesion prior to surgery not allowing c-scope to be done)      Discussion of decision making    I personally reviewed the following lab results, the image studies, pathology, other specialty/physicians consult notes and recommendations, and outside medical records. I had a lengthy discussion with the patient and shared the work-up findings. We discussed the diagnosis and management plan as below. I spent 32 minutes reviewing the records (labs, clinician notes, outside records, medical history, ordering medicine/tests/procedures, interpreting the imaging/labs previously done) and coordination of care as well as direct time with the patient today, of which greater than 50% of the time was spent in counseling and  coordination of care with the patient/family.    Plan/Labs  Restaging CT CAP ordered in 6 months along with preceding CEA, CMP, then yearly**  Signatera surveillance (renewed orders for next 6 months)  F/u Surg-Onc for post surgery care, due for 3 year C-scope 5/2027        Follow Up: 6 months    All questions were answered to the patient's satisfaction during this encounter. The patient knows the contact information for our office and knows to reach out for any relevant concerns related to this encounter. They are to call for any temperature 100.4 or higher, new symptoms including but not restricted to shaking chills, decreased appetite, nausea, vomiting, diarrhea, increased fatigue, shortness of breath or chest pain, confusion, and not feeling the strength to come to the clinic. For all other listed problems and medical diagnosis in their chart - they are managed by PCP and/or other specialists, which the patient acknowledges. Thank you very much for your consultation and making us a part of this patient's care. We are continuing to follow closely with you. Please do not hesitate to reach out to me with any additional questions or concerns.    Hortencia Montague MD  Hematology & Medical Oncology Staff Physician             Disclaimer: This document was prepared using [employer] Direct technology. If a word or phrase is confusing, or does not make sense, this is likely due to recognition error which was not discovered during this clinician's review. If you believe an error has occurred, please contact me through [facility] service line for elias?cation.      ONCOLOGY HISTORY OF PRESENT ILLNESS        Oncology History   Overlapping malignant neoplasm of colon (HCC)   5/23/2023 Biopsy    A. Transverse colon polyp cold snare, polypectomy:  -   Fragments of sessile serrated adenoma (deeper levels examined).     -   Separate scant fragments of tubular adenoma, likely tissue contaminant.       B. Sigmoid colon polyp  hot snare, polypectomy:  -   Invasive adenocarcinoma, moderately differentiated, arising in fragments of traditional serrated adenoma.  -   MMR (MLH1, MSH2, MSH6 and PMS2) studies by immunohistochemistry on B2 are pending.  -   See comment and synoptic report.     6/8/2023 Initial Diagnosis    Colon adenocarcinoma (HCC)     6/23/2023 Surgery    A. Sigmoid colon, sigmoidectomy:  -   Sigmoid colon with tattoo pigment and multinucleated giant cell reaction.  -   Negative for residual adenoma and carcinoma.   -   Resection margins are negative for adenoma and carcinoma.   -   Metastatic adenocarcinoma involving one of 19 lymph nodes (1/19, pN1a).     7/6/2023 -  Cancer Staged    Staging form: Colon and Rectum, AJCC 8th Edition  - Pathologic: Stage IIIA (pT1, pN1, cM0) - Signed by Ileana Locke MD on 7/6/2023 7/31/2023 - 8/21/2023 Chemotherapy    alteplase (CATHFLO), 2 mg, Intracatheter, Every 1 Minute as needed, 2 of 4 cycles  oxaliplatin (ELOXATIN) chemo infusion, 245.7 mg (135 % of original dose 100 mg/m2), Intravenous, Once, 2 of 4 cycles  Dose modification: 135 mg/m2 (original dose 100 mg/m2, Cycle 1, Reason: Dose modified as per discussion with consulting physician)  Administration: 250 mg (7/31/2023), 250 mg (8/21/2023)       Status post robotic sigmoidectomy [6/26/2023-Dr. Locke].  Mismatch repair protein intact. Her cancer was identified by routine colonoscopy [3/24/23].  Results of the colonoscopy with biopsy of one of the 2 polyps (sigmoid) that were removed showed invasive adenocarcinoma, moderately differentiated, arising in fragments of traditional serrated adenoma.  Patient underwent subsequent sigmoid colectomy which showed metastatic adenocarcinoma involving one of 19 lymph nodes (1/19, pN1a).   Patient denies any complications.  Denies any blood in stool or urine    CAPOX (Capecitabine 1000 mg/m2) was given q3 weeks for 2 cycles, and then discontinued oxaliplatin as she had 2 negative Signatera  ct DNA and she decided to not pursue any further Oxaliplatin as a result due to neuropathy concerns    SUBJECTIVE  (INTERVAL HISTORY)      Clotting History None   Bleeding History None   Cancer History Colon   Family Cancer History pGrandfather (colon), mGrandmother (breast), mAunt (breast), mAunt (uterine)   H/O Blood/Plt Transfusion None   Tobacco/etoh/drug abuse 15-pack-year smoking history [quit 2012]    Hx COVID19 Infection and Vaccine Status January 2022 COVID, vaccinated    Cancer Screening history up-to-date,  Next colonoscopy due 6/2024   Occupation retired, was working for [employer]     9/11/2023: C3 Xeloda  Week of 10/2/2023: C4 Xeloda    Gets sinus infections/URI q3 months since her cancer treatments.    Doing very well overall.    Doing her occasional jogging and doing lifting.     I have reviewed the relevant past medical, surgical, social and family history. I have also reviewed allergies and medications for this patient.    Review of Systems    Baseline weight: 155 lbs    Denies F/C, N/V, SOB, CP, LH, HA, rash, itching, gen weakness, melena, hematuria, hematochezia, falls, diarrhea, or constipation       A 10-point review of system was performed, pertinent positive and negative were detailed as above. Otherwise, the 10-point review of system was negative.      Past Medical History:   Diagnosis Date    Abnormal Pap smear of cervix     Cancer (HCC) 5/23/23    Schedule one year follow up colonoscopy    Colon cancer (HCC) 2023    carcinoma in situ    Depression     Osteoarthritis of left hip     Plantar fascial fibromatosis     Sinus congestion     chronic    Varicella        Past Surgical History:   Procedure Laterality Date    FOOT NEUROMA SURGERY Left     HEMICOLOECTOMY W/ ANASTOMOSIS N/A 06/23/2023    Procedure: LAPAROSCOPIC SIGMOID COLECTOMY WITH ROBOTICS, FLEXIBLE PROCTOSCOPY;  Surgeon: Ileana Locke MD;  Location: [facility];  Service: Surgical Oncology    US BREAST NEEDLE LOC RIGHT Right  10/08/2008       Family History   Problem Relation Name Age of Onset    Diabetes Mother Gaby     No Known Problems Father      Breast cancer Maternal Grandmother Leah Bejarano         50-60's    Stroke Maternal Grandmother Leah Bejarano     Diabetes Maternal Grandmother Leah Bejarano     Cancer Maternal Grandfather Matt     No Known Problems Paternal Grandmother      No Known Problems Brother      No Known Problems Half-Sister      Breast cancer Maternal Aunt Leah         68-70    Endometrial cancer Maternal Aunt          maybe 40's    No Known Problems Maternal Aunt      Colon cancer Neg Hx      Ovarian cancer Neg Hx         Social History     Socioeconomic History    Marital status: /Civil Union     Spouse name: Not on file    Number of children: Not on file    Years of education: Not on file    Highest education level: Not on file   Occupational History    Not on file   Tobacco Use    Smoking status: Former     Current packs/day: 0.00     Average packs/day: 1 pack/day for 19.0 years (19.0 ttl pk-yrs)     Types: Cigarettes     Start date:      Quit date:      Years since quittin.4     Passive exposure: Past    Smokeless tobacco: Never   Vaping Use    Vaping status: Never Used   Substance and Sexual Activity    Alcohol use: Yes     Comment: social    Drug use: Yes     Frequency: 1.0 times per week     Types: Marijuana    Sexual activity: Yes     Partners: Male     Birth control/protection: I.U.D.     Comment: no new partner in past year   Other Topics Concern    Not on file   Social History Narrative        No children    Retired  --communication infrastructure in [employer]     Social Drivers of Health     Financial Resource Strain: Low Risk  (2024)    Received from [facility]    Overall Financial Resource Strain (CARDIA)     Difficulty of Paying Living Expenses: Not hard at all   Food Insecurity: No Food Insecurity (2024)     Received from [facility]    Hunger Vital Sign     Within the past 12 months, you worried that your food would run out before you got the money to buy more.: Never true     Within the past 12 months, the food you bought just didn't last and you didn't have money to get more.: Never true   Transportation Needs: No Transportation Needs (9/20/2024)    Received from [facility]    PRAPARE - Transportation     Lack of Transportation (Medical): No     Lack of Transportation (Non-Medical): No   Physical Activity: Sufficiently Active (9/20/2024)    Received from [facility]    Exercise Vital Sign     On average, how many days per week do you engage in moderate to strenuous exercise (like a brisk walk)?: 5 days     On average, how many minutes do you engage in exercise at this level?: 50 min   Stress: Stress Concern Present (9/20/2024)    Received from [facility]    [employer] - Occupational Stress Questionnaire     Feeling of Stress : To some extent   Social Connections: Not on file   Intimate Partner Violence: Not on file   Housing Stability: Low Risk  (9/20/2024)    Received from [facility]    Housing Stability Vital Sign     In the last 12 months, was there a time when you were not able to pay the mortgage or rent on time?: No     In the past 12 months, how many times have you moved where you were living?: 0     At any time in the past 12 months, were you homeless or living in a shelter (including now)?: No       No Known Allergies    Current Outpatient Medications   Medication Sig Dispense Refill    Collagen-Vitamin C-Biotin (COLLAGEN PO) Take by mouth      Creatine POWD Use      Levonorgestrel (MIRENA) 20 MCG/DAY IUD 1 each by Intrauterine route once      TURMERIC CURCUMIN PO Take by mouth       No current facility-administered medications  for this visit.       (Not in a hospital admission)      Objective:     24 Hour Vitals Assessment:     Vitals:    06/19/25 0903   BP: 126/80   Pulse: 68   Resp: 16   Temp: 97.5 °F (36.4 °C)   SpO2: 97%           Weight at last visit: 157 lbs    Weight today: 152 lbs    PHYSICIAN EXAM:    General: Appearance: alert, cooperative, no distress.  HEENT: Normocephalic, atraumatic. No scleral icterus. conjunctivae clear. EOMI.  Chest: No tenderness to palpation. No open wound noted.  Lungs: Clear to auscultation bilaterally, Respirations unlabored.  Cardiac: Regular rate, +S1and S2  Abdomen: Soft, non-tender, non-distended. Bowel sounds are normal.   Extremities:  No edema, cyanosis, clubbing.  Skin: Skin color, turgor are normal. No rashes.  Lymphatics: no palpable supra-cervical, axillary, or inguinal adenopathy  Neurologic: Awake, Alert, and oriented, no gross focal deficits noted b/l.       DATA REVIEW:    Pathology Result:    Final Diagnosis   Date Value Ref Range Status   06/23/2023   Final    A. Sigmoid colon, sigmoidectomy:  -   Sigmoid colon with tattoo pigment and multinucleated giant cell reaction.  -   Negative for residual adenoma and carcinoma.   -   Resection margins are negative for adenoma and carcinoma.   -   Metastatic adenocarcinoma involving one of 19 lymph nodes (1/19, pN1a).    Interpretation performed at [address] [address]      05/23/2023   Final    A. Transverse colon polyp cold snare, polypectomy:  -   Fragments of sessile serrated adenoma (deeper levels examined).     -   Separate scant fragments of tubular adenoma, likely tissue contaminant.      B. Sigmoid colon polyp hot snare, polypectomy:  -   Invasive adenocarcinoma, moderately differentiated, arising in fragments of traditional serrated adenoma.  -   MMR (MLH1, MSH2, MSH6 and PMS2) studies by immunohistochemistry on B2 are pending.  -   See comment and synoptic report.    Comment:   Part B) CD31/CKC immunostain  on B3 is negative for lymphovascular invasion.    Select slide B2 is reviewed by Dr. Pilar Ramos who concurs with the diagnosis.    Diagnosis is communicated via [employer] to Dr. Reese Rose on 6/1/2023 at 0932.      Interpretation performed at [facility] [address] [place] [address], [facility] [address]           Image Results:   Image result are reviewed and documented in Hematology/Oncology history    CT chest abdomen pelvis w contrast  Narrative: CT CHEST, ABDOMEN AND PELVIS WITH IV CONTRAST    INDICATION:   Personal history of other malignant neoplasm of large intestine. .    COMPARISON: December 2024 and June 2024.    TECHNIQUE: CT examination of the chest, abdomen and pelvis was performed.  Dual energy CT scan technique (DECT) was employed. Multiplanar 2D reformatted images were created from the source data.    This examination, like all CT scans performed in the [facility], was performed utilizing techniques to minimize radiation dose exposure, including the use of iterative reconstruction and automated exposure control. Radiation dose length   product (DLP) for this visit: 641.3    IV Contrast: 50 cc of Omnipaque 350.  Enteric Contrast: Enteric contrast was administered.    FINDINGS:    CHEST    LUNGS: Lung windows demonstrate mild dependent atelectasis. There is biapical scarring, unchanged. No suspicious pulmonary nodules are identified. There is a stable 3 mm perifissural nodule along the left major fissure likely representing an   intrapulmonary lymph node.    PLEURA:  Unremarkable.    HEART/GREAT VESSELS: Heart size is within normal limits. Aorta is normal in caliber. Central pulmonary arteries are patent and normal in caliber.    MEDIASTINUM AND BRYAN:  Unremarkable.    CHEST WALL AND LOWER NECK: Visualized thyroid gland is unremarkable. No axillary adenopathy.    ABDOMEN    LIVER/BILIARY TREE:  One or more stable subcentimeter sharply circumscribed low-density hepatic lesion(s) are  noted, too small to accurately characterize, but statistically most likely to represent subcentimeter hepatic cysts.  No suspicious solid   hepatic lesion is identified.  Hepatic contours are normal.  No biliary dilatation.    GALLBLADDER:  No calcified gallstones. No pericholecystic inflammatory change.    SPLEEN:  Unremarkable.    PANCREAS:  Unremarkable.    ADRENAL GLANDS:  Unremarkable.    KIDNEYS/URETERS:  One or more simple renal cyst(s) is noted.  Otherwise unremarkable kidneys.  No hydronephrosis.    STOMACH AND BOWEL: Stomach is unremarkable. Small bowel loops are normal in caliber. There is suggestion of wall thickening involving the terminal ileum (series 302, images 1 ). Mild amount of stool is noted in the colon. Postsurgical changes are   noted at the rectosigmoid. There is mild wall thickening versus poor distention at the anastomosis but no discrete mass (series 302, images 204-302).    APPENDIX:  No findings to suggest appendicitis.    ABDOMINOPELVIC CAVITY:  No ascites.  No pneumoperitoneum.  No lymphadenopathy.    VESSELS: The aorta and IVC are normal in caliber. Incidental note is made of a circumaortic left renal vein.    PELVIS    REPRODUCTIVE ORGANS: Uterus demonstrates the presence of an IUD. No suspicious adnexal masses are identified.    URINARY BLADDER:  Unremarkable.    ABDOMINAL WALL/INGUINAL REGIONS:  Unremarkable.    OSSEOUS STRUCTURES:  No acute fracture or destructive osseous lesion. Mild degenerative changes are noted of the lower lumbar spine.  Impression: 1. Postsurgical changes at the rectosigmoid with patent anastomosis. There is possible mild wall thickening versus poor distention at the anastomosis. No discrete mass is identified. Attention on follow-up is recommended.    2. There is mild wall thickening involving the terminal ileum suggesting enteritis. This is new compared to the prior study. Correlate clinically.    3. Stable subcentimeter hypodensity in the liver. No  "new liver lesions are identified.    4. No significant adenopathy. No suspicious pulmonary nodule.    5. Please see above text for additional findings and details.    Electronically signed: 06/05/2025 05:59 PM Adamaris Ramirez MD      LABS:  Lab data are reviewed and documented in [facility] history.       Lab Results   Component Value Date    HGB 12.6 05/29/2025    HCT 39.8 05/29/2025    MCV 96 05/29/2025     05/29/2025    WBC 5.53 05/29/2025    NRBC 0 05/29/2025     Lab Results   Component Value Date    K 4.0 05/29/2025     05/29/2025    CO2 29 05/29/2025    BUN 27 (H) 05/29/2025    CREATININE 0.95 05/29/2025    GLUF 93 05/29/2025    CALCIUM 9.2 05/29/2025    AST 20 05/29/2025    ALT 14 05/29/2025    ALKPHOS 75 05/29/2025    EGFR 67 05/29/2025       No results found for: \"IRON\", \"TIBC\", \"FERRITIN\"    No results found for: \"MOUIRMPJ30\"    No results for input(s): \"WBC\", \"CREAT\", \"PLT\" in the last 72 hours.    By:  Hortencia Montague MD, 6/19/2025, 9:20 AM                                  "

## 2025-06-11 NOTE — ASSESSMENT & PLAN NOTE
This is a 54 y.o. c PMHx notable for sinus congestion, being seen in consultation for early stage colon adenocarcinoma on surveillance  Orders:    CEA; Future    Comprehensive metabolic panel; Future    CBC and differential; Future    CT chest abdomen pelvis w contrast; Future

## 2025-06-17 ENCOUNTER — OFFICE VISIT (OUTPATIENT)
Dept: PHYSICAL THERAPY | Facility: CLINIC | Age: 55
End: 2025-06-17
Attending: ORTHOPAEDIC SURGERY
Payer: COMMERCIAL

## 2025-06-17 DIAGNOSIS — M16.12 PRIMARY OSTEOARTHRITIS OF LEFT HIP: Primary | ICD-10-CM

## 2025-06-17 DIAGNOSIS — M25.552 LEFT HIP PAIN: Primary | ICD-10-CM

## 2025-06-17 PROCEDURE — 97110 THERAPEUTIC EXERCISES: CPT | Performed by: PHYSICAL THERAPIST

## 2025-06-17 PROCEDURE — 97140 MANUAL THERAPY 1/> REGIONS: CPT | Performed by: PHYSICAL THERAPIST

## 2025-06-17 NOTE — PROGRESS NOTES
PT Re-Evaluation     Today's date: 2025  Patient name: Irma Gee  : 1970  MRN: [id number]  Referring provider: Carl Pat MD  Dx:   Encounter Diagnosis     ICD-10-CM    1. Left hip pain  M25.552                      Assessment  Impairments: abnormal coordination, abnormal muscle firing, abnormal muscle tone, abnormal or restricted ROM, abnormal movement, activity intolerance, impaired balance, impaired physical strength, pain with function and poor posture   Symptom irritability: moderate    Assessment details: Problem List:  1) Limited L hip mobility   2) Limited lumbar extension    Irma Gee is a pleasant 54 y.o. female who presents with signs and symptoms consistent with L hip mobility deficits leading to pain and referral down anterior L hip.  Clinically demosntrates + FADIR testing and limited L hip IR and extension deficits suggesting capsular restriction.  This leads to limited ability to perform ambulation and running due to pain and discomfort. This suggesting no further referral appears necessary at this time based upon examination results.  I expect she will benefit from at least 6 weeks of PT to maximize function and return to PLOF pain-free.        Comparable signs:  1) Unable to squat  2) Unable to rotation R     Re-assessment 25:  Irma has attended 5 visits since the initiation of PT and reports minimal change despite improved L hip strength and mobility B/L.  Continues to have limited L hip extension mobility affecting gait mechanics leading to continued jani stretch and anterior hip flexor strain. This suggests the need for continued skilled PT to address the above listed impairments, achieve established goals and return to PLOF Pain-free.  Understanding of Dx/Px/POC: good     Prognosis: good    Goals  Short Term Goals (4 weeks)  1.) Establish independence with HEP- MET  2.) Decrease subjective pain levels from NPRS at least to 2-5/10 at rest and with activity-  partially met  3.) Improve L hip ROM at least 5-10 degrees into all planes to allow for improved ease of movement with less guarding- partially met    Long Term Goals (8 weeks)  1.) Improve L hip ROM to WNL in all planes to restore normal movement with ADLs and function- not met  2.) Improve L hip strength to 5/5 in all planes in order to return to pain-free ADLs and function- not MET  3.) Improve FOTO score at least to 75 points showing improved self reported disability - partially met       Plan  Patient would benefit from: PT eval and skilled physical therapy  Planned modality interventions: biofeedback, cryotherapy, electrical stimulation/Russian stimulation and TENS    Planned therapy interventions: abdominal trunk stabilization, activity modification, ADL retraining, ADL training, balance, balance/weight bearing training, behavior modification, body mechanics training, coordination, compression, flexibility, functional ROM exercises, gait training, graded activity, graded exercise, graded motor, home exercise program, IADL retraining, transfer training, therapeutic training, therapeutic exercise, therapeutic activities, stretching, strengthening, sensory integrative techniques, self care, postural training, patient/caregiver education, neuromuscular re-education, nerve gliding, muscle pump exercises, motor coordination training, massage, manual therapy, IASTM and joint mobilization  Speech planned therapy intervention: NMES    Frequency: 2x week  Duration in weeks: 8  Plan of Care beginning date: 6/17/2025  Plan of Care expiration date: 8/19/2025  Treatment plan discussed with: patient  Plan details: Initiate POC for stability; monitor sxs and progress as able         Subjective Evaluation    History of Present Illness  Mechanism of injury: Irma Gee is a 54 y.o. female who presents with increased L hip pain starting 1 year ago with SAMMI that is intermittent.  Notes that tensing at her L hip or turning.   Best in the AM; worse with lifting or carrying something heavy with.  Burning pain at anterior L hip worse with carrying.  Notes mid knee/thigh worse with overuse and lifting.  Preparing for a hike may have to step up with L foot and can get a shooting pain.   Decided to seek out MD consult where X-rays were (-) for fx and script for OPPT provided.  Denies night pain or changes in bowel or bladder function.  Reports increased burning pain more frequent at anterior at anterior L hip worse also with transitions NT signs or sxs.  F/U with MD in 3 months for a potential hip injection.  Wishes to return to PLOF pain-free run or do a tough mudder and hike.  No diffuclty with sitting, standing starts to bother; 3 miles it starts to hurt.      Re-assessment 25:  Irma has attended 5 visits since the initiation of PT and reports a minimal change in her L hip leading to pain still with squatting or going up stairs and walking leads to increased pain and discomfort.  Notes still having difficulty with running or jumping. Notes in the AM she feels okay but now unable to walk for short distances.  Notes she feels improved strength but has a F/U with MD in July.  Wishes to continue with PT.            Not a recurrent problem   Quality of life: good    Patient Goals  Patient goals for therapy: decreased edema, decreased pain, improved balance, increased motion, increased strength, independence with ADLs/IADLs and return to sport/leisure activities    Pain  Current pain ratin  At best pain ratin  At worst pain ratin  Location: L hip  Quality: sharp, dull ache and burning  Relieving factors: medications, change in position, relaxation and rest (ibuprofen prn)  Aggravating factors: lifting, running and walking  Progression: worsening    Social Support  Steps to enter house: yes  Stairs in house: yes   3  Lives in: multiple-level home and apartment  Lives with: spouse    Employment status: not working  Exercise  history: Legs with freeweights; Split routines 5 days away; HIIT workouts, jump training (better for pain)      Diagnostic Tests  X-ray: abnormal  Treatments  Previous treatment: physical therapy  Current treatment: physical therapy      Objective     Active Range of Motion     Lumbar   Flexion:  WFL  Extension:  Restriction level: maximal  Left lateral flexion:  WFL  Right lateral flexion:  with pain Restriction level: minimal  Left rotation:  WFL  Right rotation:  WFL and with pain  Left Hip   Flexion: 95 degrees   External rotation (90/90): 45 degrees   Internal rotation (90/90): 45 degrees     Right Hip   Flexion: 110 degrees   External rotation (90/90): 45 degrees   Internal rotation (90/90): 54 degrees     Strength/Myotome Testing     Lumbar   Left   Heel walk: normal  Toe walk: normal    Right   Heel walk: normal  Toe walk: normal    Left Hip   Planes of Motion   Flexion: 5  Extension: 4+  Abduction: 4-  Adduction: 5  External rotation: 4+  Internal rotation: 5    Right Hip   Planes of Motion   Flexion: 4+  Extension: 4+  Adduction: 5  External rotation: 5  Internal rotation: 5    Left Knee   Flexion: 5  Extension: 5    Right Knee   Flexion: 5  Extension: 5    Left Ankle/Foot   Dorsiflexion: 5  Plantar flexion: 5    Right Ankle/Foot   Dorsiflexion: 5  Plantar flexion: 5    Tests     Lumbar     Left   Negative passive SLR and slump test.     Right   Negative passive SLR and slump test.     Left Pelvic Girdle/Sacrum   Positive: thigh thrust and active SLR test.     Right Pelvic Girdle/Sacrum   Negative: thigh thrust and active SLR test.     Left Hip   Positive FADIR.     Right Hip   Positive FADIR.     Functional Assessment      Squat    Left within functional limits, right within functional limits and pain.     Single Leg Squat   Left Leg  Pain.     Single Leg Stance   Left: 30 seconds  Right: 30 seconds            Daily Treatment Diary     Precautions: none  CO-MORBIDITIES: Colon cancer history 1 year  TO MD  "On: 7//19/25  FOTO Completed On: 5/19/25    POC Expires Reeval for Medicare to be completed  Unit Limit Auth Expiration Date PT/OT/STVisit Limit   8/19/25 By visit NA NA 12/31/25 50    Completed on visit                    Auth Status DATE 5/19 5/23 5/30 6/10 6/17    Approved Visit # 1 2 3 4 5     Remaining 49 [phone] 46 45    MANUAL THERAPY         L hip PA mobilization  5 min   5 min  5 min     SIJ assess andanterior MET  3 min  5 min       Prone quad stretch and ER  10 min        Test and re-test  10 min  10 min      Anterior L innominant rotation Gr 3-4   3 min   5 min     Re-assess     15 min     THERAPEUTIC EXERCISE HEP         1/2 kneel stretch 10x  10x3\" with ER Roberto 10/3\" 10x3\" 10x3\"    Prone hip ext 10x     2x10    STD ext 10x  2x10 with OP GTB 30x      Quad rock with ER   10x3\"       Squat   10x 2x10      Curtsy squat   10xea  2x10      Wlaking lunge   3x20 ft 3x20 ft      Side stepping   5 laps 10 ft GTB 5 laps 10 ft GTB      Piriformis stretch seated   3x30\" 3x30\" ea       Lat step down    8\" 20x  8\" 2x10    Heel slide     BFR 75 reps      SLR     BFR 75 reps Roberto 3x30    STD march     BFR 75 reps 3x30\" seated 5#                        NEUROMUSCULAR REEDUCATION           PNE on pain-control     15 min  10 min                                                                                                                                       THERAPEUTIC ACTIVITY                                                  GAIT TRAINING                                                  MODALITIES                                            "

## 2025-06-19 ENCOUNTER — OFFICE VISIT (OUTPATIENT)
Dept: HEMATOLOGY ONCOLOGY | Facility: CLINIC | Age: 55
End: 2025-06-19
Payer: COMMERCIAL

## 2025-06-19 VITALS
OXYGEN SATURATION: 97 % | HEART RATE: 68 BPM | HEIGHT: 67 IN | SYSTOLIC BLOOD PRESSURE: 126 MMHG | BODY MASS INDEX: 23.86 KG/M2 | TEMPERATURE: 97.5 F | DIASTOLIC BLOOD PRESSURE: 80 MMHG | WEIGHT: 152 LBS | RESPIRATION RATE: 16 BRPM

## 2025-06-19 DIAGNOSIS — C18.8 OVERLAPPING MALIGNANT NEOPLASM OF COLON (HCC): Primary | Chronic | ICD-10-CM

## 2025-06-19 PROCEDURE — 99214 OFFICE O/P EST MOD 30 MIN: CPT | Performed by: INTERNAL MEDICINE

## 2025-06-19 RX ORDER — CREATINE 100 %
POWDER (GRAM) MISCELLANEOUS
COMMUNITY

## 2025-06-23 ENCOUNTER — TELEPHONE (OUTPATIENT)
Age: 55
End: 2025-06-23

## 2025-06-23 NOTE — TELEPHONE ENCOUNTER
Caller: pt    Doctor: Dr. morataya    Reason for call: pt would like to get a hip injection.    Call back#: [phone]

## 2025-06-24 ENCOUNTER — OFFICE VISIT (OUTPATIENT)
Dept: PHYSICAL THERAPY | Facility: CLINIC | Age: 55
End: 2025-06-24
Attending: ORTHOPAEDIC SURGERY
Payer: COMMERCIAL

## 2025-06-24 DIAGNOSIS — M25.552 LEFT HIP PAIN: Primary | ICD-10-CM

## 2025-06-24 PROCEDURE — 97110 THERAPEUTIC EXERCISES: CPT | Performed by: PHYSICAL THERAPIST

## 2025-06-24 PROCEDURE — 97112 NEUROMUSCULAR REEDUCATION: CPT | Performed by: PHYSICAL THERAPIST

## 2025-06-24 PROCEDURE — 97140 MANUAL THERAPY 1/> REGIONS: CPT | Performed by: PHYSICAL THERAPIST

## 2025-06-24 NOTE — TELEPHONE ENCOUNTER
Caller: nidia Solis     Doctor: Dr. Dodson     Reason for call: pt calling to be schedule for a hip injection.     Call back#: [phone]

## 2025-06-24 NOTE — PROGRESS NOTES
"Daily Note     Today's date: 2025  Patient name: Irma Gee  : 1970  MRN: [id number]  Referring provider: Carl Pat MD  Dx:   Encounter Diagnosis     ICD-10-CM    1. Left hip pain  M25.552                      Subjective: Reports having continued anterior hip discomfort and pain.  Frustrated with progress despite 4 days of relief with accupuncture.      Objective: See treatment diary below      Assessment: Continues to have limited L hip extension with ambulation.  With repeated hip extension mobilization had improved L hip ABD.  With cues for ambulation pattern and hip extension was able to perform improved ambulation hip excursion.  Will continue with 1/2 knee and extension mobility as able.       Plan: Continue per plan of care.      Daily Treatment Diary     Precautions: none  CO-MORBIDITIES: Colon cancer history 1 year  TO MD On: 25  FOTO Completed On: 25    POC Expires Reeval for Medicare to be completed  Unit Limit Auth Expiration Date PT/OT/STVisit Limit   25 By visit NA NA 25 50    Completed on visit                    Auth Status DATE 5/19 5/23 5/30 6/10 6/17 6/24   Approved Visit # 1 2 3 4 5 6    Remaining 49 [phone] 46 45 44   MANUAL THERAPY         L hip PA mobilization  5 min   5 min  5 min  5 min    SIJ assess andanterior MET  3 min  5 min       Prone quad stretch and ER  10 min     5 min    Test and re-test  10 min  10 min      Anterior L innominant rotation Gr 3-4   3 min   5 min  5 min    Re-assess     15 min     THERAPEUTIC EXERCISE HEP         1/2 kneel stretch 10x  10x3\" with ER Roberto 10/3\" 10x3\" 10x3\" 10x3\"   Prone hip ext 10x     2x10 2x10   STD ext 10x  2x10 with OP GTB 30x      Quad rock with ER   10x3\"       Squat   10x 2x10      Curtsy squat   10xea  2x10      Wlaking lunge   3x20 ft 3x20 ft      Side stepping   5 laps 10 ft GTB 5 laps 10 ft GTB      Piriformis stretch seated   3x30\" 3x30\" ea       Lat step down    8\" 20x  8\" 2x10    Heel slide     " "BFR 75 reps   20x   SLR     BFR 75 reps Roberto 3x30 2x10   STD march     BFR 75 reps 3x30\" seated 5#                        NEUROMUSCULAR REEDUCATION           PNE on pain-control     15 min  10 min  10 min                                                                                                                                      THERAPEUTIC ACTIVITY          TM lunge walk with cuess for PPT       5 min                                  GAIT TRAINING                                                  MODALITIES                                                 "

## 2025-06-24 NOTE — TELEPHONE ENCOUNTER
PRE OP INSTRUCTIONS:           Hold medication  x _ full days prior, last dose on _             Patient advised to hold medication from Date till their appointment at that time instructions to restart will be given.    Patient stated verbal understanding. Aware that nursing WILL call to review hold dates as well    -If you are on prescription blood thinners, you may have to hold the medication for several days before the procedure.    Please call the office to discuss medication holds at [phone].  -Do not eat or drink ONE HOUR prior to your procedure. If you are diabetic, may follow regular breakfast/lunch schedule and take usual    diabetic medications.  -Lumbar( low back) procedure, please wear comfortable slacks/pants.  -Cervical (neck) procedure, please wear a shirt/blouse that is easy to remove. PATIENT INSTRUCTED TO STOP ALL NSAID'S 4 DAYS             PRIOR TO PROCEDURE EXCEPT FOR IBUPROFEN IT CAN BE TAKEN UP TO 24 HOURS PRIOR TO PROCEDURE.   -A  is required to take you home form your procedure.  -Continue all to take prescribed medication the day of your procedure, including blood pressure medications.  -If you are prescribe antibiotics, have an active infection or have an open wound, please contact the office at [phone].  -Please refrain from any vaccinations two days before and two days after injection.  -Insurance authorization received in not a guarantee of payment per your insurance company's authorization disclaimer and it is    your responsibility to verify your benefits.          -If you have any questions about the instructions, please call me at [phone]     Explained TIFFANY as an injection of steroids into an affected area to reduce swelling and provide relief. This procedure does not provide instant relief.   Allow 3-5 days for the steroid to begin to work. During this time, there may be changes in your pain, it may even get worse before it gets better.    This office will fu in 7  days to determine the effectiveness of the procedure. Be advised, the procedure may need to be repeated to provide adequate relief.   There is also a chance that the procedures will fail to relieve pain. There are few restrictions after the procedure. The day of the procedure, please go home and rest. Do not drive.   Ok to resume normal activities 24 - 48 hours after the procedure as tolerated. Do not submerge the site for 2 days after the procedure.    Be aware, steroids will raise blood sugar levels for ~ 2 weeks after the procedure.

## 2025-07-01 ENCOUNTER — OFFICE VISIT (OUTPATIENT)
Dept: PHYSICAL THERAPY | Facility: CLINIC | Age: 55
End: 2025-07-01
Attending: ORTHOPAEDIC SURGERY
Payer: COMMERCIAL

## 2025-07-01 DIAGNOSIS — M25.552 LEFT HIP PAIN: Primary | ICD-10-CM

## 2025-07-01 PROCEDURE — 97110 THERAPEUTIC EXERCISES: CPT | Performed by: PHYSICAL THERAPIST

## 2025-07-01 PROCEDURE — 97140 MANUAL THERAPY 1/> REGIONS: CPT | Performed by: PHYSICAL THERAPIST

## 2025-07-01 NOTE — PROGRESS NOTES
"Daily Note     Today's date: 2025  Patient name: Irma Gee  : 1970  MRN: [id number]  Referring provider: Carl Pat MD  Dx:   Encounter Diagnosis     ICD-10-CM    1. Left hip pain  M25.552                      Subjective: Continues to have compliance with HEP and 1/2 kneel stretch.  Less pain with performance of stretching, still having burning pain with walking.       Objective: See treatment diary below      Assessment: Less TTP to anterior L illiopsoas, still with TTP to rectus fem origin.   Fair response to performance of TrA use with hip strengthening and stability.  Will assess TrA performance next session with BP cuff.       Plan: Continue per plan of care.      Daily Treatment Diary     Precautions: none  CO-MORBIDITIES: Colon cancer history 1 year  TO MD On: 25  FOTO Completed On: 25    POC Expires Reeval for Medicare to be completed  Unit Limit Auth Expiration Date PT/OT/STVisit Limit   25 By visit NA NA 25 50    Completed on visit                    Auth Status DATE 7/1  5/30 6/10 6/17 6/24   Approved Visit # 7 8 3 4 5 6    Remaining 43 42 47 46 45 44   MANUAL THERAPY         L hip PA mobilization 10 min  5 min   5 min  5 min  5 min    SIJ assess andanterior MET  3 min  5 min       Prone quad stretch and ER 15 min  10 min     5 min    Test and re-test  10 min  10 min      Anterior L innominant rotation Gr 3-4   3 min   5 min  5 min    Re-assess     15 min     THERAPEUTIC EXERCISE HEP         1/2 kneel stretch 10x 10x5\" 10x3\" with ER Roberto 10/3\" 10x3\" 10x3\" 10x3\"   Prone hip ext 10x 2x10    2x10 2x10   STD ext 10x 2x10 2x10 with OP GTB 30x      Quad rock with ER   10x3\"       Squat   10x 2x10      Curtsy squat  Off step 8\" 2x10 10xea  2x10      Wlaking lunge   3x20 ft 3x20 ft      Side stepping   5 laps 10 ft GTB 5 laps 10 ft GTB      Piriformis stretch seated   3x30\" 3x30\" ea       Lat step down  2x10 8\"  8\" 20x  8\" 2x10    Heel slide     BFR 75 reps   20x   SLR  " "2x10   BFR 75 reps Roberto 3x30 2x10   STD march Thomas with manual release 2x10   BFR 75 reps 3x30\" seated 5#                        NEUROMUSCULAR REEDUCATION           PNE on pain-control     15 min  10 min  10 min                                                                                                                                      THERAPEUTIC ACTIVITY          TM lunge walk with cuess for PPT       5 min                                  GAIT TRAINING                                                  MODALITIES                                                   "

## 2025-07-01 NOTE — TELEPHONE ENCOUNTER
Caller: Irma    Doctor/Office: Dr Patel    Call regarding :  reschedule procedure      Call was transferred to:

## 2025-07-15 ENCOUNTER — OFFICE VISIT (OUTPATIENT)
Dept: OBGYN CLINIC | Facility: CLINIC | Age: 55
End: 2025-07-15
Payer: COMMERCIAL

## 2025-07-15 ENCOUNTER — OFFICE VISIT (OUTPATIENT)
Dept: PHYSICAL THERAPY | Facility: CLINIC | Age: 55
End: 2025-07-15
Attending: ORTHOPAEDIC SURGERY
Payer: COMMERCIAL

## 2025-07-15 VITALS — BODY MASS INDEX: 23.86 KG/M2 | HEIGHT: 67 IN | WEIGHT: 152 LBS

## 2025-07-15 DIAGNOSIS — M16.12 PRIMARY OSTEOARTHRITIS OF LEFT HIP: Primary | ICD-10-CM

## 2025-07-15 DIAGNOSIS — M25.552 LEFT HIP PAIN: Primary | ICD-10-CM

## 2025-07-15 PROCEDURE — 99213 OFFICE O/P EST LOW 20 MIN: CPT | Performed by: ORTHOPAEDIC SURGERY

## 2025-07-15 PROCEDURE — 97140 MANUAL THERAPY 1/> REGIONS: CPT | Performed by: PHYSICAL THERAPIST

## 2025-07-15 PROCEDURE — 97110 THERAPEUTIC EXERCISES: CPT | Performed by: PHYSICAL THERAPIST

## 2025-07-22 ENCOUNTER — EVALUATION (OUTPATIENT)
Dept: PHYSICAL THERAPY | Facility: CLINIC | Age: 55
End: 2025-07-22
Attending: ORTHOPAEDIC SURGERY
Payer: COMMERCIAL

## 2025-07-22 DIAGNOSIS — M25.552 LEFT HIP PAIN: Primary | ICD-10-CM

## 2025-07-22 PROCEDURE — 97140 MANUAL THERAPY 1/> REGIONS: CPT | Performed by: PHYSICAL THERAPIST

## 2025-07-22 PROCEDURE — 97110 THERAPEUTIC EXERCISES: CPT | Performed by: PHYSICAL THERAPIST

## 2025-07-22 NOTE — PROGRESS NOTES
PT Re-Evaluation     Today's date: 2025  Patient name: Irma Gee  : 1970  MRN: [id number]  Referring provider: Carl Pat MD  Dx:   Encounter Diagnosis     ICD-10-CM    1. Left hip pain  M25.552                      Assessment  Impairments: abnormal coordination, abnormal muscle firing, abnormal muscle tone, abnormal or restricted ROM, abnormal movement, activity intolerance, impaired balance, impaired physical strength, pain with function and poor posture   Symptom irritability: moderate    Assessment details: Problem List:  1) Limited L hip mobility   2) Limited lumbar extension    Irma Gee is a pleasant 54 y.o. female who presents with signs and symptoms consistent with L hip mobility deficits leading to pain and referral down anterior L hip.  Clinically demosntrates + FADIR testing and limited L hip IR and extension deficits suggesting capsular restriction.  This leads to limited ability to perform ambulation and running due to pain and discomfort. This suggesting no further referral appears necessary at this time based upon examination results.  I expect she will benefit from at least 6 weeks of PT to maximize function and return to PLOF pain-free.        Comparable signs:  1) Unable to squat  2) Unable to rotation R     Re-assessment 25:  Irma has attended 5 visits since the initiation of PT and reports minimal change despite improved L hip strength and mobility B/L.  Continues to have limited L hip extension mobility affecting gait mechanics leading to continued jani stretch and anterior hip flexor strain. This suggests the need for continued skilled PT to address the above listed impairments, achieve established goals and return to PLOF Pain-free.    Re-assessment 25:  Irma has attended 9 visits since the initiation of PT and reports a 60% GROC.  Clinically demonstrates gross improvements in L hip ROM and strength in all planes and able to perform squatting and  lunging with improved ability.  She still is unable to perform high level exercise secondary to continued FADIR and hip ABD weakness suggesting the need for continued skilled PT.      Understanding of Dx/Px/POC: good     Prognosis: good    Goals  Short Term Goals (4 weeks)  1.) Establish independence with HEP- MET  2.) Decrease subjective pain levels from NPRS at least to 2-5/10 at rest and with activity- partially met  3.) Improve L hip ROM at least 5-10 degrees into all planes to allow for improved ease of movement with less guarding- partially met    Long Term Goals (8 weeks)  1.) Improve L hip ROM to WNL in all planes to restore normal movement with ADLs and function- not met  2.) Improve L hip strength to 5/5 in all planes in order to return to pain-free ADLs and function- not MET  3.) Improve FOTO score at least to 75 points showing improved self reported disability - partially met       Plan  Patient would benefit from: PT eval and skilled physical therapy  Planned modality interventions: biofeedback, cryotherapy, electrical stimulation/Russian stimulation and TENS    Planned therapy interventions: abdominal trunk stabilization, activity modification, ADL retraining, ADL training, balance, balance/weight bearing training, behavior modification, body mechanics training, coordination, compression, flexibility, functional ROM exercises, gait training, graded activity, graded exercise, graded motor, home exercise program, IADL retraining, transfer training, therapeutic training, therapeutic exercise, therapeutic activities, stretching, strengthening, sensory integrative techniques, self care, postural training, patient/caregiver education, neuromuscular re-education, nerve gliding, muscle pump exercises, motor coordination training, massage, manual therapy, IASTM and joint mobilization  Speech planned therapy intervention: NMES    Frequency: 2x week  Duration in weeks: 8  Plan of Care beginning date:  7/22/2025  Plan of Care expiration date: 9/23/2025  Treatment plan discussed with: patient  Plan details: Initiate POC for stability; monitor sxs and progress as able         Subjective Evaluation    History of Present Illness  Mechanism of injury: Irma Gee is a 54 y.o. female who presents with increased L hip pain starting 1 year ago with SAMMI that is intermittent.  Notes that tensing at her L hip or turning.  Best in the AM; worse with lifting or carrying something heavy with.  Burning pain at anterior L hip worse with carrying.  Notes mid knee/thigh worse with overuse and lifting.  Preparing for a hike may have to step up with L foot and can get a shooting pain.   Decided to seek out MD consult where X-rays were (-) for fx and script for OPPT provided.  Denies night pain or changes in bowel or bladder function.  Reports increased burning pain more frequent at anterior at anterior L hip worse also with transitions NT signs or sxs.  F/U with MD in 3 months for a potential hip injection.  Wishes to return to PLOF pain-free run or do a tough mudder and hike.  No diffuclty with sitting, standing starts to bother; 3 miles it starts to hurt.      Re-assessment 6/17/25:  Irma has attended 5 visits since the initiation of PT and reports a minimal change in her L hip leading to pain still with squatting or going up stairs and walking leads to increased pain and discomfort.  Notes still having difficulty with running or jumping. Notes in the AM she feels okay but now unable to walk for short distances.  Notes she feels improved strength but has a F/U with MD in July.  Wishes to continue with PT.      Re-assessment 7/22/25:  Irma has attended 9 visits since the initiation of PT and reports a 60% GROC.  Notes improved ability to squat and able to go up and down stairs.  Able to walk up to 2 miles.  Wishes to be able to run around with her kids.  Still most worried about hiking and stepping down.  F/U with MD in 1  week.  Wishes to continue to work on L hip strength and mobility as able.           Not a recurrent problem   Quality of life: good    Patient Goals  Patient goals for therapy: decreased edema, decreased pain, improved balance, increased motion, increased strength, independence with ADLs/IADLs and return to sport/leisure activities    Pain  Current pain ratin  At best pain ratin  At worst pain ratin  Location: L hip  Quality: sharp, dull ache and burning  Relieving factors: medications, change in position, relaxation and rest (ibuprofen prn)  Aggravating factors: lifting, running and walking  Progression: worsening    Social Support  Steps to enter house: yes  Stairs in house: yes   3  Lives in: multiple-level home and apartment  Lives with: spouse    Employment status: not working  Exercise history: Legs with freeweights; Split routines 5 days away; HIIT workouts, jump training (better for pain)      Diagnostic Tests  X-ray: abnormal  Treatments  Previous treatment: physical therapy  Current treatment: physical therapy        Objective     Active Range of Motion     Lumbar   Flexion:  WFL  Extension:  Restriction level: maximal  Left lateral flexion:  WFL  Right lateral flexion:  with pain Restriction level: minimal  Left rotation:  WFL  Right rotation:  WFL and with pain  Left Hip   Flexion: 95 degrees   External rotation (90/90): 57 degrees   Internal rotation (90/90): 63 degrees     Right Hip   Flexion: 110 degrees   External rotation (90/90): 45 degrees   Internal rotation (90/90): 54 degrees     Strength/Myotome Testing     Lumbar   Left   Heel walk: normal  Toe walk: normal    Right   Heel walk: normal  Toe walk: normal    Left Hip   Planes of Motion   Flexion: 5  Extension: 5  Abduction: 4  Adduction: 5  External rotation: 5  Internal rotation: 5    Right Hip   Planes of Motion   Flexion: 4+  Extension: 4+  Abduction: 5  Adduction: 5  External rotation: 5  Internal rotation: 5    Left Knee  "  Flexion: 5  Extension: 5    Right Knee   Flexion: 5  Extension: 5    Left Ankle/Foot   Dorsiflexion: 5  Plantar flexion: 5    Right Ankle/Foot   Dorsiflexion: 5  Plantar flexion: 5    Tests     Lumbar     Left   Negative passive SLR and slump test.     Right   Negative passive SLR and slump test.     Left Pelvic Girdle/Sacrum   Positive: thigh thrust and active SLR test.     Right Pelvic Girdle/Sacrum   Negative: thigh thrust and active SLR test.     Left Hip   Positive FADIR.     Right Hip   Positive FADIR.     Functional Assessment      Squat    Left within functional limits and right within functional limits.     Single Leg Squat   Left Leg  No pain.     Single Leg Stance   Left: 30 seconds  Right: 30 seconds            Daily Treatment Diary     Precautions: none  CO-MORBIDITIES: Colon cancer history 1 year  TO MD On: 7//19/25  FOTO Completed On: 5/19/25    POC Expires Reeval for Medicare to be completed  Unit Limit Auth Expiration Date PT/OT/STVisit Limit   9/23/25 By visit NA NA 12/31/25 50    Completed on visit                    Auth Status DATE 7/1 7/15 7/22 6/10 6/17 6/24   Approved Visit # [phone] 6    Remaining [phone]   MANUAL THERAPY         L hip PA mobilization 10 min    5 min  5 min  5 min    SIJ assess andanterior MET  5 min  5 min       Prone quad stretch and ER 15 min      5 min    Test and re-test    10 min      UPA to L4-L5 Gr 3-4  5 min  10 min       Anterior L innominant rotation Gr 3-4  3 min HVLAT Gr 5 anterior 10 min HVLAT to T/S  5 min  5 min    Re-assess   15 min   15 min     THERAPEUTIC EXERCISE HEP         1/2 kneel stretch 10x 10x5\" 10x3\" with ER  10x3\" 10x3\" 10x3\"   Prone hip ext 10x 2x10    2x10 2x10   STD ext 10x 2x10        Quad rock with ER          Squat    2x10      Curtsy squat  Off step 8\" 2x10 2x10 ea        Wlaking lunge   6x20 ft       Side stepping   5 laps 10 ft GTB       Piriformis stretch seated           Lat step down  2x10 8\"  8\" 20x  8\" 2x10    Heel " "slide     BFR 75 reps   20x   SLR  2x10   BFR 75 reps Roberto 3x30 2x10   STD march Thomas with manual release 2x10   BFR 75 reps 3x30\" seated 5#    SL RDL KB   10# 2x10 ea        Sled push   100# 8 laps 20 ft       NEUROMUSCULAR REEDUCATION           PNE on pain-control     15 min  10 min  10 min                                                                                                                                      THERAPEUTIC ACTIVITY          TM lunge walk with cuess for PPT       5 min    Elliptical   10 min                           GAIT TRAINING                                                  MODALITIES                                                      "

## 2025-07-29 ENCOUNTER — OFFICE VISIT (OUTPATIENT)
Dept: PHYSICAL THERAPY | Facility: CLINIC | Age: 55
End: 2025-07-29
Attending: ORTHOPAEDIC SURGERY
Payer: COMMERCIAL

## 2025-07-29 DIAGNOSIS — M25.552 LEFT HIP PAIN: Primary | ICD-10-CM

## 2025-07-29 PROCEDURE — 97110 THERAPEUTIC EXERCISES: CPT | Performed by: PHYSICAL THERAPIST

## 2025-07-31 ENCOUNTER — HOSPITAL ENCOUNTER (OUTPATIENT)
Dept: RADIOLOGY | Facility: CLINIC | Age: 55
Discharge: HOME | End: 2025-07-31
Attending: ANESTHESIOLOGY
Payer: COMMERCIAL

## 2025-07-31 VITALS
HEART RATE: 63 BPM | DIASTOLIC BLOOD PRESSURE: 92 MMHG | SYSTOLIC BLOOD PRESSURE: 158 MMHG | OXYGEN SATURATION: 98 % | RESPIRATION RATE: 16 BRPM | TEMPERATURE: 97.5 F

## 2025-07-31 DIAGNOSIS — M16.12 PRIMARY OSTEOARTHRITIS OF LEFT HIP: ICD-10-CM

## 2025-07-31 PROCEDURE — 20610 DRAIN/INJ JOINT/BURSA W/O US: CPT | Performed by: ANESTHESIOLOGY

## 2025-07-31 PROCEDURE — 77002 NEEDLE LOCALIZATION BY XRAY: CPT | Performed by: ANESTHESIOLOGY

## 2025-07-31 RX ORDER — ROPIVACAINE HYDROCHLORIDE 2 MG/ML
2 INJECTION, SOLUTION EPIDURAL; INFILTRATION; PERINEURAL ONCE
Status: COMPLETED | OUTPATIENT
Start: 2025-07-31 | End: 2025-07-31

## 2025-07-31 RX ORDER — METHYLPREDNISOLONE ACETATE 80 MG/ML
40 INJECTION, SUSPENSION INTRA-ARTICULAR; INTRALESIONAL; INTRAMUSCULAR; PARENTERAL; SOFT TISSUE ONCE
Status: COMPLETED | OUTPATIENT
Start: 2025-07-31 | End: 2025-07-31

## 2025-07-31 RX ADMIN — ROPIVACAINE HYDROCHLORIDE 2 ML: 2 INJECTION EPIDURAL; INFILTRATION; PERINEURAL at 08:11

## 2025-07-31 RX ADMIN — IOHEXOL 1 ML: 300 INJECTION, SOLUTION INTRAVENOUS at 08:11

## 2025-07-31 RX ADMIN — METHYLPREDNISOLONE ACETATE 40 MG: 80 INJECTION, SUSPENSION INTRA-ARTICULAR; INTRALESIONAL; INTRAMUSCULAR; SOFT TISSUE at 08:11

## 2025-08-12 ENCOUNTER — OFFICE VISIT (OUTPATIENT)
Dept: PHYSICAL THERAPY | Facility: CLINIC | Age: 55
End: 2025-08-12
Attending: ORTHOPAEDIC SURGERY
Payer: COMMERCIAL

## 2025-08-14 ENCOUNTER — TELEPHONE (OUTPATIENT)
Dept: PAIN MEDICINE | Facility: CLINIC | Age: 55
End: 2025-08-14

## (undated) DEVICE — TROCAR PORT ACCESS 5 X120MML W/BLDLS OPTICAL TIP AIRSEAL

## (undated) DEVICE — NEEDLE 25G X 1 1/2

## (undated) DEVICE — SURGICEL 4 X 8

## (undated) DEVICE — TRAY FOLEY 16FR URIMETER SURESTEP

## (undated) DEVICE — VESSEL SEALER EXTEND: Brand: ENDOWRIST

## (undated) DEVICE — SUT PROLENE 2-0 SH 30 IN 8833H

## (undated) DEVICE — SUT VICRYL PLUS 0 UR-6 27IN VCP603H

## (undated) DEVICE — ELECTRO LUBE IS A SINGLE PATIENT USE DEVICE THAT IS INTENDED TO BE USED ON ELECTROSURGICAL ELECTRODES TO REDUCE STICKING.: Brand: KEY SURGICAL ELECTRO LUBE

## (undated) DEVICE — SUT PDS II 0 CTB-1 27 IN ZB340

## (undated) DEVICE — WOUND RETRACTOR AND PROTECTOR: Brand: ALEXIS WOUND PROTECTOR-RETRACTOR

## (undated) DEVICE — SYRINGE 10ML LL

## (undated) DEVICE — Device: Brand: DEFENDO AIR/WATER/SUCTION AND BIOPSY VALVE

## (undated) DEVICE — INTENDED FOR TISSUE SEPARATION, AND OTHER PROCEDURES THAT REQUIRE A SHARP SURGICAL BLADE TO PUNCTURE OR CUT.: Brand: BARD-PARKER SAFETY BLADES SIZE 15, STERILE

## (undated) DEVICE — BLADELESS OBTURATOR: Brand: WECK VISTA

## (undated) DEVICE — STAPLER 60: Brand: SUREFORM

## (undated) DEVICE — REDUCER: Brand: ENDOWRIST

## (undated) DEVICE — SUT MONOCRYL 4-0 PS-2 18 IN Y496G

## (undated) DEVICE — COLUMN DRAPE

## (undated) DEVICE — CANNULA SEAL

## (undated) DEVICE — 40595 XL TRENDELENBURG POSITIONING KIT: Brand: 40595 XL TRENDELENBURG POSITIONING KIT

## (undated) DEVICE — PREMIUM DRY TRAY LF: Brand: MEDLINE INDUSTRIES, INC.

## (undated) DEVICE — ELECTRODE BLADE MOD E-Z CLEAN 2.5IN 6.4CM -0012M

## (undated) DEVICE — TIP-UP FENESTRATED GRASPER: Brand: ENDOWRIST

## (undated) DEVICE — SUT VICRYL 2-0 REEL 54 IN J286G

## (undated) DEVICE — 3M™ STERI-STRIP™ REINFORCED ADHESIVE SKIN CLOSURES, R1547, 1/2 IN X 4 IN (12 MM X 100 MM), 6 STRIPS/ENVELOPE: Brand: 3M™ STERI-STRIP™

## (undated) DEVICE — CO2 AND WATER TUBING/CAP SET FOR OLYMPUS® SCOPES & UCR: Brand: ERBE

## (undated) DEVICE — MAYO STAND COVER: Brand: CONVERTORS

## (undated) DEVICE — ARM DRAPE

## (undated) DEVICE — MEGA SUTURECUT ND: Brand: ENDOWRIST

## (undated) DEVICE — GLOVE SRG BIOGEL 6.5

## (undated) DEVICE — ADHESIVE SKIN HIGH VISCOSITY EXOFIN 1ML

## (undated) DEVICE — IRRIG ENDO FLO TUBING

## (undated) DEVICE — MEDI-VAC YANK SUCT HNDL W/TPRD BULBOUS TIP: Brand: CARDINAL HEALTH

## (undated) DEVICE — TUBING SUCTION 5MM X 12 FT

## (undated) DEVICE — VISUALIZATION SYSTEM: Brand: CLEARIFY

## (undated) DEVICE — LARGE NEEDLE DRIVER: Brand: ENDOWRIST

## (undated) DEVICE — FENESTRATED BIPOLAR FORCEPS: Brand: ENDOWRIST

## (undated) DEVICE — SEAL

## (undated) DEVICE — INTENDED FOR TISSUE SEPARATION, AND OTHER PROCEDURES THAT REQUIRE A SHARP SURGICAL BLADE TO PUNCTURE OR CUT.: Brand: BARD-PARKER SAFETY BLADES SIZE 11, STERILE

## (undated) DEVICE — FIRST STEP BEDSIDE KIT - STAND-UP POUCH, ENDOSCOPIC CLEANING PAD - 1 POUCH: Brand: FIRST STEP BEDSIDE KIT - STAND-UP POUCH, ENDOSCOPIC CLEANING PAD

## (undated) DEVICE — CHLORAPREP HI-LITE 26ML ORANGE

## (undated) DEVICE — TIP COVER ACCESSORY

## (undated) DEVICE — AIRSEAL TUBE SMOKE EVAC LUMENX3 FILTERED

## (undated) DEVICE — KIT, BETHLEHEM ROBOTIC PROST: Brand: CARDINAL HEALTH

## (undated) DEVICE — STAPLER 60 RELOAD BLUE: Brand: SUREFORM

## (undated) DEVICE — WOUND RETRACTOR AND PROTECTOR: Brand: ALEXIS O WOUND PROTECTOR-RETRACTOR